# Patient Record
Sex: MALE | Race: WHITE | Employment: PART TIME | URBAN - METROPOLITAN AREA
[De-identification: names, ages, dates, MRNs, and addresses within clinical notes are randomized per-mention and may not be internally consistent; named-entity substitution may affect disease eponyms.]

---

## 2017-01-18 ENCOUNTER — APPOINTMENT (OUTPATIENT)
Dept: INTERNAL MEDICINE CLINIC | Age: 67
End: 2017-01-18

## 2017-01-18 ENCOUNTER — TELEPHONE (OUTPATIENT)
Dept: INTERNAL MEDICINE CLINIC | Age: 67
End: 2017-01-18

## 2017-01-18 DIAGNOSIS — E11.9 TYPE 2 DIABETES MELLITUS WITHOUT COMPLICATION, UNSPECIFIED LONG TERM INSULIN USE STATUS: Primary | ICD-10-CM

## 2017-01-18 DIAGNOSIS — I10 ESSENTIAL HYPERTENSION: ICD-10-CM

## 2017-01-18 DIAGNOSIS — E78.1 HYPERTRIGLYCERIDEMIA: ICD-10-CM

## 2017-01-19 LAB
ALBUMIN SERPL-MCNC: 4.5 G/DL (ref 3.6–4.8)
ALBUMIN/CREAT UR: <2.7 MG/G CREAT (ref 0–30)
ALBUMIN/GLOB SERPL: 2.1 {RATIO} (ref 1.1–2.5)
ALP SERPL-CCNC: 52 IU/L (ref 39–117)
ALT SERPL-CCNC: 30 IU/L (ref 0–44)
AST SERPL-CCNC: 27 IU/L (ref 0–40)
BILIRUB SERPL-MCNC: 0.4 MG/DL (ref 0–1.2)
BUN SERPL-MCNC: 16 MG/DL (ref 8–27)
BUN/CREAT SERPL: 17 (ref 10–22)
CALCIUM SERPL-MCNC: 9.4 MG/DL (ref 8.6–10.2)
CHLORIDE SERPL-SCNC: 103 MMOL/L (ref 96–106)
CHOLEST SERPL-MCNC: 131 MG/DL (ref 100–199)
CO2 SERPL-SCNC: 22 MMOL/L (ref 18–29)
CREAT SERPL-MCNC: 0.94 MG/DL (ref 0.76–1.27)
CREAT UR-MCNC: 113.2 MG/DL
ERYTHROCYTE [DISTWIDTH] IN BLOOD BY AUTOMATED COUNT: 13.6 % (ref 12.3–15.4)
EST. AVERAGE GLUCOSE BLD GHB EST-MCNC: 134 MG/DL
GLOBULIN SER CALC-MCNC: 2.1 G/DL (ref 1.5–4.5)
GLUCOSE SERPL-MCNC: 116 MG/DL (ref 65–99)
HBA1C MFR BLD: 6.3 % (ref 4.8–5.6)
HCT VFR BLD AUTO: 44.4 % (ref 37.5–51)
HDLC SERPL-MCNC: 41 MG/DL
HGB BLD-MCNC: 15.3 G/DL (ref 12.6–17.7)
LDLC SERPL CALC-MCNC: 67 MG/DL (ref 0–99)
MCH RBC QN AUTO: 31.8 PG (ref 26.6–33)
MCHC RBC AUTO-ENTMCNC: 34.5 G/DL (ref 31.5–35.7)
MCV RBC AUTO: 92 FL (ref 79–97)
MICROALBUMIN UR-MCNC: <3 UG/ML
PLATELET # BLD AUTO: 155 X10E3/UL (ref 150–379)
POTASSIUM SERPL-SCNC: 4.5 MMOL/L (ref 3.5–5.2)
PROT SERPL-MCNC: 6.6 G/DL (ref 6–8.5)
RBC # BLD AUTO: 4.81 X10E6/UL (ref 4.14–5.8)
SODIUM SERPL-SCNC: 140 MMOL/L (ref 134–144)
TRIGL SERPL-MCNC: 117 MG/DL (ref 0–149)
VLDLC SERPL CALC-MCNC: 23 MG/DL (ref 5–40)
WBC # BLD AUTO: 5.2 X10E3/UL (ref 3.4–10.8)

## 2017-01-31 ENCOUNTER — OFFICE VISIT (OUTPATIENT)
Dept: INTERNAL MEDICINE CLINIC | Age: 67
End: 2017-01-31

## 2017-01-31 VITALS
BODY MASS INDEX: 26.8 KG/M2 | RESPIRATION RATE: 16 BRPM | TEMPERATURE: 98 F | WEIGHT: 176.8 LBS | HEIGHT: 68 IN | OXYGEN SATURATION: 95 % | HEART RATE: 60 BPM | DIASTOLIC BLOOD PRESSURE: 54 MMHG | SYSTOLIC BLOOD PRESSURE: 108 MMHG

## 2017-01-31 DIAGNOSIS — S43.421S SPRAIN OF RIGHT ROTATOR CUFF CAPSULE, SEQUELA: ICD-10-CM

## 2017-01-31 DIAGNOSIS — N39.0 URINARY TRACT INFECTION WITHOUT HEMATURIA, SITE UNSPECIFIED: ICD-10-CM

## 2017-01-31 DIAGNOSIS — Z12.5 SCREENING FOR PROSTATE CANCER: ICD-10-CM

## 2017-01-31 DIAGNOSIS — R39.15 URGENCY OF URINATION: ICD-10-CM

## 2017-01-31 DIAGNOSIS — Z13.220 SCREENING, LIPID: ICD-10-CM

## 2017-01-31 DIAGNOSIS — Z00.00 ROUTINE GENERAL MEDICAL EXAMINATION AT A HEALTH CARE FACILITY: Primary | ICD-10-CM

## 2017-01-31 LAB
BILIRUB UR QL STRIP: NEGATIVE
GLUCOSE UR-MCNC: NEGATIVE MG/DL
KETONES P FAST UR STRIP-MCNC: NEGATIVE MG/DL
PH UR STRIP: 5 [PH] (ref 4.6–8)
PROT UR QL STRIP: NEGATIVE MG/DL
SP GR UR STRIP: 1.03 (ref 1–1.03)
UA UROBILINOGEN AMB POC: NORMAL (ref 0.2–1)
URINALYSIS CLARITY POC: CLEAR
URINALYSIS COLOR POC: YELLOW
URINE BLOOD POC: NEGATIVE
URINE LEUKOCYTES POC: NORMAL
URINE NITRITES POC: NEGATIVE

## 2017-01-31 RX ORDER — CIPROFLOXACIN 250 MG/1
250 TABLET, FILM COATED ORAL 2 TIMES DAILY
Qty: 10 TAB | Refills: 0 | Status: SHIPPED | OUTPATIENT
Start: 2017-01-31 | End: 2017-02-05

## 2017-01-31 NOTE — PROGRESS NOTES
SUBJECTIVE:   Mr. Amrik Little is a 73 y.o. W male who is here for CPE; also follow up of routine medical issues. 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 Wife \"driven insane by my snoring. \"   Back is much better. As we noted in late 2015: Dr. Miquel He saw him for back pain on 7/21. Gave him a kenalog injection and Rx for prednisone. Neither helped. He then saw Dr. Lobito Posadas, and he recommended PT. \"I've had no relief so far\" after 4 sessions. Memory: Tested by Dr. Brennan Doss in 2014, and no dementia. We noted before: He would like to have his memory checked. He has been a bit forgetful at times--noticing issues with short term recall, losing objects, forgetting the name of a coworker, etc.  His wife is concerned. Back pain is better. It is recalled that he underwent C-spine fusion in 2012, with Dr. Lobito Posadas. Still, he gets tingling and throbbing at times, particularly after long days, or activity. He continues to have R shoulder and arm pain, with tingling in hand, since rotator cuff surgery in 2011. Crepitus neck. He still has tiny bit of numbness in R hand. At this time, he is otherwise doing well and has brought no other complaints to my attention today. For a list of the medical issues addressed today, see the assessment and plan below. PMH: His ophthalmologist is Checo Jones  Past Medical History   Diagnosis Date    Adopted     Arthritis      Went to see Dr. Vinay Giles 2013, and was told he has arthritis in the hands and shoulder.  Back pain     DM (diabetes mellitus) ([facility] [address] 10/18/2012    Hypertension     Hypertriglyceridemia 8/19/2014    Liver disease      hx hepatitis C 9 yrs.  ago- treated    Shingles     Unspecified adverse effect of anesthesia      BLOOD PRESSURE WENT EXTREMILY HIGH DURING ANESTHESIA for lt rotator cuff 2009       PSH:   Past Surgical History Procedure Laterality Date    Hx cholecystectomy      Hx tonsillectomy      Hx orthopaedic  2009     Left Rotator cuff repair    Hx orthopaedic  7/2011     Right Rotator cuff repair    Hx cervical fusion  2012     C3-C5; Dr. Alda Felix Hx polypectomy  2011     Dr. Alfred Poe       All: He is allergic to codeine; metformin; oxycodone; and zostavax [zoster vaccine live (pf)]. MEDS:   Current Outpatient Prescriptions   Medication Sig    oxyCODONE IR (ROXICODONE) 5 mg immediate release tablet Take 1 Tab by mouth every four (4) hours as needed for Pain.  pramoxine-hydrocortisone (PROTOFOAM-HC) 2.5-1 % topical cream Apply  to affected area three (3) times daily.  desonide (TRIDESILON) 0.05 % cream Apply  to affected area two (2) times a day.  fenofibrate nanocrystallized (TRICOR) 145 mg tablet Take 1 Tab by mouth daily.  aspirin 81 mg tablet Take 81 mg by mouth.  PREVIDENT 5000 PLUS 1.1 % crea Use as directed    valsartan (DIOVAN) 160 mg tablet Take 1 Tab by mouth nightly.  predniSONE (DELTASONE) 20 mg tablet 60 mg po daily x 3 days, then 40 mg po daily x 3 days, then 20 mg po daily x 3 days, then stop.  albuterol (PROVENTIL HFA, VENTOLIN HFA, PROAIR HFA) 90 mcg/actuation inhaler Take 2 Puffs by inhalation every six (6) hours as needed for Wheezing.  cyclobenzaprine (FLEXERIL) 10 mg tablet Take 1 Tab by mouth three (3) times daily as needed for Muscle Spasm(s). No current facility-administered medications for this visit. FH: Adopted. SH: He is a . He reports that he quit smoking about 19 years ago. His smoking use included Cigarettes. He has a 30.00 pack-year smoking history. He has never used smokeless tobacco. He reports that he drinks alcohol. ROS: See above; Complete ROS otherwise negative.      OBJECTIVE:   Vitals:   Visit Vitals    /54 (BP 1 Location: Left arm, BP Patient Position: Sitting)    Pulse 60    Temp 98 °F (36.7 °C) (Oral)    Resp 16    Ht 5' 8\" (1.727 m)    Wt 176 lb 12.8 oz (80.2 kg)    SpO2 95%    BMI 26.88 kg/m2      Gen: Pleasant 73 y.o. W male in NAD. HEENT: PERRLA. EOMI. OP moist and pink. Neck: Supple. No LAD. HEART: RRR, No M/G/R.    LUNGS: CTAB No W/R. ABDOMEN: S, NT, ND, BS+. EXTREMITIES: Warm. No C/C/E.  MUSCULOSKELETAL: Normal ROM, muscle strength 5/5 all groups. NEURO: Alert and oriented x 3. Cranial nerves grossly intact. No focal sensory or motor deficits noted. SKIN: Warm. Dry. No rashes or other lesions noted. Lab Results   Component Value Date/Time    Hemoglobin A1c 6.3 01/18/2017 10:18 AM       Lab Results   Component Value Date/Time    Cholesterol, total 131 01/18/2017 10:18 AM    HDL Cholesterol 41 01/18/2017 10:18 AM    LDL, calculated 67 01/18/2017 10:18 AM    VLDL, calculated 23 01/18/2017 10:18 AM    Triglyceride 117 01/18/2017 10:18 AM         ASSESSMENT/ PLAN:      CPE: Normal exam.     1. Diabetes mellitus: Now controlled. Continue metformin bid. 2. Memory disturbance: Stable. No dementia per neuropsych testing. 3. Dyslipidemia: LDL is okay. 4. Spinal stenosis in cervical region: As per Dr. Familia Vazquez. 5. Rotator cuff (capsule) sprain: Stable. Refer to orthopedics. 6. History of Hepatitis C s/p treatment: CMP okay. 7. Low libido: Not discussed today. 8. Snoring: Refer to orthodontist.    I have reviewed the patient's medications and risks/side effects/benefits were discussed. Diagnosis(-es) explained to patient and questions answered. Literature provided where appropriate. Follow-up Disposition:  Return in about 6 months (around 7/31/2017) for HTN.

## 2017-01-31 NOTE — MR AVS SNAPSHOT
Visit Information Date & Time Provider Department Dept. Phone Encounter #  
 1/31/2017 10:00 AM Sergey Pringle, [address] [id number] Follow-up Instructions Return in about 6 months (around 7/31/2017) for HTN. Follow-up and Disposition History Your Appointments 2/14/2017  8:45 AM  
ROUTINE CARE with Sergey Pringle MD  
[facility] [address] [facility]) Appt Note: 6 month f/u  
 [place] [address] [address]  
[address] [facility] [address] [place] [address] Upcoming Health Maintenance Date Due DTaP/Tdap/Td series (1 - Tdap) 12/24/1972 FOOT EXAM Q1 2/18/2016 Pneumococcal 65+ Low/Medium Risk (1 of 2 - PCV13) 12/24/2016 MEDICARE YEARLY EXAM 12/24/2016 COLONOSCOPY 1/1/2017 HEMOGLOBIN A1C Q6M 7/18/2017 EYE EXAM RETINAL OR DILATED Q1 8/16/2017 MICROALBUMIN Q1 1/18/2018 LIPID PANEL Q1 1/18/2018 GLAUCOMA SCREENING Q2Y 8/16/2018 Allergies as of 1/31/2017  Review Complete On: 1/31/2017 By: Sergey Pringle MD  
  
 Severity Noted Reaction Type Reactions Codeine  07/25/2011   Systemic Other (comments) RXTREME STOMACH PAIN Metformin  10/16/2015    Other (comments) Pain Oxycodone  11/06/2012   Intolerance Rash Zostavax [Zoster Vaccine Live (Pf)]  02/18/2015    Rash Current Immunizations  Never Reviewed Name Date Influenza Vaccine 10/1/2016, 11/1/2015, 10/1/2014 Not reviewed this visit You Were Diagnosed With   
  
 Codes Comments Routine general medical examination at a health care facility    -  Primary ICD-10-CM: Z00.00 ICD-9-CM: V70.0 Sprain of right rotator cuff capsule, sequela     ICD-10-CM: L28.044D ICD-9-CM: 905.7 Urgency of urination     ICD-10-CM: R39.15 ICD-9-CM: 752.34 Screening, lipid     ICD-10-CM: M72.977 ICD-9-CM: V77.91 Screening for prostate cancer     ICD-10-CM: Z12.5 ICD-9-CM: V76.44 Urinary tract infection without hematuria, site unspecified     ICD-10-CM: N39.0 ICD-9-CM: 599.0 Vitals BP Pulse Temp Resp Height(growth percentile) Weight(growth percentile) 108/54 (BP 1 Location: Left arm, BP Patient Position: Sitting) 60 98 °F (36.7 °C) (Oral) 16 5' 8\" (1.727 m) 176 lb 12.8 oz (80.2 kg) SpO2 BMI Smoking Status 95% 26.88 kg/m2 Former Smoker Vitals History BMI and BSA Data Body Mass Index Body Surface Area  
 26.88 kg/m 2 1.96 m 2 Preferred Pharmacy Pharmacy Name Phone [address], [facility] [place] [facility] [phone] Your Updated Medication List  
  
   
This list is accurate as of: 1/31/17 11:12 AM.  Always use your most recent med list.  
  
  
  
  
 albuterol 90 mcg/actuation inhaler Commonly known as:  PROVENTIL HFA, VENTOLIN HFA, PROAIR HFA Take 2 Puffs by inhalation every six (6) hours as needed for Wheezing. aspirin 81 mg tablet Take 81 mg by mouth. ciprofloxacin HCl 250 mg tablet Commonly known as:  CIPRO Take 1 Tab by mouth two (2) times a day for 5 days. cyclobenzaprine 10 mg tablet Commonly known as:  FLEXERIL Take 1 Tab by mouth three (3) times daily as needed for Muscle Spasm(s). desonide 0.05 % cream  
Commonly known as:  Henrene Jacob Apply  to affected area two (2) times a day. fenofibrate nanocrystallized 145 mg tablet Commonly known as:  [employer] Take 1 Tab by mouth daily. oxyCODONE IR 5 mg immediate release tablet Commonly known as:  Stana Kimberly Take 1 Tab by mouth every four (4) hours as needed for Pain. pramoxine-hydrocortisone 2.5-1 % topical cream  
Commonly known as:  PROTOFOAM-HC Apply  to affected area three (3) times daily. predniSONE 20 mg tablet Commonly known as:  DELTASONE  
60 mg po daily x 3 days, then 40 mg po daily x 3 days, then 20 mg po daily x 3 days, then stop. PREVIDENT 5000 PLUS 1.1 % Crea Generic drug:  sodium fluoride Use as directed  
  
 valsartan 160 mg tablet Commonly known as:  DIOVAN Take 1 Tab by mouth nightly. Prescriptions Sent to Pharmacy Refills  
 ciprofloxacin HCl (CIPRO) 250 mg tablet 0 Sig: Take 1 Tab by mouth two (2) times a day for 5 days. Class: Normal  
 Pharmacy: [facility] [address] [place] , [address] Ph #: [phone] Route: Oral  
  
We Performed the Following AMB POC URINALYSIS DIP STICK AUTO W/O MICRO [84139 CPT(R)] CULTURE, URINE [id number] CPT(R)]  DIABETES FOOT EXAM [HM7 Custom] REFERRAL TO ORTHOPEDIC SURGERY [REF62 Custom] Comments:  
 Please evaluate patient for R shoulder pain Follow-up Instructions Return in about 6 months (around 7/31/2017) for HTN. To-Do List   
 05/25/2017 Lab:  CBC WITH AUTOMATED DIFF   
  
 05/25/2017 Lab:  HEMOGLOBIN A1C WITH EAG   
  
 05/25/2017 Lab:  LIPID PANEL   
  
 05/25/2017 Lab:  METABOLIC PANEL, COMPREHENSIVE   
  
 05/25/2017 Lab:  PSA DIAGNOSTIC (PROSTATIC SPECIFIC AG) Referral Information Referral ID Referred By Referred To  
  
 [id number] Farida Solis MD   
   [facility] [address] [place] [address] Phone: [phone] Fax: [phone] Visits Status Start Date End Date 1 New Request 1/31/17 1/31/18 If your referral has a status of pending review or denied, additional information will be sent to support the outcome of this decision. Introducing [facility] & HEALTH SERVICES! Hailey Conner introduces [employer] patient portal. Now you can access parts of your medical record, email your doctor's office, and request medication refills online. 1. In your internet browser, go to [email or web address] 2. Click on the First Time User? Click Here link in the Sign In box.  You will see the New Member Sign Up page. 3. Enter your "[employer] Access Code exactly as it appears below. You will not need to use this code after youve completed the sign-up process. If you do not sign up before the expiration date, you must request a new code. · "[employer] Access Code: [email or web address] Expires: 5/1/2017 11:12 AM 
 
4. Enter the last four digits of your Social Security Number (xxxx) and Date of Birth (mm/dd/yyyy) as indicated and click Submit. You will be taken to the next sign-up page. 5. Create a "[employer] ID. This will be your "[employer] login ID and cannot be changed, so think of one that is secure and easy to remember. 6. Create a "[employer] password. You can change your password at any time. 7. Enter your Password Reset Question and Answer. This can be used at a later time if you forget your password. 8. Enter your e-mail address. You will receive e-mail notification when new information is available in [address] 9. Click Sign Up. You can now view and download portions of your medical record. 10. Click the Download Summary menu link to download a portable copy of your medical information. If you have questions, please visit the Frequently Asked Questions section of the "[employer] website. Remember, "[employer] is NOT to be used for urgent needs. For medical emergencies, dial 911. Now available from your iPhone and Android! Please provide this summary of care documentation to your next provider. Your primary care clinician is listed as [place]. If you have any questions after today's visit, please call [phone].

## 2017-01-31 NOTE — PROGRESS NOTES
1. Have you been to the ER, urgent care clinic since your last visit? Hospitalized since your last visit?no    2. Have you seen or consulted any other health care providers outside of the [facility] since your last visit? Include any pap smears or colon screening.  no

## 2017-02-02 LAB — BACTERIA UR CULT: NO GROWTH

## 2017-03-08 ENCOUNTER — TELEPHONE (OUTPATIENT)
Dept: INTERNAL MEDICINE CLINIC | Age: 67
End: 2017-03-08

## 2017-03-08 NOTE — TELEPHONE ENCOUNTER
Pt states that he was referred to Dr. Juancarlos Akhtar for Chronic shoulder pain but their office keeps rescheduling him. Pt states he would like to be referred to someone else. Please call and advice.

## 2017-03-09 NOTE — TELEPHONE ENCOUNTER
Spoke with patient, two identifiers verified. States he has been rescheduled once at Dr. Eden Otero' office, and yesterday he went for a visit and was told \"the doctor was not there\", frustrated. Offered to give numbers to other practices to schedule an evaluation, but patient declines at this time. States he will \"deal with the chronic shoulder pain\" for now. Advised if he changes his mind, to give us a call for this information. Patient verbalizes understanding.

## 2017-04-19 ENCOUNTER — OFFICE VISIT (OUTPATIENT)
Dept: INTERNAL MEDICINE CLINIC | Age: 67
End: 2017-04-19

## 2017-04-19 VITALS
BODY MASS INDEX: 27.19 KG/M2 | HEIGHT: 68 IN | WEIGHT: 179.4 LBS | SYSTOLIC BLOOD PRESSURE: 115 MMHG | OXYGEN SATURATION: 99 % | TEMPERATURE: 97.8 F | DIASTOLIC BLOOD PRESSURE: 52 MMHG | HEART RATE: 57 BPM | RESPIRATION RATE: 16 BRPM

## 2017-04-19 DIAGNOSIS — E78.1 HYPERTRIGLYCERIDEMIA: ICD-10-CM

## 2017-04-19 DIAGNOSIS — M54.50 RIGHT-SIDED LOW BACK PAIN WITHOUT SCIATICA, UNSPECIFIED CHRONICITY: ICD-10-CM

## 2017-04-19 DIAGNOSIS — R32 URINARY INCONTINENCE, UNSPECIFIED TYPE: ICD-10-CM

## 2017-04-19 DIAGNOSIS — E11.9 TYPE 2 DIABETES MELLITUS WITHOUT COMPLICATION, UNSPECIFIED LONG TERM INSULIN USE STATUS: Primary | ICD-10-CM

## 2017-04-19 DIAGNOSIS — S43.421S SPRAIN OF RIGHT ROTATOR CUFF CAPSULE, SEQUELA: ICD-10-CM

## 2017-04-19 DIAGNOSIS — I10 ESSENTIAL HYPERTENSION: ICD-10-CM

## 2017-04-19 LAB
BILIRUB UR QL STRIP: NEGATIVE
GLUCOSE UR-MCNC: NORMAL MG/DL
KETONES P FAST UR STRIP-MCNC: NEGATIVE MG/DL
PH UR STRIP: 6.5 [PH] (ref 4.6–8)
PROT UR QL STRIP: NEGATIVE MG/DL
SP GR UR STRIP: 1.03 (ref 1–1.03)
UA UROBILINOGEN AMB POC: NORMAL (ref 0.2–1)
URINALYSIS CLARITY POC: CLEAR
URINALYSIS COLOR POC: YELLOW
URINE BLOOD POC: NEGATIVE
URINE LEUKOCYTES POC: NEGATIVE
URINE NITRITES POC: NEGATIVE

## 2017-04-19 RX ORDER — CYCLOBENZAPRINE HCL 10 MG
10 TABLET ORAL
Qty: 30 TAB | Refills: 1 | Status: SHIPPED | OUTPATIENT
Start: 2017-04-19 | End: 2017-06-06 | Stop reason: SDUPTHER

## 2017-04-19 RX ORDER — OXYCODONE HYDROCHLORIDE 5 MG/1
5 TABLET ORAL
Qty: 30 TAB | Refills: 0 | Status: SHIPPED | OUTPATIENT
Start: 2017-04-19 | End: 2017-06-21 | Stop reason: SDUPTHER

## 2017-04-19 NOTE — PROGRESS NOTES
1. Have you been to the ER, urgent care clinic since your last visit? Hospitalized since your last visit?no  2. Have you seen or consulted any other health care providers outside of the [address] [place] [address] since your last visit? Include any pap smears or colon screening.  no

## 2017-04-19 NOTE — MR AVS SNAPSHOT
Visit Information Date & Time Provider Department Dept. Phone Encounter #  
 4/19/2017 11:15 AM William Humphrey, [address] [id number] Follow-up Instructions Return in about 4 months (around 8/19/2017) for DM, HTN, etc. Upcoming Health Maintenance Date Due DTaP/Tdap/Td series (1 - Tdap) 12/24/1972 Pneumococcal 65+ Low/Medium Risk (1 of 2 - PCV13) 12/24/2016 MEDICARE YEARLY EXAM 12/24/2016 HEMOGLOBIN A1C Q6M 7/18/2017 EYE EXAM RETINAL OR DILATED Q1 8/16/2017 MICROALBUMIN Q1 1/18/2018 LIPID PANEL Q1 1/18/2018 FOOT EXAM Q1 1/31/2018 GLAUCOMA SCREENING Q2Y 8/16/2018 COLONOSCOPY 3/14/2022 Allergies as of 4/19/2017  Review Complete On: 4/19/2017 By: William Humphrey MD  
  
 Severity Noted Reaction Type Reactions Codeine  07/25/2011   Systemic Other (comments) RXTREME STOMACH PAIN Metformin  10/16/2015    Other (comments) Pain Oxycodone  11/06/2012   Intolerance Rash Zostavax [Zoster Vaccine Live (Pf)]  02/18/2015    Rash Current Immunizations  Never Reviewed Name Date Influenza Vaccine 10/1/2016, 11/1/2015, 10/1/2014 Not reviewed this visit You Were Diagnosed With   
  
 Codes Comments Type 2 diabetes mellitus without complication, unspecified long term insulin use status    -  Primary ICD-10-CM: E11.9 ICD-9-CM: 250.00 Essential hypertension     ICD-10-CM: I10 
ICD-9-CM: 401.9 Hypertriglyceridemia     ICD-10-CM: E78.1 ICD-9-CM: 272.1 Sprain of right rotator cuff capsule, sequela     ICD-10-CM: H57.067D ICD-9-CM: 905.7 Urinary incontinence, unspecified type     ICD-10-CM: R32 
ICD-9-CM: 788.30 Right-sided low back pain without sciatica, unspecified chronicity     ICD-10-CM: M54.5 ICD-9-CM: 724.2 Vitals BP Pulse Temp Resp Height(growth percentile) Weight(growth percentile)  115/52 (BP 1 Location: Left arm, BP Patient Position: Sitting) (!) 57 97.8 °F (36.6 °C) (Oral) 16 5' 8\" (1.727 m) 179 lb 6.4 oz (81.4 kg) SpO2 BMI Smoking Status 99% 27.28 kg/m2 Former Smoker Vitals History BMI and BSA Data Body Mass Index Body Surface Area  
 27.28 kg/m 2 1.98 m 2 Preferred Pharmacy Pharmacy Name Phone [address] [place], [facility] [phone] Your Updated Medication List  
  
   
This list is accurate as of: 4/19/17 12:13 PM.  Always use your most recent med list.  
  
  
  
  
 albuterol 90 mcg/actuation inhaler Commonly known as:  PROVENTIL HFA, VENTOLIN HFA, PROAIR HFA Take 2 Puffs by inhalation every six (6) hours as needed for Wheezing. aspirin 81 mg tablet Take 81 mg by mouth. cyclobenzaprine 10 mg tablet Commonly known as:  FLEXERIL Take 1 Tab by mouth three (3) times daily as needed for Muscle Spasm(s). desonide 0.05 % cream  
Commonly known as:  Nichol Pillar Apply  to affected area two (2) times a day. fenofibrate nanocrystallized 145 mg tablet Commonly known as:  [employer] Take 1 Tab by mouth daily. oxyCODONE IR 5 mg immediate release tablet Commonly known as:  Thressa Jonelle Take 1 Tab by mouth every four (4) hours as needed for Pain. pramoxine-hydrocortisone 2.5-1 % topical cream  
Commonly known as:  PROTOFOAM-HC Apply  to affected area three (3) times daily. predniSONE 20 mg tablet Commonly known as:  DELTASONE  
60 mg po daily x 3 days, then 40 mg po daily x 3 days, then 20 mg po daily x 3 days, then stop. PREVIDENT 5000 PLUS 1.1 % Crea Generic drug:  sodium fluoride Use as directed  
  
 valsartan 160 mg tablet Commonly known as:  DIOVAN Take 1 Tab by mouth nightly. Prescriptions Printed Refills  
 oxyCODONE IR (ROXICODONE) 5 mg immediate release tablet 0 Sig: Take 1 Tab by mouth every four (4) hours as needed for Pain. Class: Print  Route: Oral  
  
 Prescriptions Sent to Pharmacy Refills  
 cyclobenzaprine (FLEXERIL) 10 mg tablet 1 Sig: Take 1 Tab by mouth three (3) times daily as needed for Muscle Spasm(s). Class: Normal  
 Pharmacy: [facility] [address], [address] [place] [address] #: [phone] Route: Oral  
  
We Performed the Following AMB POC URINALYSIS DIP STICK AUTO W/O MICRO [48107 CPT(R)] REFERRAL TO UROLOGY [MAZ006 Custom] Follow-up Instructions Return in about 4 months (around 8/19/2017) for DM, HTN, etc.  
  
  
Referral Information Referral ID Referred By Referred To  
  
 [id number] [facility] [address] [facility] [address] [place], [address] [facility] Visits Status Start Date End Date 1 New Request 4/19/17 4/19/18 If your referral has a status of pending review or denied, additional information will be sent to support the outcome of this decision. Introducing [facility] & HEALTH SERVICES! Sina Martinez introduces [employer] patient portal. Now you can access parts of your medical record, email your doctor's office, and request medication refills online. 1. In your internet browser, go to [email or web address] 2. Click on the First Time User? Click Here link in the Sign In box. You will see the New Member Sign Up page. 3. Enter your [employer] Access Code exactly as it appears below. You will not need to use this code after youve completed the sign-up process. If you do not sign up before the expiration date, you must request a new code. · [employer] Access Code: [email or web address] Expires: 5/1/2017 12:12 PM 
 
4. Enter the last four digits of your Social Security Number (xxxx) and Date of Birth (mm/dd/yyyy) as indicated and click Submit. You will be taken to the next sign-up page. 5. Create a [employer] ID. This will be your [employer] login ID and cannot be changed, so think of one that is secure and easy to remember. 6. Create a [employer] password. You can change your password at any time. 7. Enter your Password Reset Question and Answer. This can be used at a later time if you forget your password. 8. Enter your e-mail address. You will receive e-mail notification when new information is available in [address] 9. Click Sign Up. You can now view and download portions of your medical record. 10. Click the Download Summary menu link to download a portable copy of your medical information. If you have questions, please visit the Frequently Asked Questions section of the [employer] website. Remember, [employer] is NOT to be used for urgent needs. For medical emergencies, dial 911. Now available from your iPhone and Android! Please provide this summary of care documentation to your next provider. Your primary care clinician is listed as [place]. If you have any questions after today's visit, please call [phone].

## 2017-04-19 NOTE — PROGRESS NOTES
SUBJECTIVE:   Mr. Lorena Mesa is a 73 y.o. W male who is here for acute complaint; also follow up of routine medical issues. Chief Complaint   Patient presents with    Bladder Infection     pt concerned of incontence        Comes in for urinary frequency and urgency, which has been noted over the past few weeks. He has had some incontinence as well. Stress: stable. 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 He hasn't yet addressed the snoring. He made appt with orthopedist, and \"they called and canceled. \" He continues to have R shoulder and arm pain, with tingling in hand, since rotator cuff surgery in 2011. Crepitus neck. He still has tiny bit of numbness in R hand. Back is much better. As we noted in late 2015: Dr. Keyshawn Torres saw him for back pain on 7/21. Gave him a kenalog injection and Rx for prednisone. Neither helped. He then saw Dr. Meryle Guiles, and he recommended PT. \"I've had no relief so far\" after 4 sessions. Memory: Tested by Dr. Storm Burt in 2014, and no dementia. We noted before: He would like to have his memory checked. He has been a bit forgetful at times--noticing issues with short term recall, losing objects, forgetting the name of a coworker, etc.  His wife is concerned. Back pain is better. It is recalled that he underwent C-spine fusion in 2012, with Dr. Meryle Guiles. Still, he gets tingling and throbbing at times, particularly after long days, or activity. At this time, he is otherwise doing well and has brought no other complaints to my attention today. For a list of the medical issues addressed today, see the assessment and plan below. PMH: His ophthalmologist is Jazmin Padilla  Past Medical History:   Diagnosis Date    Adopted     Arthritis     Went to see Dr. Sisi Negron 2013, and was told he has arthritis in the hands and shoulder.       Back pain     DM [facility]di[facility] mellitus) ([facility] [address] 10/18/2012    Hypertension     Hypertriglyceridemia 8/19/2014    Liver disease hx hepatitis C 9 yrs. ago- treated    Shingles     Unspecified adverse effect of anesthesia     BLOOD PRESSURE WENT EXTREMILY HIGH DURING ANESTHESIA for lt rotator cuff 2009       PSH:   Past Surgical History:   Procedure Laterality Date    HX CERVICAL FUSION  2012    C3-C5; Dr. Neelima Castrejon HX ORTHOPAEDIC  2009    Left Rotator cuff repair    HX ORTHOPAEDIC  7/2011    Right Rotator cuff repair    HX POLYPECTOMY  2011    Dr. Shan Burroughs: He is allergic to codeine; metformin; oxycodone; and zostavax [zoster vaccine live (pf)]. MEDS:   Current Outpatient Prescriptions   Medication Sig    oxyCODONE IR (ROXICODONE) 5 mg immediate release tablet Take 1 Tab by mouth every four (4) hours as needed for Pain.  pramoxine-hydrocortisone (PROTOFOAM-HC) 2.5-1 % topical cream Apply  to affected area three (3) times daily.  PREVIDENT 5000 PLUS 1.1 % crea Use as directed    desonide (TRIDESILON) 0.05 % cream Apply  to affected area two (2) times a day.  valsartan (DIOVAN) 160 mg tablet Take 1 Tab by mouth nightly.  fenofibrate nanocrystallized (TRICOR) 145 mg tablet Take 1 Tab by mouth daily.  aspirin 81 mg tablet Take 81 mg by mouth.  predniSONE (DELTASONE) 20 mg tablet 60 mg po daily x 3 days, then 40 mg po daily x 3 days, then 20 mg po daily x 3 days, then stop.  albuterol (PROVENTIL HFA, VENTOLIN HFA, PROAIR HFA) 90 mcg/actuation inhaler Take 2 Puffs by inhalation every six (6) hours as needed for Wheezing.  cyclobenzaprine (FLEXERIL) 10 mg tablet Take 1 Tab by mouth three (3) times daily as needed for Muscle Spasm(s). No current facility-administered medications for this visit. FH: Adopted. SH: He is a . He reports that he quit smoking about 19 years ago. His smoking use included Cigarettes. He has a 30.00 pack-year smoking history.  He has never used smokeless tobacco. He reports that he drinks alcohol. ROS: See above; Complete ROS otherwise negative. OBJECTIVE:   Vitals:   Visit Vitals    /52 (BP 1 Location: Left arm, BP Patient Position: Sitting)    Pulse (!) 57    Temp 97.8 °F (36.6 °C) (Oral)    Resp 16    Ht 5' 8\" (1.727 m)    Wt 179 lb 6.4 oz (81.4 kg)    SpO2 99%    BMI 27.28 kg/m2      Gen: Pleasant 73 y.o. W male in NAD. HEENT: PERRLA. EOMI. OP moist and pink. Neck: Supple. No LAD. HEART: RRR, No M/G/R.    LUNGS: CTAB No W/R. ABDOMEN: S, NT, ND, BS+. EXTREMITIES: Warm. No C/C/E.  MUSCULOSKELETAL: Normal ROM, muscle strength 5/5 all groups. NEURO: Alert and oriented x 3. Cranial nerves grossly intact. No focal sensory or motor deficits noted. SKIN: Warm. Dry. No rashes or other lesions noted. Lab Results   Component Value Date/Time    Hemoglobin A1c 6.3 01/18/2017 10:18 AM       Lab Results   Component Value Date/Time    Cholesterol, total 131 01/18/2017 10:18 AM    HDL Cholesterol 41 01/18/2017 10:18 AM    LDL, calculated 67 01/18/2017 10:18 AM    VLDL, calculated 23 01/18/2017 10:18 AM    Triglyceride 117 01/18/2017 10:18 AM       UA today: 100 mg / dl Glucose. ASSESSMENT/ PLAN:      1. Urinary frequency and incontinence: UA as above. Refer to Urology. 2. Diabetes mellitus: Controlled at last check, however, he has glycosuria--labs pending. Continue metformin bid. 3. Memory disturbance: Stable. No dementia per neuropsych testing. 4. Dyslipidemia: LDL is okay. 5. Spinal stenosis in cervical region: As per Dr. Blayne Patel. 6. Rotator cuff (capsule) sprain: Stable. Referred to orthopedics--for now, he wishes to hold off any surgery. 7. History of Hepatitis C s/p treatment: CMP okay. 8. Low libido: Not discussed today. 9. Snoring: Referred prev to orthodontist.    I have reviewed the patient's medications and risks/side effects/benefits were discussed. Diagnosis(-es) explained to patient and questions answered.  Literature provided where appropriate.      Follow-up Disposition:  Return in about 4 months (around 8/19/2017) for DM, HTN, etc.

## 2017-05-26 ENCOUNTER — TELEPHONE (OUTPATIENT)
Dept: INTERNAL MEDICINE CLINIC | Age: 67
End: 2017-05-26

## 2017-05-26 RX ORDER — AMOXICILLIN AND CLAVULANATE POTASSIUM 875; 125 MG/1; MG/1
1 TABLET, FILM COATED ORAL EVERY 12 HOURS
Qty: 10 TAB | Refills: 0 | Status: SHIPPED | OUTPATIENT
Start: 2017-05-26 | End: 2017-05-31

## 2017-05-26 NOTE — TELEPHONE ENCOUNTER
Pt states for a week he has had a lot of green mucus and when he coughs he does bring this up.   Pt is requesting a medication to be sent to [facility] on file

## 2017-06-05 ENCOUNTER — TELEPHONE (OUTPATIENT)
Dept: INTERNAL MEDICINE CLINIC | Age: 67
End: 2017-06-05

## 2017-06-05 DIAGNOSIS — M54.89 OTHER BACK PAIN, UNSPECIFIED CHRONICITY: Primary | ICD-10-CM

## 2017-06-05 DIAGNOSIS — M54.50 RIGHT-SIDED LOW BACK PAIN WITHOUT SCIATICA, UNSPECIFIED CHRONICITY: ICD-10-CM

## 2017-06-05 NOTE — TELEPHONE ENCOUNTER
Pt states he needs to see a PT for his back and needs a referral.  Pt states he would like to have this done where he did previously, but couldn't remember who it was with in our building.      Please call pt

## 2017-06-06 RX ORDER — CYCLOBENZAPRINE HCL 10 MG
10 TABLET ORAL
Qty: 30 TAB | Refills: 1 | Status: SHIPPED | OUTPATIENT
Start: 2017-06-06 | End: 2019-06-06

## 2017-06-06 NOTE — TELEPHONE ENCOUNTER
Patient made aware script will be mailed. Patient will schedule the appointment. Identified patient 2 identifiers verified.

## 2017-06-06 NOTE — TELEPHONE ENCOUNTER
Pended PT referral. Patient also requesting an order for a muscle relaxer that was prescribed last office visit 4/19/17.

## 2017-06-06 NOTE — TELEPHONE ENCOUNTER
Patient requests a call back with status of request for Physical Therapy & call from yesterday. Patient also states he would like to discuss possible getting a muscle relaxer called in as he did not use prescription last time Dr. Cheli Marx called in this type of medication. Please call to discuss.  Thank you

## 2017-06-16 RX ORDER — FENOFIBRATE 145 MG/1
145 TABLET, COATED ORAL DAILY
Qty: 90 TAB | Refills: 3 | Status: SHIPPED | OUTPATIENT
Start: 2017-06-16 | End: 2018-07-31 | Stop reason: SDUPTHER

## 2017-06-16 RX ORDER — VALSARTAN 160 MG/1
160 TABLET ORAL
Qty: 90 TAB | Refills: 3 | Status: SHIPPED | OUTPATIENT
Start: 2017-06-16 | End: 2018-04-25 | Stop reason: SDUPTHER

## 2017-06-21 RX ORDER — OXYCODONE HYDROCHLORIDE 5 MG/1
5 TABLET ORAL
Qty: 30 TAB | Refills: 0 | Status: SHIPPED | OUTPATIENT
Start: 2017-06-21 | End: 2017-08-16 | Stop reason: SDUPTHER

## 2017-08-16 ENCOUNTER — OFFICE VISIT (OUTPATIENT)
Dept: INTERNAL MEDICINE CLINIC | Age: 67
End: 2017-08-16

## 2017-08-16 VITALS
RESPIRATION RATE: 18 BRPM | BODY MASS INDEX: 26.4 KG/M2 | SYSTOLIC BLOOD PRESSURE: 109 MMHG | HEIGHT: 68 IN | WEIGHT: 174.2 LBS | DIASTOLIC BLOOD PRESSURE: 58 MMHG | TEMPERATURE: 98.7 F | HEART RATE: 62 BPM | OXYGEN SATURATION: 95 %

## 2017-08-16 DIAGNOSIS — I10 ESSENTIAL HYPERTENSION: ICD-10-CM

## 2017-08-16 DIAGNOSIS — E78.1 HYPERTRIGLYCERIDEMIA: ICD-10-CM

## 2017-08-16 DIAGNOSIS — E11.9 TYPE 2 DIABETES MELLITUS WITHOUT COMPLICATION, UNSPECIFIED LONG TERM INSULIN USE STATUS: Primary | ICD-10-CM

## 2017-08-16 RX ORDER — OXYCODONE HYDROCHLORIDE 5 MG/1
5 TABLET ORAL
Qty: 30 TAB | Refills: 0 | Status: SHIPPED | OUTPATIENT
Start: 2017-08-16 | End: 2017-12-07 | Stop reason: SDUPTHER

## 2017-08-16 RX ORDER — SOLIFENACIN SUCCINATE 5 MG/1
5 TABLET, FILM COATED ORAL DAILY
COMMUNITY
End: 2019-06-06

## 2017-08-16 NOTE — PROGRESS NOTES
SUBJECTIVE:   Mr. Caron Ball is a 73 y.o. W male who is here for acute complaint; also follow up of routine medical issues. Chief Complaint   Patient presents with    Annual Wellness Visit     pt due for medicare wellness exam    Diabetes     pt here today for routine f.u    Back Pain     pt c.o pain by L shoulder blade; pt concerned of pulled muscle    Foot Pain     pt c.o pain in R foot; pt has a bulge on side of big toe     Arm Pain     pt states that pain started in R wrist; pain radiates into R forearm/elbow       Pain in lateral L forearm comes and goes. Started in wrist.    Urinary frequency and urgency + incontinence -- Has seen urologist, and he is on vesicare. \"That has helped a lot. \"  Taking half a tablet due to side effects (blurry vision). Getting therapy. Pain in back had flared up. Not currently seeing orthopedist.  Chronic back pain:  It is recalled that he underwent C-spine fusion in 2012, with Dr. Camden Hardin. Still, he gets tingling and throbbing at times, particularly after long days, or activity. Stress: \"The last 6 weeks horrible. 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 The R shoulder and arm pain, with tingling in hand, since rotator cuff surgery in 2011--doing much better--\"not hurting at all. \"  Crepitus neck. He still has tiny bit of numbness in R hand. Memory: Tested by Dr. Fawad Tan in 2014, and no dementia. We noted before: He would like to have his memory checked. He has been a bit forgetful at times--noticing issues with short term recall, losing objects, forgetting the name of a coworker, etc.  His wife is concerned. At this time, he is otherwise doing well and has brought no other complaints to my attention today. For a list of the medical issues addressed today, see the assessment and plan below.     PMH: His ophthalmologist is Barak Gonzalez  Past Medical History:   Diagnosis Date    Adopted     Arthritis     Reza Mcmahon to see Dr. Harmeet Adams 2013, and was told he has arthritis in the hands and shoulder.  Back pain     DM (diabetes mellitus) ([facility] [address] 10/18/2012    Hypertension     Hypertriglyceridemia 8/19/2014    Liver disease     hx hepatitis C 9 yrs. ago- treated    Shingles     Unspecified adverse effect of anesthesia     BLOOD PRESSURE WENT EXTREMILY HIGH DURING ANESTHESIA for lt rotator cuff 2009       PSH:   Past Surgical History:   Procedure Laterality Date    HX CERVICAL FUSION  2012    C3-C5; Dr. Tamia Guerrero HX ORTHOPAEDIC  2009    Left Rotator cuff repair    HX ORTHOPAEDIC  7/2011    Right Rotator cuff repair    HX POLYPECTOMY  2011    Dr. Michelle Prajapati: He is allergic to codeine; metformin; oxycodone; and zostavax [zoster vaccine live (pf)]. MEDS:   Current Outpatient Prescriptions   Medication Sig    solifenacin (VESICARE) 5 mg tablet Take 5 mg by mouth daily.  oxyCODONE IR (ROXICODONE) 5 mg immediate release tablet Take 1 Tab by mouth every four (4) hours as needed for Pain.  valsartan (DIOVAN) 160 mg tablet Take 1 Tab by mouth nightly.  fenofibrate nanocrystallized (TRICOR) 145 mg tablet Take 1 Tab by mouth daily.  cyclobenzaprine (FLEXERIL) 10 mg tablet Take 1 Tab by mouth three (3) times daily as needed for Muscle Spasm(s).  pramoxine-hydrocortisone (PROTOFOAM-HC) 2.5-1 % topical cream Apply  to affected area three (3) times daily.  PREVIDENT 5000 PLUS 1.1 % crea Use as directed    desonide (TRIDESILON) 0.05 % cream Apply  to affected area two (2) times a day.  aspirin 81 mg tablet Take 81 mg by mouth.  predniSONE (DELTASONE) 20 mg tablet 60 mg po daily x 3 days, then 40 mg po daily x 3 days, then 20 mg po daily x 3 days, then stop.  albuterol (PROVENTIL HFA, VENTOLIN HFA, PROAIR HFA) 90 mcg/actuation inhaler Take 2 Puffs by inhalation every six (6) hours as needed for Wheezing.      No current facility-administered medications for this visit. FH: Adopted. SH: He is a . He reports that he quit smoking about 20 years ago. His smoking use included Cigarettes. He has a 30.00 pack-year smoking history. He has never used smokeless tobacco. He reports that he drinks alcohol. ROS: See above; Complete ROS otherwise negative. OBJECTIVE:   Vitals:   Visit Vitals    /58 (BP 1 Location: Left arm, BP Patient Position: Sitting)    Pulse 62    Temp 98.7 °F (37.1 °C) (Oral)    Resp 18    Ht 5' 8\" (1.727 m)    Wt 174 lb 3.2 oz (79 kg)    SpO2 95%    BMI 26.49 kg/m2      Gen: Pleasant 73 y.o. W male in NAD. HEENT: PERRLA. EOMI. OP moist and pink. Neck: Supple. No LAD. HEART: RRR, No M/G/R.    LUNGS: CTAB No W/R. ABDOMEN: S, NT, ND, BS+. EXTREMITIES: Warm. No C/C/E.  MUSCULOSKELETAL: Normal ROM, muscle strength 5/5 all groups. NEURO: Alert and oriented x 3. Cranial nerves grossly intact. No focal sensory or motor deficits noted. SKIN: Warm. Dry. No rashes or other lesions noted. Lab Results   Component Value Date/Time    Hemoglobin A1c 6.3 01/18/2017 10:18 AM       Lab Results   Component Value Date/Time    Cholesterol, total 131 01/18/2017 10:18 AM    HDL Cholesterol 41 01/18/2017 10:18 AM    LDL, calculated 67 01/18/2017 10:18 AM    VLDL, calculated 23 01/18/2017 10:18 AM    Triglyceride 117 01/18/2017 10:18 AM       ASSESSMENT/ PLAN:      1. Diabetes mellitus: Controlled at last check, however, he has glycosuria--labs pending. Continue metformin bid. 2. Memory disturbance: Stable. No dementia per neuropsych testing. 3. Dyslipidemia: LDL is okay. 4. Rotator cuff (capsule) sprain: Stable. Referred to orthopedics--for now, he wishes to hold off any surgery. 5. History of Hepatitis C s/p treatment: CMP okay. 6. Low libido: Not discussed today. 7. Snoring: Referred prev to orthodontist.  8. Urinary frequency and incontinence: Now on vesicare.  Seeing urologist.     I have reviewed the patient's medications and risks/side effects/benefits were discussed. Diagnosis(-es) explained to patient and questions answered. Literature provided where appropriate. Follow-up Disposition:  Return in about 4 months (around 12/16/2017) for DM, HTN.

## 2017-08-16 NOTE — PROGRESS NOTES
1. Have you been to the ER, urgent care clinic since your last visit? Hospitalized since your last visit?no    2. Have you seen or consulted any other health care providers outside of the [address] [place] [address] since your last visit? Include any pap smears or colon screening.  no

## 2017-08-22 ENCOUNTER — LAB ONLY (OUTPATIENT)
Dept: INTERNAL MEDICINE CLINIC | Age: 67
End: 2017-08-22

## 2017-08-22 DIAGNOSIS — Z13.220 SCREENING, LIPID: ICD-10-CM

## 2017-08-22 DIAGNOSIS — Z12.5 SCREENING FOR PROSTATE CANCER: ICD-10-CM

## 2017-08-22 DIAGNOSIS — Z00.00 ROUTINE GENERAL MEDICAL EXAMINATION AT A HEALTH CARE FACILITY: ICD-10-CM

## 2017-08-23 LAB
ALBUMIN SERPL-MCNC: 4.7 G/DL (ref 3.6–4.8)
ALBUMIN/GLOB SERPL: 2 {RATIO} (ref 1.2–2.2)
ALP SERPL-CCNC: 52 IU/L (ref 39–117)
ALT SERPL-CCNC: 26 IU/L (ref 0–44)
AST SERPL-CCNC: 27 IU/L (ref 0–40)
BASOPHILS # BLD AUTO: 0 X10E3/UL (ref 0–0.2)
BASOPHILS NFR BLD AUTO: 1 %
BILIRUB SERPL-MCNC: 0.4 MG/DL (ref 0–1.2)
BUN SERPL-MCNC: 18 MG/DL (ref 8–27)
BUN/CREAT SERPL: 19 (ref 10–24)
CALCIUM SERPL-MCNC: 9.1 MG/DL (ref 8.6–10.2)
CHLORIDE SERPL-SCNC: 103 MMOL/L (ref 96–106)
CHOLEST SERPL-MCNC: 143 MG/DL (ref 100–199)
CO2 SERPL-SCNC: 20 MMOL/L (ref 18–29)
CREAT SERPL-MCNC: 0.95 MG/DL (ref 0.76–1.27)
EOSINOPHIL # BLD AUTO: 0.2 X10E3/UL (ref 0–0.4)
EOSINOPHIL NFR BLD AUTO: 3 %
ERYTHROCYTE [DISTWIDTH] IN BLOOD BY AUTOMATED COUNT: 14 % (ref 12.3–15.4)
EST. AVERAGE GLUCOSE BLD GHB EST-MCNC: 126 MG/DL
GLOBULIN SER CALC-MCNC: 2.4 G/DL (ref 1.5–4.5)
GLUCOSE SERPL-MCNC: 121 MG/DL (ref 65–99)
HBA1C MFR BLD: 6 % (ref 4.8–5.6)
HCT VFR BLD AUTO: 46.2 % (ref 37.5–51)
HDLC SERPL-MCNC: 44 MG/DL
HGB BLD-MCNC: 15.6 G/DL (ref 12.6–17.7)
IMM GRANULOCYTES # BLD: 0 X10E3/UL (ref 0–0.1)
IMM GRANULOCYTES NFR BLD: 0 %
LDLC SERPL CALC-MCNC: 61 MG/DL (ref 0–99)
LYMPHOCYTES # BLD AUTO: 2.2 X10E3/UL (ref 0.7–3.1)
LYMPHOCYTES NFR BLD AUTO: 34 %
MCH RBC QN AUTO: 32 PG (ref 26.6–33)
MCHC RBC AUTO-ENTMCNC: 33.8 G/DL (ref 31.5–35.7)
MCV RBC AUTO: 95 FL (ref 79–97)
MONOCYTES # BLD AUTO: 0.5 X10E3/UL (ref 0.1–0.9)
MONOCYTES NFR BLD AUTO: 9 %
NEUTROPHILS # BLD AUTO: 3.4 X10E3/UL (ref 1.4–7)
NEUTROPHILS NFR BLD AUTO: 53 %
PLATELET # BLD AUTO: 144 X10E3/UL (ref 150–379)
POTASSIUM SERPL-SCNC: 4.5 MMOL/L (ref 3.5–5.2)
PROT SERPL-MCNC: 7.1 G/DL (ref 6–8.5)
PSA SERPL-MCNC: 1.5 NG/ML (ref 0–4)
RBC # BLD AUTO: 4.87 X10E6/UL (ref 4.14–5.8)
SODIUM SERPL-SCNC: 142 MMOL/L (ref 134–144)
TRIGL SERPL-MCNC: 188 MG/DL (ref 0–149)
VLDLC SERPL CALC-MCNC: 38 MG/DL (ref 5–40)
WBC # BLD AUTO: 6.3 X10E3/UL (ref 3.4–10.8)

## 2017-09-06 ENCOUNTER — TELEPHONE (OUTPATIENT)
Dept: INTERNAL MEDICINE CLINIC | Age: 67
End: 2017-09-06

## 2017-09-12 ENCOUNTER — TELEPHONE (OUTPATIENT)
Dept: INTERNAL MEDICINE CLINIC | Age: 67
End: 2017-09-12

## 2017-09-12 NOTE — TELEPHONE ENCOUNTER
----- Message from Hector De Jesus sent at 9/11/2017  4:40 PM EDT -----  Regarding: /Telephone  Pt is returning ricardo's call. Best contact number is [phone].       Copied/pasted from [email or web address]

## 2017-09-14 NOTE — TELEPHONE ENCOUNTER
Patient called and left a voicemail about his referral to Physical Therapy. Patient states that he went to [facility] and has completed PT. Notes received from [facility].

## 2017-10-03 ENCOUNTER — TELEPHONE (OUTPATIENT)
Dept: INTERNAL MEDICINE CLINIC | Age: 67
End: 2017-10-03

## 2017-10-03 RX ORDER — AMOXICILLIN AND CLAVULANATE POTASSIUM 875; 125 MG/1; MG/1
1 TABLET, FILM COATED ORAL EVERY 12 HOURS
Qty: 10 TAB | Refills: 0 | Status: SHIPPED | OUTPATIENT
Start: 2017-10-03 | End: 2017-10-08

## 2017-10-03 NOTE — TELEPHONE ENCOUNTER
Call to pt, ID verified x2. Pt c/o congestion x2 weeks. Dark yellow drainage. Non-productive cough. Tried an expectorant x2 days with no relief. Pt denies chest tightness/pain, SOB. Sent to provider for review but informed pt he may need to go to Urgent care for further evaluation as he cannot come into office due to scheduling conflict.

## 2017-10-03 NOTE — TELEPHONE ENCOUNTER
Pt is requesting an Rx for coughing and congestion called to [facility]. Pts number is [phone] or home [phone].       Message received & copied from [facility]

## 2017-11-12 ENCOUNTER — APPOINTMENT (OUTPATIENT)
Dept: GENERAL RADIOLOGY | Age: 67
End: 2017-11-12
Attending: FAMILY MEDICINE

## 2017-11-12 ENCOUNTER — HOSPITAL ENCOUNTER (EMERGENCY)
Age: 67
Discharge: HOME | End: 2017-11-12
Attending: FAMILY MEDICINE

## 2017-11-12 VITALS
BODY MASS INDEX: 26.07 KG/M2 | SYSTOLIC BLOOD PRESSURE: 129 MMHG | HEIGHT: 69 IN | RESPIRATION RATE: 18 BRPM | DIASTOLIC BLOOD PRESSURE: 93 MMHG | HEART RATE: 72 BPM | WEIGHT: 176 LBS | OXYGEN SATURATION: 98 % | TEMPERATURE: 98.4 F

## 2017-11-12 DIAGNOSIS — J20.9 ACUTE BRONCHITIS, UNSPECIFIED ORGANISM: Primary | ICD-10-CM

## 2017-11-12 RX ORDER — AZITHROMYCIN 250 MG/1
TABLET, FILM COATED ORAL
Qty: 6 TAB | Refills: 0 | Status: SHIPPED | OUTPATIENT
Start: 2017-11-12 | End: 2017-11-24

## 2017-11-12 RX ORDER — BENZONATATE 200 MG/1
200 CAPSULE ORAL
Qty: 30 CAP | Refills: 0 | Status: SHIPPED | OUTPATIENT
Start: 2017-11-12 | End: 2017-11-24

## 2017-11-12 NOTE — DISCHARGE INSTRUCTIONS
Bronchitis: Care Instructions  Your Care Instructions    Bronchitis is inflammation of the bronchial tubes, which carry air to the lungs. The tubes swell and produce mucus, or phlegm. The mucus and inflamed bronchial tubes make you cough. You may have trouble breathing. Most cases of bronchitis are caused by viruses like those that cause colds. Antibiotics usually do not help and they may be harmful. Bronchitis usually develops rapidly and lasts about 2 to 3 weeks in otherwise healthy people. Follow-up care is a key part of your treatment and safety. Be sure to make and go to all appointments, and call your doctor if you are having problems. It's also a good idea to know your test results and keep a list of the medicines you take. How can you care for yourself at home? · Take all medicines exactly as prescribed. Call your doctor if you think you are having a problem with your medicine. · Get some extra rest.  · Take an over-the-counter pain medicine, such as acetaminophen (Tylenol), ibuprofen (Advil, Motrin), or naproxen (Aleve) to reduce fever and relieve body aches. Read and follow all instructions on the label. · Do not take two or more pain medicines at the same time unless the doctor told you to. Many pain medicines have acetaminophen, which is Tylenol. Too much acetaminophen (Tylenol) can be harmful. · Take an over-the-counter cough medicine that contains dextromethorphan to help quiet a dry, hacking cough so that you can sleep. Avoid cough medicines that have more than one active ingredient. Read and follow all instructions on the label. · Breathe moist air from a humidifier, hot shower, or sink filled with hot water. The heat and moisture will thin mucus so you can cough it out. · Do not smoke. Smoking can make bronchitis worse. If you need help quitting, talk to your doctor about stop-smoking programs and medicines. These can increase your chances of quitting for good.   When should you call for help? Call 911 anytime you think you may need emergency care. For example, call if:  ? · You have severe trouble breathing. ?Call your doctor now or seek immediate medical care if:  ? · You have new or worse trouble breathing. ? · You cough up dark brown or bloody mucus (sputum). ? · You have a new or higher fever. ? · You have a new rash. ? Watch closely for changes in your health, and be sure to contact your doctor if:  ? · You cough more deeply or more often, especially if you notice more mucus or a change in the color of your mucus. ? · You are not getting better as expected. Where can you learn more? Go to http://elke-edouard.info/. Enter H333 in the search box to learn more about \"Bronchitis: Care Instructions. \"  Current as of: May 12, 2017  Content Version: 11.4  © 5457-8462 [employer]. Care instructions adapted under license by [employer] (which disclaims liability or warranty for this information). If you have questions about a medical condition or this instruction, always ask your healthcare professional. [address] any warranty or liability for your use of this information.

## 2017-11-12 NOTE — UC PROVIDER NOTE
Patient is a 73 y.o. male presenting with cough. The history is provided by the patient. Cough   This is a new problem. Episode onset: 1 month ago; worsening in the past 2-3 days. The problem occurs every few minutes. The problem has been gradually worsening. The cough is productive of sputum. There has been no fever. Associated symptoms include rhinorrhea. Pertinent negatives include no chest pain, no chills, no sweats, no ear congestion, no ear pain, no headaches, no sore throat, no myalgias, no shortness of breath, no wheezing, no nausea and no vomiting. He has tried decongestants for the symptoms. The treatment provided no relief. His past medical history does not include asthma. Past Medical History:   Diagnosis Date    Adopted     Arthritis     Went to see Dr. Chinedu Mazariegos 2013, and was told he has arthritis in the hands and shoulder.  Back pain     [facility] 10/18/2012    Hypertension     Hypertriglyceridemia 8/19/2014    Liver disease     hx hepatitis C 9 yrs. ago- treated    Shingles     Unspecified adverse effect of anesthesia     BLOOD PRESSURE WENT EXTREMILY HIGH DURING ANESTHESIA for lt rotator cuff 2009        Past Surgical History:   Procedure Laterality Date    HX CERVICAL FUSION  2012    C3-C5; Dr. Jones Guardian HX ORTHOPAEDIC  2009    Left Rotator cuff repair    HX ORTHOPAEDIC  7/2011    Right Rotator cuff repair    HX POLYPECTOMY  2011    Dr. Manolo Dockery           History reviewed. No pertinent family history. Social History     Social History    Marital status:      Spouse name: N/A    Number of children: N/A    Years of education: N/A     Occupational History    Not on file.      Social History Main Topics    Smoking status: Former Smoker     Packs/day: 1.00     Years: 30.00     Types: Cigarettes     Quit date: 7/25/1997    Smokeless tobacco: Never Used    Alcohol use 0.0 oz/week     0 Standard drinks or equivalent per week      Comment: occasional    Drug use: Not on file    Sexual activity: Not on file     Other Topics Concern    Not on file     Social History Narrative                ALLERGIES: Codeine; Metformin; Oxycodone; and Zostavax [zoster vaccine live (pf)]    Review of Systems   Constitutional: Negative for chills and fever. HENT: Positive for congestion and rhinorrhea. Negative for ear pain and sore throat. Respiratory: Positive for cough. Negative for shortness of breath and wheezing. Cardiovascular: Negative for chest pain and palpitations. Gastrointestinal: Negative for nausea and vomiting. Musculoskeletal: Negative for myalgias. Skin: Negative for rash. Neurological: Negative for headaches. Hematological: Negative for adenopathy. Vitals:    11/12/17 1510   BP: (!) 129/93   Pulse: 72   Resp: 18   Temp: 98.4 °F (36.9 °C)   SpO2: 98%   Weight: 79.8 kg (176 lb)   Height: 5' 9\" (1.753 m)       Physical Exam   Constitutional: He appears well-developed and well-nourished. No distress. HENT:   Right Ear: Tympanic membrane, external ear and ear canal normal.   Left Ear: Tympanic membrane, external ear and ear canal normal.   Nose: Rhinorrhea present. Right sinus exhibits no maxillary sinus tenderness and no frontal sinus tenderness. Left sinus exhibits no maxillary sinus tenderness and no frontal sinus tenderness. Mouth/Throat: Oropharynx is clear and moist and mucous membranes are normal. No oropharyngeal exudate, posterior oropharyngeal edema, posterior oropharyngeal erythema or tonsillar abscesses. Cardiovascular: Normal rate, regular rhythm and normal heart sounds. Pulmonary/Chest: Effort normal and breath sounds normal. No respiratory distress. He has no wheezes. He has no rales. Lymphadenopathy:     He has no cervical adenopathy. Neurological: He is alert. Skin: He is not diaphoretic. Psychiatric: He has a normal mood and affect.  His behavior is normal. Judgment and thought content normal.   Nursing note and vitals reviewed. MDM     Differential Diagnosis; Clinical Impression; Plan:     CLINICAL IMPRESSION:  Acute bronchitis, unspecified organism  (primary encounter diagnosis)    Plan:  1. Zpak  2. Tessalon prn   3. PCP if no improvement  Amount and/or Complexity of Data Reviewed:   Tests in the radiology section of CPT®:  Ordered and reviewed  Risk of Significant Complications, Morbidity, and/or Mortality:   Presenting problems: Moderate  Diagnostic procedures: Moderate  Management options:   Moderate  Progress:   Patient progress:  Stable      Procedures

## 2017-11-24 ENCOUNTER — HOSPITAL ENCOUNTER (EMERGENCY)
Age: 67
Discharge: HOME | End: 2017-11-24
Attending: FAMILY MEDICINE

## 2017-11-24 VITALS
WEIGHT: 175.2 LBS | HEART RATE: 65 BPM | OXYGEN SATURATION: 98 % | RESPIRATION RATE: 22 BRPM | BODY MASS INDEX: 25.95 KG/M2 | TEMPERATURE: 97.7 F | HEIGHT: 69 IN | SYSTOLIC BLOOD PRESSURE: 146 MMHG | DIASTOLIC BLOOD PRESSURE: 62 MMHG

## 2017-11-24 DIAGNOSIS — R05.2 SUBACUTE COUGH: Primary | ICD-10-CM

## 2017-11-24 NOTE — UC PROVIDER NOTE
HPI Comments:     Here for continued cough; total duration approx 6 weeks. It is dry to productive. Present throughout the day. Some greenish mucus. Recently had a negative chest x ray at hist last office visit here approx 1 week ago and recently been on 2 rounds of antibiotics. Has not had improvement since that time. No fever, chills, SOB, no pleuritic chest pain. Some occasional wheezing. No alleviating medications. No night sweats. Patient is a 73 y.o. male presenting with cough. Cough          Past Medical History:   Diagnosis Date    Adopted     Arthritis     Went to see Dr. Quirino Worthington 2013, and was told he has arthritis in the hands and shoulder.  Back pain     DM (diabetes mellitus) ([facility] [place] [facility] 10/18/2012    Hypertension     Hypertriglyceridemia 8/19/2014    Liver disease     hx hepatitis C 9 yrs. ago- treated    Shingles     Unspecified adverse effect of anesthesia     BLOOD PRESSURE WENT EXTREMILY HIGH DURING ANESTHESIA for lt rotator cuff 2009        Past Surgical History:   Procedure Laterality Date    HX CERVICAL FUSION  2012    C3-C5; Dr. Betsy Cruz HX ORTHOPAEDIC  2009    Left Rotator cuff repair    HX ORTHOPAEDIC  7/2011    Right Rotator cuff repair    HX POLYPECTOMY  2011    Dr. Bradshaw Rom           History reviewed. No pertinent family history. Social History     Social History    Marital status:      Spouse name: N/A    Number of children: N/A    Years of education: N/A     Occupational History    Not on file.      Social History Main Topics    Smoking status: Former Smoker     Packs/day: 1.00     Years: 30.00     Types: Cigarettes     Quit date: 7/25/1997    Smokeless tobacco: Never Used    Alcohol use 0.0 oz/week     0 Standard drinks or equivalent per week      Comment: occasional    Drug use: Not on file    Sexual activity: Not on file     Other Topics Concern    Not on file     Social History Narrative ALLERGIES: Codeine; Metformin; Oxycodone; and Zostavax [zoster vaccine live (pf)]    Review of Systems   Respiratory: Positive for cough. Vitals:    11/24/17 0928   BP: 146/62   Pulse: 65   Resp: 22   Temp: 97.7 °F (36.5 °C)   SpO2: 98%   Weight: 79.5 kg (175 lb 3.2 oz)   Height: 5' 9\" (1.753 m)       Physical Exam   Constitutional: He is oriented to person, place, and time. No distress. Does not appear ill, well hydrated   HENT:   Right Ear: External ear normal.   Left Ear: External ear normal.   Nose: Nose normal.   Mouth/Throat: Oropharynx is clear and moist.   Mild decrease in nasal patency bilaterally. Eyes: EOM are normal. Pupils are equal, round, and reactive to light. No scleral icterus. Neck: Neck supple. Cardiovascular: Regular rhythm and normal heart sounds. Exam reveals no gallop and no friction rub. No murmur heard. Pulmonary/Chest: Effort normal. No respiratory distress. He has no wheezes. He has no rales. Lymphadenopathy:     He has no cervical adenopathy. Neurological: He is alert and oriented to person, place, and time. No cranial nerve deficit. Skin: Skin is warm and dry. No rash noted. He is not diaphoretic. No erythema. No pallor. Psychiatric: He has a normal mood and affect. His behavior is normal. Judgment and thought content normal.   Nursing note and vitals reviewed. MDM     Differential Diagnosis; Clinical Impression; Plan:       CLINICAL IMPRESSION:  (R05) Subacute cough  (primary encounter diagnosis)    Orders Placed This Encounter      ipratropium-albuterol (COMBIVENT RESPIMAT)  mcg/actuation inhaler      Plan:  1. Need PCP re-eval within 7 days; consider work up for chronic cough. No indication to re-x ray. No fever or chills. VS stable. DDX: GERD, upper airway cough syndrome. 2.  See above orders. 3. Immediate follow up for new or worsening.     Risk of Significant Complications, Morbidity, and/or Mortality:   Presenting problems: Moderate  Diagnostic procedures: Moderate  Management options:   Moderate  Progress:   Patient progress:  Stable      Procedures

## 2017-11-24 NOTE — DISCHARGE INSTRUCTIONS
Chronic Cough: Care Instructions  Your Care Instructions    A cough is your body's response to something that bothers your throat or airways. Many things can cause a cough. You might cough because of a cold or the flu, bronchitis, or asthma. Smoking, postnasal drip, allergies, and stomach acid that backs up into your throat also can cause a cough. A cough can be short-term (acute) or long-term (chronic). A chronic cough lasts more than 3 weeks. A chronic cough is often caused by a long-term problem, such as asthma. Another cause might be a medicine, such as an ACE inhibitor. A cough is a symptom, not a disease. To treat a chronic cough, you may need to treat the problem that causes it. You can take a few steps at home to cough less and feel better. Some people cough or clear their throat out of habit for no clear reason. Follow-up care is a key part of your treatment and safety. Be sure to make and go to all appointments, and call your doctor if you are having problems. It's also a good idea to know your test results and keep a list of the medicines you take. How can you care for yourself at home? · Drink plenty of water and other fluids. This may help soothe a dry or sore throat. Honey or lemon juice in hot water or tea may ease a dry cough. · Prop up your head on pillows to help you breathe and ease a cough. · Do not smoke or allow others to smoke around you. Smoke can make a cough worse. If you need help quitting, talk to your doctor about stop-smoking programs and medicines. These can increase your chances of quitting for good. · Avoid exposure to smoke, dust, or other pollutants, or wear a face mask. Check with your doctor or pharmacist to find out which type of face mask will give you the most benefit. · Take cough medicine as directed by your doctor. · Try cough drops to soothe a dry or sore throat. Cough drops don't stop a cough.  Medicine-flavored cough drops are no better than candy-flavored drops or hard candy. Throat clearing  When you have a chronic cough or a disease that may cause this type of cough, you may often feel like you want to clear your throat. This helps bring up mucus. But throat clearing does not always have a cause. Throat clearing can become a habit. The more you do it, the more you feel like you need to do it. But frequent throat clearing can be hard on your vocal cords. It's like slamming them together. To help lessen throat clearing, you can try:  · Taking small sips of water. · Not clearing your throat when you feel you need to. · Swallowing hard when you want to clear your throat. You may want to ask your doctor if a medicine that thins mucus would help. When should you call for help? Call 911 anytime you think you may need emergency care. For example, call if:  ? · You have severe trouble breathing. ?Call your doctor now or seek immediate medical care if:  ? · You cough up blood. ? · You have new or worse trouble breathing. ? · You have a new or higher fever. ? Watch closely for changes in your health, and be sure to contact your doctor if:  ? · You cough more deeply or more often, especially if you notice more mucus or a change in the color of your mucus. ? · You do not get better as expected. Where can you learn more? Go to http://elke-edouard.info/. Enter D357 in the search box to learn more about \"Chronic Cough: Care Instructions. \"  Current as of: May 12, 2017  Content Version: 11.4  © 5207-6382 [employer]. Care instructions adapted under license by [employer] (which disclaims liability or warranty for this information). If you have questions about a medical condition or this instruction, always ask your healthcare professional. [address] any warranty or liability for your use of this information.

## 2017-12-07 ENCOUNTER — OFFICE VISIT (OUTPATIENT)
Dept: INTERNAL MEDICINE CLINIC | Age: 67
End: 2017-12-07

## 2017-12-07 VITALS
DIASTOLIC BLOOD PRESSURE: 60 MMHG | WEIGHT: 176.8 LBS | RESPIRATION RATE: 16 BRPM | BODY MASS INDEX: 26.19 KG/M2 | HEART RATE: 99 BPM | OXYGEN SATURATION: 99 % | HEIGHT: 69 IN | SYSTOLIC BLOOD PRESSURE: 109 MMHG | TEMPERATURE: 97.7 F

## 2017-12-07 DIAGNOSIS — R30.0 DYSURIA: ICD-10-CM

## 2017-12-07 DIAGNOSIS — R06.89 DYSPNEA AND RESPIRATORY ABNORMALITY: Primary | ICD-10-CM

## 2017-12-07 DIAGNOSIS — J40 BRONCHITIS: ICD-10-CM

## 2017-12-07 DIAGNOSIS — R06.2 WHEEZING: ICD-10-CM

## 2017-12-07 DIAGNOSIS — R06.00 DYSPNEA, UNSPECIFIED TYPE: Primary | ICD-10-CM

## 2017-12-07 DIAGNOSIS — R06.00 DYSPNEA AND RESPIRATORY ABNORMALITY: Primary | ICD-10-CM

## 2017-12-07 LAB
BILIRUB UR QL STRIP: NEGATIVE
GLUCOSE UR-MCNC: NEGATIVE MG/DL
KETONES P FAST UR STRIP-MCNC: NEGATIVE MG/DL
PH UR STRIP: 5.5 [PH] (ref 4.6–8)
PROT UR QL STRIP: NEGATIVE
SP GR UR STRIP: 1.02 (ref 1–1.03)
UA UROBILINOGEN AMB POC: NORMAL (ref 0.2–1)
URINALYSIS CLARITY POC: CLEAR
URINALYSIS COLOR POC: YELLOW
URINE BLOOD POC: NEGATIVE
URINE LEUKOCYTES POC: NEGATIVE
URINE NITRITES POC: NEGATIVE

## 2017-12-07 RX ORDER — PREDNISONE 10 MG/1
TABLET ORAL
Qty: 21 TAB | Refills: 0 | Status: SHIPPED | OUTPATIENT
Start: 2017-12-07 | End: 2019-06-06

## 2017-12-07 RX ORDER — LEVOFLOXACIN 750 MG/1
750 TABLET ORAL DAILY
Qty: 7 TAB | Refills: 0 | Status: SHIPPED | OUTPATIENT
Start: 2017-12-07 | End: 2017-12-14

## 2017-12-07 RX ORDER — OXYCODONE HYDROCHLORIDE 5 MG/1
5 TABLET ORAL
Qty: 30 TAB | Refills: 0 | Status: SHIPPED | OUTPATIENT
Start: 2017-12-07 | End: 2018-04-25 | Stop reason: SDUPTHER

## 2017-12-07 NOTE — PROGRESS NOTES
1. Have you been to the ER, urgent care clinic since your last visit? Hospitalized since your last visit? [facility]   2. Have you seen or consulted any other health care providers outside of the [facility] since your last visit? Include any pap smears or colon screening.  no

## 2017-12-07 NOTE — PROGRESS NOTES
SUBJECTIVE  Mr. Morgan Osler presents today acutely for     Chief Complaint   Patient presents with    URI     pt here today c/o coughing up mucous, hacking cough, hoarse, and sob; pt was seen at [facility] 3 diffrent times        \"I've had this for a few months. I've been to urgent care a couple of times and have been through two rounds of antibiotics. When I went back the third time they gave me an inhaler to use. It's just not going away. It feels like my breathing is just a little more labored. \"  Sputum is green and thick+sticky. Also having some burning and urgency and frequency. From his last  visit 11/24:   Here for continued cough; total duration approx 6 weeks. It is dry to productive. Present throughout the day. Some greenish mucus. Recently had a negative chest x ray at hist last office visit here approx 1 week ago and recently been on 2 rounds of antibiotics. Has not had improvement since that time. No fever, chills, SOB, no pleuritic chest pain. Some occasional wheezing. No alleviating medications. No night sweats.     Patient is a 73 y.o. male presenting with cough. Current Outpatient Prescriptions on File Prior to Visit   Medication Sig Dispense Refill    ipratropium-albuterol (COMBIVENT RESPIMAT)  mcg/actuation inhaler Take 1 Puff by inhalation every six (6) hours as needed for Wheezing. 1 Inhaler 0    solifenacin (VESICARE) 5 mg tablet Take 5 mg by mouth daily.  valsartan (DIOVAN) 160 mg tablet Take 1 Tab by mouth nightly. 90 Tab 3    fenofibrate nanocrystallized (TRICOR) 145 mg tablet Take 1 Tab by mouth daily. 90 Tab 3    cyclobenzaprine (FLEXERIL) 10 mg tablet Take 1 Tab by mouth three (3) times daily as needed for Muscle Spasm(s). 30 Tab 1    pramoxine-hydrocortisone (PROTOFOAM-HC) 2.5-1 % topical cream Apply  to affected area three (3) times daily.  10 g 1    PREVIDENT 5000 PLUS 1.1 % crea Use as directed 1 Tube 11    desonide (TRIDESILON) 0.05 % cream Apply  to affected area two (2) times a day. 15 g 1    aspirin 81 mg tablet Take 81 mg by mouth.  oxyCODONE IR (ROXICODONE) 5 mg immediate release tablet Take 1 Tab by mouth every four (4) hours as needed for Pain. 30 Tab 0     No current facility-administered medications on file prior to visit. OBJECTIVE  Visit Vitals    /60 (BP 1 Location: Left arm, BP Patient Position: Sitting)    Pulse 99    Temp 97.7 °F (36.5 °C) (Oral)    Resp 16    Ht 5' 9\" (1.753 m)    Wt 176 lb 12.8 oz (80.2 kg)    SpO2 99%    BMI 26.11 kg/m2     Gen: Pleasant 73 y.o.  male in NAD.   HEENT: PERRLA. EOMI. OP moist and pink.  Neck: Supple.  No LAD.  HEART: RRR, No M/G/R.   LUNGS: CTAB No W/R.   ABDOMEN: S, NT, ND, BS+.   EXTREMITIES: Warm. No C/C/E. MUSCULOSKELETAL: Normal ROM, muscle strength 5/5 all groups. NEURO: Alert and oriented x 3.  Cranial nerves grossly intact.  No focal sensory or motor deficits noted. SKIN: Warm. Dry. No rashes or other lesions noted. ASSESSMENT / PLAN    1. Dyspnea, unspecified type / Wheezing +Ongoing acute bronchitis. -     PULMONARY FUNCTION TEST; Future  -     predniSONE (STERAPRED DS) 10 mg dose pack; See administration instruction per 10mg dose pack  - LEvaquin    2. Dysuria: Normal UA. Defer to Dr. Cl Colindres as desired. -     AMB POC URINALYSIS DIP STICK AUTO W/O MICRO    I have reviewed with the patient details of the assessment and plan and all questions were answered. Relevant patient education was performed. Follow-up Disposition:  Return if symptoms worsen or fail to improve.

## 2017-12-07 NOTE — MR AVS SNAPSHOT
Visit Information Date & Time Provider Department Dept. Phone Encounter #  
 12/7/2017 10:45 AM Shwetha Chacko, [address] [id number] Your Appointments 12/19/2017  9:30 AM  
ROUTINE CARE with Shwetha Chacko MD  
[facility]) Appt Note: 4 mon f/u  
 [facility] [phone]  
[address] [place] [address] [address] [place] [address] Upcoming Health Maintenance Date Due DTaP/Tdap/Td series (1 - Tdap) 12/24/1972 Pneumococcal 65+ Low/Medium Risk (1 of 2 - PCV13) 12/24/2016 MEDICARE YEARLY EXAM 12/24/2016 Influenza Age 5 to Adult 8/1/2017 EYE EXAM RETINAL OR DILATED Q1 8/16/2017 MICROALBUMIN Q1 1/18/2018 FOOT EXAM Q1 1/31/2018 HEMOGLOBIN A1C Q6M 2/22/2018 GLAUCOMA SCREENING Q2Y 8/16/2018 LIPID PANEL Q1 8/22/2018 COLONOSCOPY 3/14/2022 Allergies as of 12/7/2017  Review Complete On: 12/7/2017 By: Louise Monsalve Severity Noted Reaction Type Reactions Codeine  07/25/2011   Systemic Other (comments) RXTREME STOMACH PAIN Metformin  10/16/2015    Other (comments) Pain Oxycodone  11/06/2012   Intolerance Rash Zostavax [Zoster Vaccine Live (Pf)]  02/18/2015    Rash Current Immunizations  Never Reviewed Name Date Influenza Vaccine 10/1/2016, 11/1/2015, 10/1/2014 Not reviewed this visit You Were Diagnosed With   
  
 Codes Comments Dyspnea, unspecified type    -  Primary ICD-10-CM: R06.00 
ICD-9-CM: 786.09 Dysuria     ICD-10-CM: R30.0 ICD-9-CM: 255. 1 Wheezing     ICD-10-CM: R06.2 ICD-9-CM: 786.07 Vitals BP Pulse Temp Resp Height(growth percentile) Weight(growth percentile) 109/60 (BP 1 Location: Left arm, BP Patient Position: Sitting) 99 97.7 °F (36.5 °C) (Oral) 16 5' 9\" (1.753 m) 176 lb 12.8 oz (80.2 kg) SpO2 BMI Smoking Status 99% 26.11 kg/m2 Former Smoker Vitals History BMI and BSA Data Body Mass Index Body Surface Area  
 26.11 kg/m 2 1.98 m 2 Preferred Pharmacy Pharmacy Name Phone [facility], [address] AT [address] [place] [address] Drive [phone] Your Updated Medication List  
  
   
This list is accurate as of: 12/7/17 11:15 AM.  Always use your most recent med list.  
  
  
  
  
 aspirin 81 mg tablet Take 81 mg by mouth. cyclobenzaprine 10 mg tablet Commonly known as:  FLEXERIL Take 1 Tab by mouth three (3) times daily as needed for Muscle Spasm(s). desonide 0.05 % cream  
Commonly known as:  Leobardo Kobus Apply  to affected area two (2) times a day. fenofibrate nanocrystallized 145 mg tablet Commonly known as:  [employer] Take 1 Tab by mouth daily. ipratropium-albuterol  mcg/actuation inhaler Commonly known as:  Sreedhar Sers Take 1 Puff by inhalation every six (6) hours as needed for Wheezing. oxyCODONE IR 5 mg immediate release tablet Commonly known as:  [place] Take 1 Tab by mouth every four (4) hours as needed for Pain. pramoxine-hydrocortisone 2.5-1 % topical cream  
Commonly known as:  PROTOFOAM-HC Apply  to affected area three (3) times daily. PREVIDENT 5000 PLUS 1.1 % Crea Generic drug:  fluoride (sodium) Use as directed  
  
 valsartan 160 mg tablet Commonly known as:  DIOVAN Take 1 Tab by mouth nightly. VESIcare 5 mg tablet Generic drug:  solifenacin Take 5 mg by mouth daily. We Performed the Following AMB POC URINALYSIS DIP STICK AUTO W/O MICRO [50630 CPT(R)] Introducing [facility]! Romayne Duster introduces [employer] patient portal. Now you can access parts of your medical record, email your doctor's office, and request medication refills online. 1. In your internet browser, go to [email or web address]. [employer] 2. Click on the First Time User? Click Here link in the Sign In box. You will see the New Member Sign Up page. 3. Enter your [employer] Access Code exactly as it appears below. You will not need to use this code after youve completed the sign-up process. If you do not sign up before the expiration date, you must request a new code. · [employer] Access Code: [email or web address] Expires: 2/10/2018  2:55 PM 
 
4. Enter the last four digits of your Social Security Number (xxxx) and Date of Birth (mm/dd/yyyy) as indicated and click Submit. You will be taken to the next sign-up page. 5. Create a [employer] ID. This will be your [employer] login ID and cannot be changed, so think of one that is secure and easy to remember. 6. Create a [employer] password. You can change your password at any time. 7. Enter your Password Reset Question and Answer. This can be used at a later time if you forget your password. 8. Enter your e-mail address. You will receive e-mail notification when new information is available in [address] 9. Click Sign Up. You can now view and download portions of your medical record. 10. Click the Download Summary menu link to download a portable copy of your medical information. If you have questions, please visit the Frequently Asked Questions section of the [employer] website. Remember, [employer] is NOT to be used for urgent needs. For medical emergencies, dial 911. Now available from your iPhone and Android! Please provide this summary of care documentation to your next provider. Your primary care clinician is listed as [place]. If you have any questions after today's visit, please call [phone].

## 2017-12-13 ENCOUNTER — TELEPHONE (OUTPATIENT)
Dept: INTERNAL MEDICINE CLINIC | Age: 67
End: 2017-12-13

## 2017-12-13 NOTE — TELEPHONE ENCOUNTER
----- Message from Brennan Patton MD sent at 12/12/2017  5:30 PM EST -----  Please call the patient and let the patient know that his test result(s) is/are normal.  Thanks. Edilia Gifford.

## 2017-12-15 ENCOUNTER — HOSPITAL ENCOUNTER (OUTPATIENT)
Dept: PULMONOLOGY | Age: 67
Discharge: HOME | End: 2017-12-15
Attending: INTERNAL MEDICINE
Payer: COMMERCIAL

## 2017-12-15 DIAGNOSIS — R06.2 WHEEZING: ICD-10-CM

## 2017-12-15 PROCEDURE — 94729 DIFFUSING CAPACITY: CPT

## 2017-12-15 PROCEDURE — 94060 EVALUATION OF WHEEZING: CPT

## 2017-12-15 PROCEDURE — 94726 PLETHYSMOGRAPHY LUNG VOLUMES: CPT

## 2017-12-18 NOTE — PROCEDURES
[facility]  PULMONARY FUNCTION    Woody Reach  MR#: [de-identified]  : 1950  ACCOUNT #: [de-identified]   DATE OF SERVICE: 12/15/2017    REASON FOR TEST: Chronic cough. Spirometry and lung volumes were performed and they reveal:  1. Mild airflow obstruction. 2.  No restrictive lung disease. 3.  Air trapping is present. 4.  No improvement on FEV1 after bronchodilators. 5.  DLCO is within normal limits. 6.  Flow volume loop is consistent with airflow obstruction.       MD Boom Muller / Erlin Flowers  D: 12/15/2017 15:04     T: 2017 11:22  JOB #: [id number]  CC: _____ [place] Flax

## 2017-12-20 ENCOUNTER — TELEPHONE (OUTPATIENT)
Dept: INTERNAL MEDICINE CLINIC | Age: 67
End: 2017-12-20

## 2017-12-20 NOTE — TELEPHONE ENCOUNTER
----- Message from Kade Navarro MD sent at 12/18/2017  4:55 PM EST -----  Mild airflow obstruction noted. Continue inhaler.   BJF

## 2017-12-21 ENCOUNTER — TELEPHONE (OUTPATIENT)
Dept: INTERNAL MEDICINE CLINIC | Age: 67
End: 2017-12-21

## 2017-12-21 RX ORDER — AMOXICILLIN 500 MG/1
500 CAPSULE ORAL 3 TIMES DAILY
Qty: 15 CAP | Refills: 0 | Status: SHIPPED | OUTPATIENT
Start: 2017-12-21 | End: 2017-12-26

## 2017-12-21 NOTE — TELEPHONE ENCOUNTER
Pt is returning a call from \"Trista\". Best contact number [phone]. .       Message received & copied from [facility]

## 2017-12-21 NOTE — TELEPHONE ENCOUNTER
----- Message from Krishna Ryan sent at 12/20/2017  3:14 PM EST -----  Regarding: /Telephone  Pt has abnormal lab results that was preformed on 12/07/17 he has not received a call from the doctor regarding the issue as well as not receiving any form or medication regarding the issue.    Pt best contact [phone]        Message copied/pasted from [facility]

## 2017-12-21 NOTE — TELEPHONE ENCOUNTER
Identified patient 2 identifiers verified. Patient still coughing up green secretions, no temp and wants to know if it is something else he can do besides the inhaler.

## 2018-04-25 DIAGNOSIS — M48.02 SPINAL STENOSIS IN CERVICAL REGION: Primary | ICD-10-CM

## 2018-04-25 RX ORDER — OXYCODONE HYDROCHLORIDE 5 MG/1
5 TABLET ORAL
Qty: 30 TAB | Refills: 0 | Status: SHIPPED | OUTPATIENT
Start: 2018-04-25 | End: 2018-08-06 | Stop reason: SDUPTHER

## 2018-04-25 RX ORDER — VALSARTAN 160 MG/1
160 TABLET ORAL
Qty: 90 TAB | Refills: 3 | Status: SHIPPED | OUTPATIENT
Start: 2018-04-25 | End: 2018-07-31 | Stop reason: SDUPTHER

## 2018-04-26 ENCOUNTER — LAB ONLY (OUTPATIENT)
Dept: INTERNAL MEDICINE CLINIC | Age: 68
End: 2018-04-26

## 2018-04-26 DIAGNOSIS — M48.02 SPINAL STENOSIS IN CERVICAL REGION: ICD-10-CM

## 2018-04-26 DIAGNOSIS — E78.1 HYPERTRIGLYCERIDEMIA: ICD-10-CM

## 2018-04-26 DIAGNOSIS — I10 ESSENTIAL HYPERTENSION: ICD-10-CM

## 2018-04-26 DIAGNOSIS — F11.90 OPIATE USE: ICD-10-CM

## 2018-04-26 DIAGNOSIS — E11.9 TYPE 2 DIABETES MELLITUS WITHOUT COMPLICATION, UNSPECIFIED WHETHER LONG TERM INSULIN USE (HCC): Primary | ICD-10-CM

## 2018-04-27 LAB
ALBUMIN SERPL-MCNC: 4.7 G/DL (ref 3.6–4.8)
ALBUMIN/GLOB SERPL: 1.7 {RATIO} (ref 1.2–2.2)
ALP SERPL-CCNC: 45 IU/L (ref 39–117)
ALT SERPL-CCNC: 32 IU/L (ref 0–44)
AST SERPL-CCNC: 28 IU/L (ref 0–40)
BASOPHILS # BLD AUTO: 0.1 X10E3/UL (ref 0–0.2)
BASOPHILS NFR BLD AUTO: 1 %
BILIRUB SERPL-MCNC: 0.4 MG/DL (ref 0–1.2)
BUN SERPL-MCNC: 17 MG/DL (ref 8–27)
BUN/CREAT SERPL: 17 (ref 10–24)
CALCIUM SERPL-MCNC: 9.2 MG/DL (ref 8.6–10.2)
CHLORIDE SERPL-SCNC: 102 MMOL/L (ref 96–106)
CHOLEST SERPL-MCNC: 148 MG/DL (ref 100–199)
CO2 SERPL-SCNC: 24 MMOL/L (ref 18–29)
CREAT SERPL-MCNC: 1.02 MG/DL (ref 0.76–1.27)
EOSINOPHIL # BLD AUTO: 0.2 X10E3/UL (ref 0–0.4)
EOSINOPHIL NFR BLD AUTO: 4 %
ERYTHROCYTE [DISTWIDTH] IN BLOOD BY AUTOMATED COUNT: 13.8 % (ref 12.3–15.4)
EST. AVERAGE GLUCOSE BLD GHB EST-MCNC: 131 MG/DL
GFR SERPLBLD CREATININE-BSD FMLA CKD-EPI: 76 ML/MIN/1.73
GFR SERPLBLD CREATININE-BSD FMLA CKD-EPI: 88 ML/MIN/1.73
GLOBULIN SER CALC-MCNC: 2.7 G/DL (ref 1.5–4.5)
GLUCOSE SERPL-MCNC: 120 MG/DL (ref 65–99)
HBA1C MFR BLD: 6.2 % (ref 4.8–5.6)
HCT VFR BLD AUTO: 46.4 % (ref 37.5–51)
HDLC SERPL-MCNC: 43 MG/DL
HGB BLD-MCNC: 15.7 G/DL (ref 13–17.7)
IMM GRANULOCYTES # BLD: 0 X10E3/UL (ref 0–0.1)
IMM GRANULOCYTES NFR BLD: 0 %
LDLC SERPL CALC-MCNC: 71 MG/DL (ref 0–99)
LYMPHOCYTES # BLD AUTO: 1.9 X10E3/UL (ref 0.7–3.1)
LYMPHOCYTES NFR BLD AUTO: 32 %
MCH RBC QN AUTO: 31.8 PG (ref 26.6–33)
MCHC RBC AUTO-ENTMCNC: 33.8 G/DL (ref 31.5–35.7)
MCV RBC AUTO: 94 FL (ref 79–97)
MONOCYTES # BLD AUTO: 0.5 X10E3/UL (ref 0.1–0.9)
MONOCYTES NFR BLD AUTO: 8 %
NEUTROPHILS # BLD AUTO: 3.2 X10E3/UL (ref 1.4–7)
NEUTROPHILS NFR BLD AUTO: 55 %
PLATELET # BLD AUTO: 156 X10E3/UL (ref 150–379)
POTASSIUM SERPL-SCNC: 4.3 MMOL/L (ref 3.5–5.2)
PROT SERPL-MCNC: 7.4 G/DL (ref 6–8.5)
RBC # BLD AUTO: 4.93 X10E6/UL (ref 4.14–5.8)
SODIUM SERPL-SCNC: 141 MMOL/L (ref 134–144)
TRIGL SERPL-MCNC: 170 MG/DL (ref 0–149)
VLDLC SERPL CALC-MCNC: 34 MG/DL (ref 5–40)
WBC # BLD AUTO: 5.8 X10E3/UL (ref 3.4–10.8)

## 2018-05-14 ENCOUNTER — OFFICE VISIT (OUTPATIENT)
Dept: INTERNAL MEDICINE CLINIC | Age: 68
End: 2018-05-14

## 2018-05-14 VITALS
SYSTOLIC BLOOD PRESSURE: 105 MMHG | TEMPERATURE: 98.5 F | OXYGEN SATURATION: 95 % | HEART RATE: 64 BPM | DIASTOLIC BLOOD PRESSURE: 63 MMHG | RESPIRATION RATE: 16 BRPM | WEIGHT: 178 LBS | HEIGHT: 69 IN | BODY MASS INDEX: 26.36 KG/M2

## 2018-05-14 DIAGNOSIS — E78.1 HYPERTRIGLYCERIDEMIA: ICD-10-CM

## 2018-05-14 DIAGNOSIS — E11.9 TYPE 2 DIABETES MELLITUS WITHOUT COMPLICATION, UNSPECIFIED WHETHER LONG TERM INSULIN USE (HCC): Primary | ICD-10-CM

## 2018-05-14 DIAGNOSIS — I10 ESSENTIAL HYPERTENSION: ICD-10-CM

## 2018-05-14 NOTE — PROGRESS NOTES
1. Have you been to the ER, urgent care clinic since your last visit? Hospitalized since your last visit?no    2. Have you seen or consulted any other health care providers outside of the [address] [place] [address] since your last visit? Include any pap smears or colon screening.  no

## 2018-05-14 NOTE — MR AVS SNAPSHOT
[address] [place] [address] 
[phone] Patient: Leonora Khan MRN:  [id number] Visit Information Date & Time Provider Department Dept. Phone Encounter #  
 5/14/2018  9:45 AM Gabi Boyce, [address] [place] [address],[address] [phone] [id number] Follow-up Instructions Return in about 4 months (around 9/14/2018) for DM, etc. Upcoming Health Maintenance Date Due DTaP/Tdap/Td series (1 - Tdap) 12/24/1972 Pneumococcal 65+ Low/Medium Risk (1 of 2 - PCV13) 12/24/2016 EYE EXAM RETINAL OR DILATED Q1 8/16/2017 MICROALBUMIN Q1 1/18/2018 FOOT EXAM Q1 1/31/2018 Influenza Age 5 to Adult 8/1/2018 GLAUCOMA SCREENING Q2Y 8/16/2018 HEMOGLOBIN A1C Q6M 10/26/2018 LIPID PANEL Q1 4/26/2019 COLONOSCOPY 3/14/2022 Allergies as of 5/14/2018  Review Complete On: 5/14/2018 By: Gabi Boyce MD  
  
 Severity Noted Reaction Type Reactions Codeine  07/25/2011   Systemic Other (comments) RXTREME STOMACH PAIN Metformin  10/16/2015    Other (comments) Pain Oxycodone  11/06/2012   Intolerance Rash Zostavax [Zoster Vaccine Live (Pf)]  02/18/2015    Rash Current Immunizations  Never Reviewed Name Date Influenza Vaccine 10/1/2016, 11/1/2015, 10/1/2014 Not reviewed this visit You Were Diagnosed With   
  
 Codes Comments Type 2 diabetes mellitus without complication, unspecified whether long term insulin use (HCC)    -  Primary ICD-10-CM: E11.9 ICD-9-CM: 250.00 Essential hypertension     ICD-10-CM: I10 
ICD-9-CM: 401.9 Hypertriglyceridemia     ICD-10-CM: E78.1 ICD-9-CM: 272.1 Vitals BP Pulse Temp Resp Height(growth percentile) Weight(growth percentile) 105/63 (BP 1 Location: Left arm, BP Patient Position: Sitting) 64 98.5 °F (36.9 °C) (Oral) 16 5' 9\" (1.753 m) 178 lb (80.7 kg) SpO2 BMI Smoking Status 95% 26.29 kg/m2 Former Smoker Vitals History BMI and BSA Data Body Mass Index Body Surface Area  
 26.29 kg/m 2 1.98 m 2 Preferred Pharmacy Pharmacy Name Phone [facility] DRUG STORE [facility], [address] AT [address] [place] [address] Drive [phone] Your Updated Medication List  
  
   
This list is accurate as of 5/14/18 10:28 AM.  Always use your most recent med list.  
  
  
  
  
 aspirin 81 mg tablet Take 81 mg by mouth. cyclobenzaprine 10 mg tablet Commonly known as:  FLEXERIL Take 1 Tab by mouth three (3) times daily as needed for Muscle Spasm(s). desonide 0.05 % cream  
Commonly known as:  Sallyanne Ryan Apply  to affected area two (2) times a day. fenofibrate nanocrystallized 145 mg tablet Commonly known as:  [employer] Take 1 Tab by mouth daily. ipratropium-albuterol  mcg/actuation inhaler Commonly known as:  Wilson Ezekiel Take 1 Puff by inhalation every six (6) hours as needed for Wheezing. oxyCODONE IR 5 mg immediate release tablet Commonly known as:  Celestino Bills Take 1 Tab by mouth every four (4) hours as needed for Pain. pramoxine-hydrocortisone 2.5-1 % topical cream  
Commonly known as:  PROTOFOAM-HC Apply  to affected area three (3) times daily. predniSONE 10 mg dose pack Commonly known as:  STERAPRED DS See administration instruction per 10mg dose pack PREVIDENT 5000 PLUS 1.1 % Crea Generic drug:  fluoride (sodium) Use as directed  
  
 valsartan 160 mg tablet Commonly known as:  DIOVAN Take 1 Tab by mouth nightly. VESIcare 5 mg tablet Generic drug:  solifenacin Take 5 mg by mouth daily. Follow-up Instructions Return in about 4 months (around 9/14/2018) for DM, etc.  
  
To-Do List   
 09/01/2018 Lab:  CBC WITH AUTOMATED DIFF   
  
 09/01/2018 Lab:  HEMOGLOBIN A1C WITH EAG   
  
 09/01/2018   Lab:  LIPID PANEL   
  
 09/01/2018 Lab:  METABOLIC PANEL, COMPREHENSIVE   
  
 09/01/2018 Lab:  MICROALBUMIN, UR, RAND Introducing [facility] & HEALTH SERVICES! Fernando Pelaez introduces [employer] patient portal. Now you can access parts of your medical record, email your doctor's office, and request medication refills online. 1. In your internet browser, go to [email or web address] [email or web address] 2. Click on the First Time User? Click Here link in the Sign In box. You will see the New Member Sign Up page. 3. Enter your [employer] Access Code exactly as it appears below. You will not need to use this code after youve completed the sign-up process. If you do not sign up before the expiration date, you must request a new code. · [employer] Access Code: [email or web address] Expires: 7/25/2018  9:03 AM 
 
4. Enter the last four digits of your Social Security Number (xxxx) and Date of Birth (mm/dd/yyyy) as indicated and click Submit. You will be taken to the next sign-up page. 5. Create a [employer] ID. This will be your [employer] login ID and cannot be changed, so think of one that is secure and easy to remember. 6. Create a [employer] password. You can change your password at any time. 7. Enter your Password Reset Question and Answer. This can be used at a later time if you forget your password. 8. Enter your e-mail address. You will receive e-mail notification when new information is available in [address] 9. Click Sign Up. You can now view and download portions of your medical record. 10. Click the Download Summary menu link to download a portable copy of your medical information. If you have questions, please visit the Frequently Asked Questions section of the [employer] website. Remember, [employer] is NOT to be used for urgent needs. For medical emergencies, dial 911. Now available from your iPhone and Android! Please provide this summary of care documentation to your next provider. Your primary care clinician is listed as [place]. If you have any questions after today's visit, please call [phone].

## 2018-05-14 NOTE — PROGRESS NOTES
SUBJECTIVE:   Mr. Deb Espinoza is a 78 y.o. W male who is here for acute complaint; also follow up of routine medical issues. Chief Complaint   Patient presents with    Results     pt here today for f.u on lab results    Referral Follow Up     pt wants to discuss visit with Dr Thais Alonso and upcoming biospy on June 5    Hearing Problem     pt concerned that he is loosing hearing in R ear       He has seen Dr. Thais Alonso, for urinary retention, and put on med. He had cystoscopy showing a \"red patch. \"  Biopsy planned. Lost hearing for a few hours in one ear. Chest congestion from his winter respiratory illness has \"never gone away. \"  Awakens with \"mucus in my throat that takes a while to clear out. \"   Chronic back pain:  It is recalled that he underwent C-spine fusion in 2012, with Dr. Ernie Bowie. Still, he gets tingling and throbbing at times, particularly after long days, or activity. Stress: Ongoing. 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 He hasn't yet addressed the snoring. The R shoulder and arm pain, with tingling in hand, since rotator cuff surgery in 2011--doing much better--\"not hurting at all. \"  Crepitus neck. He still has tiny bit of numbness in R hand. Memory: Tested by Dr. Jojo Ewing in 2014, and no dementia. We noted before: He would like to have his memory checked. He has been a bit forgetful at times--noticing issues with short term recall, losing objects, forgetting the name of a coworker, etc.  His wife is concerned. At this time, he is otherwise doing well and has brought no other complaints to my attention today. For a list of the medical issues addressed today, see the assessment and plan below. PMH: His ophthalmologist is Carl Cox  Past Medical History:   Diagnosis Date    Adopted     Arthritis     Went to see Dr. Dalton Urias 2013, and was told he has arthritis in the hands and shoulder.       Back pain     DM (diabetes mellitus) ([facility] 10/18/2012    Hypertension     Hypertriglyceridemia 8/19/2014    Liver disease     hx hepatitis C 9 yrs. ago- treated    Shingles     Unspecified adverse effect of anesthesia     BLOOD PRESSURE WENT EXTREMILY HIGH DURING ANESTHESIA for lt rotator cuff 2009       PSH:   Past Surgical History:   Procedure Laterality Date    HX CERVICAL FUSION  2012    C3-C5; Dr. Chante Mittal HX ORTHOPAEDIC  2009    Left Rotator cuff repair    HX ORTHOPAEDIC  7/2011    Right Rotator cuff repair    HX POLYPECTOMY  2011    Dr. Tushar Manning: He is allergic to codeine; metformin; oxycodone; and zostavax [zoster vaccine live (pf)]. MEDS:   Current Outpatient Prescriptions   Medication Sig    valsartan (DIOVAN) 160 mg tablet Take 1 Tab by mouth nightly.  oxyCODONE IR (ROXICODONE) 5 mg immediate release tablet Take 1 Tab by mouth every four (4) hours as needed for Pain.  predniSONE (STERAPRED DS) 10 mg dose pack See administration instruction per 10mg dose pack    ipratropium-albuterol (COMBIVENT RESPIMAT)  mcg/actuation inhaler Take 1 Puff by inhalation every six (6) hours as needed for Wheezing.  solifenacin (VESICARE) 5 mg tablet Take 5 mg by mouth daily.  fenofibrate nanocrystallized (TRICOR) 145 mg tablet Take 1 Tab by mouth daily.  cyclobenzaprine (FLEXERIL) 10 mg tablet Take 1 Tab by mouth three (3) times daily as needed for Muscle Spasm(s).  pramoxine-hydrocortisone (PROTOFOAM-HC) 2.5-1 % topical cream Apply  to affected area three (3) times daily.  PREVIDENT 5000 PLUS 1.1 % crea Use as directed    desonide (TRIDESILON) 0.05 % cream Apply  to affected area two (2) times a day.  aspirin 81 mg tablet Take 81 mg by mouth. No current facility-administered medications for this visit. FH: Adopted. SH: He is a . He reports that he quit smoking about 20 years ago. His smoking use included Cigarettes.  He has a 30.00 pack-year smoking history. He has never used smokeless tobacco. He reports that he drinks alcohol. ROS: See above; Complete ROS otherwise negative. OBJECTIVE:   Vitals:   Visit Vitals    /63 (BP 1 Location: Left arm, BP Patient Position: Sitting)    Pulse 64    Temp 98.5 °F (36.9 °C) (Oral)    Resp 16    Ht 5' 9\" (1.753 m)    Wt 178 lb (80.7 kg)    SpO2 95%    BMI 26.29 kg/m2      Gen: Pleasant 78 y.o. W male in NAD. HEENT: PERRLA. EOMI. OP moist and pink. Neck: Supple. No LAD. HEART: RRR, No M/G/R.    LUNGS: CTAB No W/R. ABDOMEN: S, NT, ND, BS+. EXTREMITIES: Warm. No C/C/E.  MUSCULOSKELETAL: Normal ROM, muscle strength 5/5 all groups. NEURO: Alert and oriented x 3. Cranial nerves grossly intact. No focal sensory or motor deficits noted. SKIN: Warm. Dry. No rashes or other lesions noted. Lab Results   Component Value Date/Time    Hemoglobin A1c 6.2 (H) 04/26/2018 09:08 AM       Lab Results   Component Value Date/Time    Cholesterol, total 148 04/26/2018 09:08 AM    HDL Cholesterol 43 04/26/2018 09:08 AM    LDL, calculated 71 04/26/2018 09:08 AM    VLDL, calculated 34 04/26/2018 09:08 AM    Triglyceride 170 (H) 04/26/2018 09:08 AM       ASSESSMENT/ PLAN:      1. Diabetes mellitus: Controlled at last check. Continue metformin bid. 2. Memory disturbance: Stable. No dementia per neuropsych testing. 3. Dyslipidemia: LDL is okay. 4. Rotator cuff (capsule) sprain: Stable. Referred to orthopedics--for now, he wishes to hold off any surgery. 5. History of Hepatitis C s/p treatment: CMP okay. 6. Low libido: Not discussed today. 7. Snoring: Referred prev to orthodontist.  8. Urinary frequency and incontinence: Now on vesicare. Seeing urologist.   9. Biopsy bladder per Dr. Pastor Flaherty. 10. R ear cerumen impaction: Self care discussed. I have reviewed the patient's medications and risks/side effects/benefits were discussed. Diagnosis(-es) explained to patient and questions answered. Literature provided where appropriate.      Follow-up Disposition:  Return in about 4 months (around 9/14/2018) for DM, etc.

## 2018-08-01 RX ORDER — VALSARTAN 160 MG/1
160 TABLET ORAL
Qty: 90 TAB | Refills: 3 | Status: SHIPPED | OUTPATIENT
Start: 2018-08-01 | End: 2019-09-09 | Stop reason: SDUPTHER

## 2018-08-01 RX ORDER — FENOFIBRATE 145 MG/1
145 TABLET, COATED ORAL DAILY
Qty: 90 TAB | Refills: 3 | Status: SHIPPED | OUTPATIENT
Start: 2018-08-01 | End: 2019-06-06

## 2018-08-06 DIAGNOSIS — M48.02 SPINAL STENOSIS IN CERVICAL REGION: ICD-10-CM

## 2018-08-06 RX ORDER — OXYCODONE HYDROCHLORIDE 5 MG/1
5 TABLET ORAL
Qty: 30 TAB | Refills: 0 | Status: SHIPPED | OUTPATIENT
Start: 2018-08-06 | End: 2018-11-27 | Stop reason: SDUPTHER

## 2018-09-13 ENCOUNTER — OFFICE VISIT (OUTPATIENT)
Dept: INTERNAL MEDICINE CLINIC | Age: 68
End: 2018-09-13

## 2018-09-13 VITALS
RESPIRATION RATE: 20 BRPM | TEMPERATURE: 97.9 F | HEART RATE: 59 BPM | SYSTOLIC BLOOD PRESSURE: 116 MMHG | WEIGHT: 178.3 LBS | BODY MASS INDEX: 26.41 KG/M2 | HEIGHT: 69 IN | OXYGEN SATURATION: 94 % | DIASTOLIC BLOOD PRESSURE: 61 MMHG

## 2018-09-13 DIAGNOSIS — E78.1 HYPERTRIGLYCERIDEMIA: ICD-10-CM

## 2018-09-13 DIAGNOSIS — E11.9 TYPE 2 DIABETES MELLITUS WITHOUT COMPLICATION, UNSPECIFIED WHETHER LONG TERM INSULIN USE (HCC): ICD-10-CM

## 2018-09-13 DIAGNOSIS — I10 ESSENTIAL HYPERTENSION: Primary | ICD-10-CM

## 2018-09-13 DIAGNOSIS — S43.421S SPRAIN OF RIGHT ROTATOR CUFF CAPSULE, SEQUELA: ICD-10-CM

## 2018-09-13 DIAGNOSIS — C67.9 MALIGNANT NEOPLASM OF URINARY BLADDER, UNSPECIFIED SITE (HCC): ICD-10-CM

## 2018-09-13 RX ORDER — MIRABEGRON 50 MG/1
TABLET, FILM COATED, EXTENDED RELEASE ORAL
Refills: 0 | COMMUNITY
Start: 2018-06-07 | End: 2019-06-06

## 2018-09-13 NOTE — PROGRESS NOTES
1. Have you been to the ER, urgent care clinic since your last visit? Hospitalized since your last visit?no    2. Have you seen or consulted any other health care providers outside of the [address] [place] [address] since your last visit? Include any pap smears or colon screening.   Bladder cancer procedure

## 2018-09-13 NOTE — MR AVS SNAPSHOT
[address] [facility] 
[phone] Patient: Derek Thacker MRN:  [id number] Visit Information Date & Time Provider Department Dept. Phone Encounter #  
 9/13/2018  9:15 AM Allison Rodriguez, [address] [id number] Follow-up Instructions Return in about 4 months (around 1/13/2019) for HTN. Upcoming Health Maintenance Date Due DTaP/Tdap/Td series (1 - Tdap) 12/24/1972 Pneumococcal 65+ Low/Medium Risk (1 of 2 - PCV13) 12/24/2016 EYE EXAM RETINAL OR DILATED Q1 8/16/2017 MICROALBUMIN Q1 1/18/2018 FOOT EXAM Q1 1/31/2018 Influenza Age 5 to Adult 8/1/2018 GLAUCOMA SCREENING Q2Y 8/16/2018 HEMOGLOBIN A1C Q6M 10/26/2018 LIPID PANEL Q1 4/26/2019 COLONOSCOPY 3/14/2022 Allergies as of 9/13/2018  Review Complete On: 9/13/2018 By: Allison Rodriguez MD  
  
 Severity Noted Reaction Type Reactions Codeine  07/25/2011   Systemic Other (comments) RXTREME STOMACH PAIN Metformin  10/16/2015    Other (comments) Pain Oxycodone  11/06/2012   Intolerance Rash Zostavax [Zoster Vaccine Live (Pf)]  02/18/2015    Rash Current Immunizations  Never Reviewed Name Date Influenza Vaccine 10/1/2016, 11/1/2015, 10/1/2014 Not reviewed this visit You Were Diagnosed With   
  
 Codes Comments Essential hypertension    -  Primary ICD-10-CM: I10 
ICD-9-CM: 401.9 Type 2 diabetes mellitus without complication, unspecified whether long term insulin use (HCC)     ICD-10-CM: E11.9 ICD-9-CM: 250.00 Hypertriglyceridemia     ICD-10-CM: E78.1 ICD-9-CM: 272.1 Sprain of right rotator cuff capsule, sequela     ICD-10-CM: Z54.400I ICD-9-CM: 905.7 Malignant neoplasm of urinary bladder, unspecified site [facility])     ICD-10-CM: C67.9 ICD-9-CM: 188. 9 Vitals BP Pulse Temp Resp Height(growth percentile) Weight(growth percentile) 116/61 (BP 1 Location: Left arm, BP Patient Position: Sitting) (!) 59 97.9 °F (36.6 °C) (Oral) 20 5' 9\" (1.753 m) 178 lb 4.8 oz (80.9 kg) SpO2 BMI Smoking Status 94% 26.33 kg/m2 Former Smoker Vitals History BMI and BSA Data Body Mass Index Body Surface Area  
 26.33 kg/m 2 1.98 m 2 Preferred Pharmacy Pharmacy Name Phone [facility] DRUG STORE [facility], [address] [phone] Your Updated Medication List  
  
   
This list is accurate as of 9/13/18  9:38 AM.  Always use your most recent med list.  
  
  
  
  
 aspirin 81 mg tablet Take 81 mg by mouth. cyclobenzaprine 10 mg tablet Commonly known as:  FLEXERIL Take 1 Tab by mouth three (3) times daily as needed for Muscle Spasm(s). desonide 0.05 % cream  
Commonly known as:  Renetta Carte Apply  to affected area two (2) times a day. fenofibrate nanocrystallized 145 mg tablet Commonly known as:  [employer] Take 1 Tab by mouth daily. ipratropium-albuterol  mcg/actuation inhaler Commonly known as:  Darby Mattock Take 1 Puff by inhalation every six (6) hours as needed for Wheezing. MYRBETRIQ 50 mg ER tablet Generic drug:  mirabegron ER  
  
 oxyCODONE IR 5 mg immediate release tablet Commonly known as:  Dondra Rafa Take 1 Tab by mouth every four (4) hours as needed for Pain. pramoxine-hydrocortisone 2.5-1 % topical cream  
Commonly known as:  PROTOFOAM-HC Apply  to affected area three (3) times daily. predniSONE 10 mg dose pack Commonly known as:  STERAPRED DS See administration instruction per 10mg dose pack PREVIDENT 5000 PLUS 1.1 % Crea Generic drug:  fluoride (sodium) Use as directed  
  
 valsartan 160 mg tablet Commonly known as:  DIOVAN Take 1 Tab by mouth nightly. VESIcare 5 mg tablet Generic drug:  solifenacin Take 5 mg by mouth daily. Follow-up Instructions Return in about 4 months (around 1/13/2019) for HTN. Introducing [facility] & HEALTH SERVICES! [employer] introduces [employer] patient portal. Now you can access parts of your medical record, email your doctor's office, and request medication refills online. 1. In your internet browser, go to [email or web address] 2. Click on the First Time User? Click Here link in the Sign In box. You will see the New Member Sign Up page. 3. Enter your [employer] Access Code exactly as it appears below. You will not need to use this code after youve completed the sign-up process. If you do not sign up before the expiration date, you must request a new code. · [employer] Access Code: [email or web address] Expires: 12/12/2018  9:38 AM 
 
4. Enter the last four digits of your Social Security Number (xxxx) and Date of Birth (mm/dd/yyyy) as indicated and click Submit. You will be taken to the next sign-up page. 5. Create a [employer] ID. This will be your [employer] login ID and cannot be changed, so think of one that is secure and easy to remember. 6. Create a [employer] password. You can change your password at any time. 7. Enter your Password Reset Question and Answer. This can be used at a later time if you forget your password. 8. Enter your e-mail address. You will receive e-mail notification when new information is available in [address] 9. Click Sign Up. You can now view and download portions of your medical record. 10. Click the Download Summary menu link to download a portable copy of your medical information. If you have questions, please visit the Frequently Asked Questions section of the [employer] website. Remember, [employer] is NOT to be used for urgent needs. For medical emergencies, dial 911. Now available from your iPhone and Android! Please provide this summary of care documentation to your next provider. Your primary care clinician is listed as [place]. If you have any questions after today's visit, please call [phone].

## 2018-09-13 NOTE — PROGRESS NOTES
SUBJECTIVE:   Mr. Fidencio Wheeler is a 78 y.o. W male who is here for acute complaint; also follow up of routine medical issues. Chief Complaint   Patient presents with    Diabetes     pt here today for 4 month f.u       Dr. Lilian Marcelo has diagnosed bladder cancer, and administered BCG. Chronic back pain:  It is recalled that he underwent C-spine fusion in 2012, with Dr. Wai Taylor. Still, he gets tingling and throbbing at times, particularly after long days, or activity. 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 He hasn't yet addressed the snoring. The R shoulder and arm pain, with tingling in hand, since rotator cuff surgery in 2011--doing much better--\"not hurting at all. \"  Crepitus neck. He still has tiny bit of numbness in R hand. Memory: Tested by Dr. Hugo Stone in 2014, and no dementia. We noted before: He would like to have his memory checked. He has been a bit forgetful at times--noticing issues with short term recall, losing objects, forgetting the name of a coworker, etc.  His wife is concerned. At this time, he is otherwise doing well and has brought no other complaints to my attention today. For a list of the medical issues addressed today, see the assessment and plan below. PMH: His ophthalmologist is Rico Crabtree  Past Medical History:   Diagnosis Date    Adopted     Arthritis     Went to see Dr. Tristen Morataya 2013, and was told he has arthritis in the hands and shoulder.  Back pain     Bladder cancer ([address]     procedure6/2018    DM (diabetes mellitus) ([address] 10/18/2012    Hypertension     Hypertriglyceridemia 8/19/2014    Liver disease     hx hepatitis C 9 yrs.  ago- treated    Malignant neoplasm of urinary bladder ([address] 9/13/2018    Shingles     Unspecified adverse effect of anesthesia     BLOOD PRESSURE WENT EXTREMILY HIGH DURING ANESTHESIA for lt rotator cuff 2009       PSH:   Past Surgical History:   Procedure Laterality Date    HX CERVICAL FUSION 2012    C3-C5; Dr. Delmar Crooks HX ORTHOPAEDIC  2009    Left Rotator cuff repair    HX ORTHOPAEDIC  7/2011    Right Rotator cuff repair    HX POLYPECTOMY  2011    Dr. Jaden Maguire: He is allergic to codeine; metformin; oxycodone; and zostavax [zoster vaccine live (pf)]. MEDS:   Current Outpatient Prescriptions   Medication Sig    MYRBETRIQ 50 mg ER tablet     oxyCODONE IR (ROXICODONE) 5 mg immediate release tablet Take 1 Tab by mouth every four (4) hours as needed for Pain.  fenofibrate nanocrystallized (TRICOR) 145 mg tablet Take 1 Tab by mouth daily.  valsartan (DIOVAN) 160 mg tablet Take 1 Tab by mouth nightly.  predniSONE (STERAPRED DS) 10 mg dose pack See administration instruction per 10mg dose pack    ipratropium-albuterol (COMBIVENT RESPIMAT)  mcg/actuation inhaler Take 1 Puff by inhalation every six (6) hours as needed for Wheezing.  solifenacin (VESICARE) 5 mg tablet Take 5 mg by mouth daily.  cyclobenzaprine (FLEXERIL) 10 mg tablet Take 1 Tab by mouth three (3) times daily as needed for Muscle Spasm(s).  pramoxine-hydrocortisone (PROTOFOAM-HC) 2.5-1 % topical cream Apply  to affected area three (3) times daily.  PREVIDENT 5000 PLUS 1.1 % crea Use as directed    desonide (TRIDESILON) 0.05 % cream Apply  to affected area two (2) times a day.  aspirin 81 mg tablet Take 81 mg by mouth. No current facility-administered medications for this visit. FH: Adopted. SH: He is a . He reports that he quit smoking about 21 years ago. His smoking use included Cigarettes. He has a 30.00 pack-year smoking history. He has never used smokeless tobacco. He reports that he drinks alcohol. ROS: See above; Complete ROS otherwise negative.      OBJECTIVE:   Vitals:   Visit Vitals    /61 (BP 1 Location: Left arm, BP Patient Position: Sitting)    Pulse (!) 59    Temp 97.9 °F (36.6 °C) (Oral)    Resp 20    Ht 5' 9\" (1.753 m)    Wt 178 lb 4.8 oz (80.9 kg)    SpO2 94%    BMI 26.33 kg/m2      Gen: Pleasant 78 y.o. W male in NAD. HEENT: PERRLA. EOMI. OP moist and pink. Neck: Supple. No LAD. HEART: RRR, No M/G/R.    LUNGS: CTAB No W/R. ABDOMEN: S, NT, ND, BS+. EXTREMITIES: Warm. No C/C/E.  MUSCULOSKELETAL: Normal ROM, muscle strength 5/5 all groups. NEURO: Alert and oriented x 3. Cranial nerves grossly intact. No focal sensory or motor deficits noted. SKIN: Warm. Dry. No rashes or other lesions noted. Lab Results   Component Value Date/Time    Hemoglobin A1c 6.2 (H) 04/26/2018 09:08 AM       Lab Results   Component Value Date/Time    Cholesterol, total 148 04/26/2018 09:08 AM    HDL Cholesterol 43 04/26/2018 09:08 AM    LDL, calculated 71 04/26/2018 09:08 AM    VLDL, calculated 34 04/26/2018 09:08 AM    Triglyceride 170 (H) 04/26/2018 09:08 AM       ASSESSMENT/ PLAN:      1. Diabetes mellitus: Controlled at last check. Continue metformin bid. 2. Memory disturbance: Stable. No dementia per neuropsych testing. 3. Dyslipidemia: LDL is okay. 4. Rotator cuff (capsule) sprain: Stable. Referred to orthopedics--for now, he wishes to hold off any surgery. 5. History of Hepatitis C s/p treatment: CMP okay. 6. Low libido: Not discussed today. 7. Snoring: Referred prev to orthodontist.  8. Urinary frequency and incontinence: Now on vesicare. Seeing urologist.   9. Bladder cancer, s/p BCG per Dr. Nelly Zuñiga. I have reviewed the patient's medications and risks/side effects/benefits were discussed. Diagnosis(-es) explained to patient and questions answered. Literature provided where appropriate. Follow-up Disposition:  Return in about 4 months (around 1/13/2019) for HTN.

## 2018-09-14 ENCOUNTER — TELEPHONE (OUTPATIENT)
Dept: INTERNAL MEDICINE CLINIC | Age: 68
End: 2018-09-14

## 2018-09-14 LAB
ALBUMIN SERPL-MCNC: 4.7 G/DL (ref 3.6–4.8)
ALBUMIN/GLOB SERPL: 1.7 {RATIO} (ref 1.2–2.2)
ALP SERPL-CCNC: 55 IU/L (ref 39–117)
ALT SERPL-CCNC: 31 IU/L (ref 0–44)
AST SERPL-CCNC: 27 IU/L (ref 0–40)
BASOPHILS # BLD AUTO: 0.1 X10E3/UL (ref 0–0.2)
BASOPHILS NFR BLD AUTO: 1 %
BILIRUB SERPL-MCNC: 0.4 MG/DL (ref 0–1.2)
BUN SERPL-MCNC: 16 MG/DL (ref 8–27)
BUN/CREAT SERPL: 17 (ref 10–24)
CALCIUM SERPL-MCNC: 9.3 MG/DL (ref 8.6–10.2)
CHLORIDE SERPL-SCNC: 105 MMOL/L (ref 96–106)
CHOLEST SERPL-MCNC: 136 MG/DL (ref 100–199)
CO2 SERPL-SCNC: 21 MMOL/L (ref 20–29)
CREAT SERPL-MCNC: 0.96 MG/DL (ref 0.76–1.27)
EOSINOPHIL # BLD AUTO: 0.2 X10E3/UL (ref 0–0.4)
EOSINOPHIL NFR BLD AUTO: 3 %
ERYTHROCYTE [DISTWIDTH] IN BLOOD BY AUTOMATED COUNT: 14 % (ref 12.3–15.4)
EST. AVERAGE GLUCOSE BLD GHB EST-MCNC: 143 MG/DL
GLOBULIN SER CALC-MCNC: 2.7 G/DL (ref 1.5–4.5)
GLUCOSE SERPL-MCNC: 118 MG/DL (ref 65–99)
HBA1C MFR BLD: 6.6 % (ref 4.8–5.6)
HCT VFR BLD AUTO: 44 % (ref 37.5–51)
HDLC SERPL-MCNC: 43 MG/DL
HGB BLD-MCNC: 15.3 G/DL (ref 13–17.7)
IMM GRANULOCYTES # BLD: 0 X10E3/UL (ref 0–0.1)
IMM GRANULOCYTES NFR BLD: 0 %
LDLC SERPL CALC-MCNC: 62 MG/DL (ref 0–99)
LYMPHOCYTES # BLD AUTO: 1.8 X10E3/UL (ref 0.7–3.1)
LYMPHOCYTES NFR BLD AUTO: 27 %
MCH RBC QN AUTO: 31.5 PG (ref 26.6–33)
MCHC RBC AUTO-ENTMCNC: 34.8 G/DL (ref 31.5–35.7)
MCV RBC AUTO: 91 FL (ref 79–97)
MICROALBUMIN UR-MCNC: 37.3 UG/ML
MONOCYTES # BLD AUTO: 0.6 X10E3/UL (ref 0.1–0.9)
MONOCYTES NFR BLD AUTO: 8 %
NEUTROPHILS # BLD AUTO: 4.1 X10E3/UL (ref 1.4–7)
NEUTROPHILS NFR BLD AUTO: 61 %
PLATELET # BLD AUTO: 168 X10E3/UL (ref 150–379)
POTASSIUM SERPL-SCNC: 4.4 MMOL/L (ref 3.5–5.2)
PROT SERPL-MCNC: 7.4 G/DL (ref 6–8.5)
RBC # BLD AUTO: 4.85 X10E6/UL (ref 4.14–5.8)
SODIUM SERPL-SCNC: 140 MMOL/L (ref 134–144)
TRIGL SERPL-MCNC: 155 MG/DL (ref 0–149)
VLDLC SERPL CALC-MCNC: 31 MG/DL (ref 5–40)
WBC # BLD AUTO: 6.7 X10E3/UL (ref 3.4–10.8)

## 2018-11-27 DIAGNOSIS — M48.02 SPINAL STENOSIS IN CERVICAL REGION: ICD-10-CM

## 2018-11-27 RX ORDER — OXYCODONE HYDROCHLORIDE 5 MG/1
5 TABLET ORAL
Qty: 30 TAB | Refills: 0 | Status: SHIPPED | OUTPATIENT
Start: 2018-11-27 | End: 2018-12-03 | Stop reason: SDUPTHER

## 2018-12-03 DIAGNOSIS — M48.02 SPINAL STENOSIS IN CERVICAL REGION: ICD-10-CM

## 2018-12-03 RX ORDER — OXYCODONE HYDROCHLORIDE 5 MG/1
5 TABLET ORAL
Qty: 30 TAB | Refills: 0 | Status: SHIPPED | OUTPATIENT
Start: 2018-12-03 | End: 2019-03-15 | Stop reason: SDUPTHER

## 2018-12-03 NOTE — TELEPHONE ENCOUNTER
2 pt identifiers verified- pt informed that script for oxycodone will be ready for  on 12/4/18 at 8am.

## 2018-12-03 NOTE — TELEPHONE ENCOUNTER
----- Message from Kaela Joya sent at 12/3/2018 10:45 AM EST -----  Regarding: Dr. Chung Meals Morning, The Pt is requesting a refill on Rx of  \"OxyContin 5 mg\". Contact pt when Rx is ready for . Pt's callback: [phone]      Message copied/pasted from [facility]

## 2018-12-14 ENCOUNTER — OFFICE VISIT (OUTPATIENT)
Dept: INTERNAL MEDICINE CLINIC | Age: 68
End: 2018-12-14

## 2018-12-14 VITALS
BODY MASS INDEX: 26.66 KG/M2 | SYSTOLIC BLOOD PRESSURE: 97 MMHG | TEMPERATURE: 97.8 F | HEART RATE: 64 BPM | OXYGEN SATURATION: 97 % | RESPIRATION RATE: 16 BRPM | WEIGHT: 180 LBS | DIASTOLIC BLOOD PRESSURE: 59 MMHG | HEIGHT: 69 IN

## 2018-12-14 DIAGNOSIS — I10 ESSENTIAL HYPERTENSION: Primary | ICD-10-CM

## 2018-12-14 DIAGNOSIS — J06.9 URI, ACUTE: ICD-10-CM

## 2018-12-14 DIAGNOSIS — R19.7 DIARRHEA OF PRESUMED INFECTIOUS ORIGIN: ICD-10-CM

## 2018-12-14 RX ORDER — BENZONATATE 200 MG/1
200 CAPSULE ORAL
Qty: 30 CAP | Refills: 0 | Status: SHIPPED | OUTPATIENT
Start: 2018-12-14 | End: 2018-12-21

## 2018-12-14 NOTE — PROGRESS NOTES
HISTORY OF PRESENT ILLNESS  Francisca Liu is a 78 y.o. male. HPI  Pt normally follows with Dr. Marce Louis (PCP). Pt is here for acute care. Pt c/o feeling ill sx for 1 week  Pt had vomiting, chills, and diarrhea at the beginning of this week  The chills and vomiting stopped but diarrhea continued through the week  Yesterday he developed a scratchy throat, runny nose, and HA  Pt has been having about 3 episodes of diarrhea per day - none yet today  He thinks the diarrhea may have improved somewhat  Pt has been drinking Pedialyte  He took imodium yesterday, which he thinks may have helped  Pt has been eating white rice and unsweetened apple sauce  Checked flu test - negative  Pt's wife is now sick with diarrhea as well  Discussed that this should be viral and self-limiting  Ordered stool studies to complete if sx not improving  Discussed that he can continue imodium OTC for diarrhea  Will give tessalon perles for cough  Will give hurricane spray for sore throat  Advised using flonase for PND    BP is 97/59  It has been low at last several dr visits  He states that he sometimes gets a bit dizzy  Discussed decreasing BP meds until diarrhea resolved d/t dehydration    Patient Active Problem List    Diagnosis Date Noted    Malignant neoplasm of urinary bladder ([facility] 09/13/2018    Dizziness 05/12/2015    Hypertriglyceridemia 08/19/2014    Hypertension 11/01/2013    Spinal stenosis in cervical region 10/18/2012    DM (diabetes mellitus) ([facility] 10/18/2012    Rotator cuff (capsule) sprain 07/27/2011     Current Outpatient Medications   Medication Sig Dispense Refill    MYRBETRIQ 50 mg ER tablet   0    fenofibrate nanocrystallized (TRICOR) 145 mg tablet Take 1 Tab by mouth daily. 90 Tab 3    valsartan (DIOVAN) 160 mg tablet Take 1 Tab by mouth nightly. 90 Tab 3    cyclobenzaprine (FLEXERIL) 10 mg tablet Take 1 Tab by mouth three (3) times daily as needed for Muscle Spasm(s).  30 Tab 1    aspirin 81 mg tablet Take 81 mg by mouth.  oxyCODONE IR (ROXICODONE) 5 mg immediate release tablet Take 1 Tab by mouth every four (4) hours as needed for Pain. 30 Tab 0    predniSONE (STERAPRED DS) 10 mg dose pack See administration instruction per 10mg dose pack 21 Tab 0    ipratropium-albuterol (COMBIVENT RESPIMAT)  mcg/actuation inhaler Take 1 Puff by inhalation every six (6) hours as needed for Wheezing. 1 Inhaler 0    solifenacin (VESICARE) 5 mg tablet Take 5 mg by mouth daily.  pramoxine-hydrocortisone (PROTOFOAM-HC) 2.5-1 % topical cream Apply  to affected area three (3) times daily. 10 g 1    PREVIDENT 5000 PLUS 1.1 % crea Use as directed 1 Tube 11    desonide (TRIDESILON) 0.05 % cream Apply  to affected area two (2) times a day. 15 g 1     Past Surgical History:   Procedure Laterality Date    HX CERVICAL FUSION  2012    C3-C5; Dr. Myrna Pagan HX ORTHOPAEDIC  2009    Left Rotator cuff repair    HX ORTHOPAEDIC  7/2011    Right Rotator cuff repair    HX POLYPECTOMY  2011    Dr. Jackie Mayorga Results   Component Value Date/Time    WBC 6.7 09/13/2018 09:48 AM    HGB 15.3 09/13/2018 09:48 AM    HCT 44.0 09/13/2018 09:48 AM    PLATELET 694 01/50/7207 09:48 AM    MCV 91 09/13/2018 09:48 AM     Lab Results   Component Value Date/Time    Cholesterol, total 136 09/13/2018 09:48 AM    HDL Cholesterol 43 09/13/2018 09:48 AM    LDL, calculated 62 09/13/2018 09:48 AM    Triglyceride 155 (H) 09/13/2018 09:48 AM     Lab Results   Component Value Date/Time    GFR est non-AA 82 09/13/2018 09:48 AM    GFR est AA 95 09/13/2018 09:48 AM    Creatinine 0.96 09/13/2018 09:48 AM    BUN 16 09/13/2018 09:48 AM    Sodium 140 09/13/2018 09:48 AM    Potassium 4.4 09/13/2018 09:48 AM    Chloride 105 09/13/2018 09:48 AM    CO2 21 09/13/2018 09:48 AM        Review of Systems   HENT: Positive for congestion and sore throat. Respiratory: Negative for shortness of breath. Cardiovascular: Negative for chest pain. Gastrointestinal: Positive for diarrhea and vomiting. Neurological: Positive for headaches. Physical Exam   Constitutional: He is oriented to person, place, and time. He appears well-developed and well-nourished. No distress. HENT:   Head: Normocephalic and atraumatic. Right Ear: External ear normal.   Left Ear: External ear normal.   Mouth/Throat: Oropharynx is clear and moist. No oropharyngeal exudate. Mild erythema to BL ears   Eyes: Conjunctivae and EOM are normal. Right eye exhibits no discharge. Left eye exhibits no discharge. Neck: Normal range of motion. Neck supple. Cardiovascular: Normal rate, regular rhythm and normal heart sounds. Exam reveals no gallop and no friction rub. No murmur heard. Pulmonary/Chest: Effort normal and breath sounds normal. No respiratory distress. He has no wheezes. He has no rales. He exhibits no tenderness. Musculoskeletal: Normal range of motion. He exhibits no edema, tenderness or deformity. Lymphadenopathy:     He has no cervical adenopathy. Neurological: He is alert and oriented to person, place, and time. He has normal reflexes. Coordination normal.   Skin: Skin is warm and dry. No rash noted. He is not diaphoretic. No erythema. No pallor. Psychiatric: He has a normal mood and affect. His behavior is normal.       ASSESSMENT and PLAN    ICD-10-CM ICD-9-CM    1. Essential hypertension    BP running on low side, pt has been dehydrated with his diarrhea for last week, will decrease his diovan in half until GI illness is resolved   I10 401.9    2.  Diarrhea of presumed infectious origin    Appears to be viral gastroenteritis, sx improved today, discussed BRAT diet, can use imodium OTC prn, ordered stool studies to complete only if sx not improved next week   R19.7 009.3 C DIFFICILE TOXIN A & B BY EIA      CULTURE, STOOL      OVA+PARASITE + GIARDIA   3. URI, acute    Viral in nature, sx have been going on for a day or so, provided hurricane spray for sore throat and tessalon for cough   J06.9 465.9         Scribed by Hussein Rivers of [facility], as dictated by Dr. Rachele Ricks. Current diagnosis and concerns discussed with pt at length. Pt understands risks and benefits or current treatment plan and medications, and accepts the treatment and medication with any possible risks. Pt asks appropriate questions, which were answered. Pt was instructed to call with any concerns or problems. I have reviewed the note documented by the scribe. The services provided are my own.   The documentation is accurate

## 2019-01-23 ENCOUNTER — OFFICE VISIT (OUTPATIENT)
Dept: INTERNAL MEDICINE CLINIC | Age: 69
End: 2019-01-23

## 2019-01-23 VITALS
WEIGHT: 183.2 LBS | RESPIRATION RATE: 20 BRPM | HEART RATE: 56 BPM | BODY MASS INDEX: 27.13 KG/M2 | DIASTOLIC BLOOD PRESSURE: 56 MMHG | SYSTOLIC BLOOD PRESSURE: 102 MMHG | TEMPERATURE: 98.7 F | HEIGHT: 69 IN | OXYGEN SATURATION: 97 %

## 2019-01-23 DIAGNOSIS — Z23 ENCOUNTER FOR IMMUNIZATION: ICD-10-CM

## 2019-01-23 DIAGNOSIS — M54.32 BILATERAL SCIATICA: ICD-10-CM

## 2019-01-23 DIAGNOSIS — Z00.00 ROUTINE GENERAL MEDICAL EXAMINATION AT A HEALTH CARE FACILITY: Primary | ICD-10-CM

## 2019-01-23 DIAGNOSIS — M54.31 BILATERAL SCIATICA: ICD-10-CM

## 2019-01-23 NOTE — PROGRESS NOTES
SUBJECTIVE:  
Mr. Leonora Khan is a 80 y.o. W male who is here for CPE, also an acute complaint; also follow up of routine medical issues. Chief Complaint Patient presents with  Diabetes  
  pt here today for routine f.u   
 
Dr. Jonathan Hollis has diagnosed bladder cancer, and administered BCG. Chronic back pain: \"Getting worse\", radiating especially down R leg. At night, \"pulsing pain in both legs. \" It is recalled that he underwent C-spine fusion in 2012, with Dr. Darby Lange. Still, he gets tingling and throbbing at times, particularly after long days, or activity. 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 He hasn't yet addressed the snoring. The R shoulder and arm pain, with tingling in hand, since rotator cuff surgery in 2011--doing much better--\"not hurting at all. \"  Crepitus neck. He still has tiny bit of numbness in R hand. Memory: Tested by Dr. Ayesha Montes in 2014, and no dementia. We noted before: He would like to have his memory checked. He has been a bit forgetful at times--noticing issues with short term recall, losing objects, forgetting the name of a coworker, etc.  His wife is concerned. At this time, he is otherwise doing well and has brought no other complaints to my attention today. For a list of the medical issues addressed today, see the assessment and plan below. PMH: His ophthalmologist is Toño Durand Past Medical History:  
Diagnosis Date  Adopted  Arthritis Went to see Dr. Anjana Umanzor 2013, and was told he has arthritis in the hands and shoulder.  Back pain  Bladder cancer ([address]   
 procedure6/2018  DM (diabetes mellitus) ([address] 10/18/2012  Hypertension  Hypertriglyceridemia 8/19/2014  Liver disease   
 hx hepatitis C 9 yrs. ago- treated  Malignant neoplasm of urinary bladder ([address] 9/13/2018  Shingles  Unspecified adverse effect of anesthesia BLOOD PRESSURE WENT EXTREMILY HIGH DURING ANESTHESIA for lt rotator cuff 2009 PSH:  
Past Surgical History:  
Procedure Laterality Date  HX CERVICAL FUSION  2012 C3-C5; Dr. Rowan Caceres  HX CHOLECYSTECTOMY  HX ORTHOPAEDIC  2009 Left Rotator cuff repair  HX ORTHOPAEDIC  7/2011 Right Rotator cuff repair  HX POLYPECTOMY  2011 Dr. Jamir Sage  HX TONSILLECTOMY All: He is allergic to codeine; metformin; oxycodone; and zostavax [zoster vaccine live (pf)]. MEDS:  
Current Outpatient Medications Medication Sig  
 oxyCODONE IR (ROXICODONE) 5 mg immediate release tablet Take 1 Tab by mouth every four (4) hours as needed for Pain.  MYRBETRIQ 50 mg ER tablet  fenofibrate nanocrystallized (TRICOR) 145 mg tablet Take 1 Tab by mouth daily.  valsartan (DIOVAN) 160 mg tablet Take 1 Tab by mouth nightly.  ipratropium-albuterol (COMBIVENT RESPIMAT)  mcg/actuation inhaler Take 1 Puff by inhalation every six (6) hours as needed for Wheezing.  solifenacin (VESICARE) 5 mg tablet Take 5 mg by mouth daily.  cyclobenzaprine (FLEXERIL) 10 mg tablet Take 1 Tab by mouth three (3) times daily as needed for Muscle Spasm(s).  pramoxine-hydrocortisone (PROTOFOAM-HC) 2.5-1 % topical cream Apply  to affected area three (3) times daily.  PREVIDENT 5000 PLUS 1.1 % crea Use as directed  desonide (TRIDESILON) 0.05 % cream Apply  to affected area two (2) times a day.  aspirin 81 mg tablet Take 81 mg by mouth.  benzocaine (HURRICAINE) 20 % spra 2 Sprays by Mucous Membrane route as needed.  predniSONE (STERAPRED DS) 10 mg dose pack See administration instruction per 10mg dose pack No current facility-administered medications for this visit. FH: Adopted. SH: He is a . He reports that he quit smoking about 21 years ago. His smoking use included cigarettes.  He has a 30.00 pack-year smoking history. he has never used smokeless tobacco. He reports that he drinks alcohol. ROS: See above; Complete ROS otherwise negative. OBJECTIVE:  
Vitals:  
Visit Vitals /56 (BP 1 Location: Left arm, BP Patient Position: Sitting) Pulse (!) 56 Temp 98.7 °F (37.1 °C) (Oral) Resp 20 Ht 5' 9\" (1.753 m) Wt 183 lb 3.2 oz (83.1 kg) SpO2 97% BMI 27.05 kg/m² Gen: Pleasant 80 y.o. W male in NAD. HEENT: PERRLA. EOMI. OP moist and pink. Neck: Supple. No LAD. HEART: RRR, No M/G/R.    LUNGS: CTAB No W/R. ABDOMEN: S, NT, ND, BS+. EXTREMITIES: Warm. No C/C/E.  MUSCULOSKELETAL: Normal ROM, muscle strength 5/5 all groups. NEURO: Alert and oriented x 3. Cranial nerves grossly intact. No focal sensory or motor deficits noted. SKIN: Warm. Dry. Cluster of erythematous macule + 1 small blister. Lab Results Component Value Date/Time Hemoglobin A1c 6.6 (H) 09/13/2018 09:48 AM  
 
 
Lab Results Component Value Date/Time Cholesterol, total 136 09/13/2018 09:48 AM  
 HDL Cholesterol 43 09/13/2018 09:48 AM  
 LDL, calculated 62 09/13/2018 09:48 AM  
 VLDL, calculated 31 09/13/2018 09:48 AM  
 Triglyceride 155 (H) 09/13/2018 09:48 AM  
 
 
ASSESSMENT/ PLAN:   
 
CPE: Exam normal except as noted. 1. Sciatica: Worsening. Refer back to his spine doctor, Dr. Rowan Caceres 2. Painful rash R thigh: Has a cluster of erythematous macule + 1 small blister => Likely herpetic. 3. Diabetes mellitus: Controlled at last check. Continue metformin bid. 4. Memory disturbance: Stable. No dementia per neuropsych testing. 5. Dyslipidemia: LDL is okay. 6. Rotator cuff (capsule) sprain: Stable. Referred to orthopedics--for now, he wishes to hold off any surgery. 7. History of Hepatitis C s/p treatment: CMP okay. 8. Low libido: Not discussed today. 9. Snoring: Referred prev to orthodontist. 
10. Urinary frequency and incontinence: Now on vesicare.  Seeing urologist.  
 11. Bladder cancer, s/p BCG per Dr. Cody Soria. I have reviewed the patient's medications and risks/side effects/benefits were discussed. Diagnosis(-es) explained to patient and questions answered. Literature provided where appropriate. Follow-up Disposition: 
Return in about 4 months (around 5/23/2019) for DM.

## 2019-01-23 NOTE — PROGRESS NOTES
1. Have you been to the ER, urgent care clinic since your last visit? Hospitalized since your last visit?no 2. Have you seen or consulted any other health care providers outside of the [address] [place] [address] since your last visit? Include any pap smears or colon screening.  no

## 2019-01-23 NOTE — PATIENT INSTRUCTIONS

## 2019-01-24 LAB
ALBUMIN SERPL-MCNC: 4.9 G/DL (ref 3.6–4.8)
ALBUMIN/GLOB SERPL: 2 {RATIO} (ref 1.2–2.2)
ALP SERPL-CCNC: 54 IU/L (ref 39–117)
ALT SERPL-CCNC: 38 IU/L (ref 0–44)
AST SERPL-CCNC: 29 IU/L (ref 0–40)
BASOPHILS # BLD AUTO: 0.1 X10E3/UL (ref 0–0.2)
BASOPHILS NFR BLD AUTO: 1 %
BILIRUB SERPL-MCNC: 0.4 MG/DL (ref 0–1.2)
BUN SERPL-MCNC: 14 MG/DL (ref 8–27)
BUN/CREAT SERPL: 15 (ref 10–24)
CALCIUM SERPL-MCNC: 9.6 MG/DL (ref 8.6–10.2)
CHLORIDE SERPL-SCNC: 105 MMOL/L (ref 96–106)
CHOLEST SERPL-MCNC: 136 MG/DL (ref 100–199)
CO2 SERPL-SCNC: 25 MMOL/L (ref 20–29)
CREAT SERPL-MCNC: 0.93 MG/DL (ref 0.76–1.27)
EOSINOPHIL # BLD AUTO: 0.2 X10E3/UL (ref 0–0.4)
EOSINOPHIL NFR BLD AUTO: 3 %
ERYTHROCYTE [DISTWIDTH] IN BLOOD BY AUTOMATED COUNT: 14.2 % (ref 12.3–15.4)
EST. AVERAGE GLUCOSE BLD GHB EST-MCNC: 143 MG/DL
GLOBULIN SER CALC-MCNC: 2.5 G/DL (ref 1.5–4.5)
GLUCOSE SERPL-MCNC: 131 MG/DL (ref 65–99)
HBA1C MFR BLD: 6.6 % (ref 4.8–5.6)
HCT VFR BLD AUTO: 45.7 % (ref 37.5–51)
HDLC SERPL-MCNC: 42 MG/DL
HGB BLD-MCNC: 14.9 G/DL (ref 13–17.7)
IMM GRANULOCYTES # BLD AUTO: 0 X10E3/UL (ref 0–0.1)
IMM GRANULOCYTES NFR BLD AUTO: 0 %
LDLC SERPL CALC-MCNC: 64 MG/DL (ref 0–99)
LYMPHOCYTES # BLD AUTO: 1.9 X10E3/UL (ref 0.7–3.1)
LYMPHOCYTES NFR BLD AUTO: 32 %
MCH RBC QN AUTO: 30.8 PG (ref 26.6–33)
MCHC RBC AUTO-ENTMCNC: 32.6 G/DL (ref 31.5–35.7)
MCV RBC AUTO: 95 FL (ref 79–97)
MONOCYTES # BLD AUTO: 0.6 X10E3/UL (ref 0.1–0.9)
MONOCYTES NFR BLD AUTO: 10 %
NEUTROPHILS # BLD AUTO: 3.2 X10E3/UL (ref 1.4–7)
NEUTROPHILS NFR BLD AUTO: 54 %
PLATELET # BLD AUTO: 175 X10E3/UL (ref 150–379)
POTASSIUM SERPL-SCNC: 5.1 MMOL/L (ref 3.5–5.2)
PROT SERPL-MCNC: 7.4 G/DL (ref 6–8.5)
PSA SERPL-MCNC: 1.7 NG/ML (ref 0–4)
RBC # BLD AUTO: 4.83 X10E6/UL (ref 4.14–5.8)
SODIUM SERPL-SCNC: 143 MMOL/L (ref 134–144)
TRIGL SERPL-MCNC: 148 MG/DL (ref 0–149)
VLDLC SERPL CALC-MCNC: 30 MG/DL (ref 5–40)
WBC # BLD AUTO: 5.9 X10E3/UL (ref 3.4–10.8)

## 2019-01-29 ENCOUNTER — TELEPHONE (OUTPATIENT)
Dept: INTERNAL MEDICINE CLINIC | Age: 69
End: 2019-01-29

## 2019-01-29 NOTE — TELEPHONE ENCOUNTER
Pt was seen here in office on 1/23/19, and brought in a physician result form to be completed by Dr Maribel Garcia. Form has been completed and faxed to [phone], approved fax confirmation received. A copy has also been mailed out to pt.

## 2019-03-06 NOTE — MR AVS SNAPSHOT
Visit Information Date & Time Provider Department Dept. Phone Encounter #  
 8/16/2017 10:30 AM Savana Daniel, [address] [place] [address],[address] [phone] [id number] Follow-up Instructions Return in about 4 months (around 12/16/2017) for DM, HTN. Upcoming Health Maintenance Date Due DTaP/Tdap/Td series (1 - Tdap) 12/24/1972 Pneumococcal 65+ Low/Medium Risk (1 of 2 - PCV13) 12/24/2016 MEDICARE YEARLY EXAM 12/24/2016 HEMOGLOBIN A1C Q6M 7/18/2017 INFLUENZA AGE 9 TO ADULT 8/1/2017 EYE EXAM RETINAL OR DILATED Q1 8/16/2017 MICROALBUMIN Q1 1/18/2018 LIPID PANEL Q1 1/18/2018 FOOT EXAM Q1 1/31/2018 GLAUCOMA SCREENING Q2Y 8/16/2018 COLONOSCOPY 3/14/2022 Allergies as of 8/16/2017  Review Complete On: 8/16/2017 By: Savana Daniel MD  
  
 Severity Noted Reaction Type Reactions Codeine  07/25/2011   Systemic Other (comments) RXTREME STOMACH PAIN Metformin  10/16/2015    Other (comments) Pain Oxycodone  11/06/2012   Intolerance Rash Zostavax [Zoster Vaccine Live (Pf)]  02/18/2015    Rash Current Immunizations  Never Reviewed Name Date Influenza Vaccine 10/1/2016, 11/1/2015, 10/1/2014 Not reviewed this visit You Were Diagnosed With   
  
 Codes Comments Type 2 diabetes mellitus without complication, unspecified long term insulin use status (HCC)    -  Primary ICD-10-CM: E11.9 ICD-9-CM: 250.00 Essential hypertension     ICD-10-CM: I10 
ICD-9-CM: 401.9 Hypertriglyceridemia     ICD-10-CM: E78.1 ICD-9-CM: 272.1 Vitals BP Pulse Temp Resp Height(growth percentile) Weight(growth percentile) 109/58 (BP 1 Location: Left arm, BP Patient Position: Sitting) 62 98.7 °F (37.1 °C) (Oral) 18 5' 8\" (1.727 m) 174 lb 3.2 oz (79 kg) SpO2 BMI Smoking Status 95% 26.49 kg/m2 Former Smoker Vitals History BMI and BSA Data  Body Mass Index Body Surface Area  
 26.49 kg/m 2 1.95 m 2  
  
  
 Preferred Pharmacy Pharmacy Name Phone [address] [place] [address], [address] [place] [address] [phone] Your Updated Medication List  
  
   
This list is accurate as of: 8/16/17 10:42 AM.  Always use your most recent med list.  
  
  
  
  
 albuterol 90 mcg/actuation inhaler Commonly known as:  PROVENTIL HFA, VENTOLIN HFA, PROAIR HFA Take 2 Puffs by inhalation every six (6) hours as needed for Wheezing. aspirin 81 mg tablet Take 81 mg by mouth. cyclobenzaprine 10 mg tablet Commonly known as:  FLEXERIL Take 1 Tab by mouth three (3) times daily as needed for Muscle Spasm(s). desonide 0.05 % cream  
Commonly known as:  Reyes Timo Apply  to affected area two (2) times a day. fenofibrate nanocrystallized 145 mg tablet Commonly known as:  [employer] Take 1 Tab by mouth daily. oxyCODONE IR 5 mg immediate release tablet Commonly known as:  Valene Ramirez Take 1 Tab by mouth every four (4) hours as needed for Pain. pramoxine-hydrocortisone 2.5-1 % topical cream  
Commonly known as:  PROTOFOAM-HC Apply  to affected area three (3) times daily. predniSONE 20 mg tablet Commonly known as:  DELTASONE  
60 mg po daily x 3 days, then 40 mg po daily x 3 days, then 20 mg po daily x 3 days, then stop. PREVIDENT 5000 PLUS 1.1 % Crea Generic drug:  fluoride (sodium) Use as directed  
  
 valsartan 160 mg tablet Commonly known as:  DIOVAN Take 1 Tab by mouth nightly. VESIcare 5 mg tablet Generic drug:  solifenacin Take 5 mg by mouth daily. Prescriptions Printed Refills  
 oxyCODONE IR (ROXICODONE) 5 mg immediate release tablet 0 Sig: Take 1 Tab by mouth every four (4) hours as needed for Pain. Class: Print Route: Oral  
  
Follow-up Instructions Return in about 4 months (around 12/16/2017) for DM, HTN. Introducing [facility]! Pinky Reyes introduces [employer] patient portal. Now you can access parts of your medical record, email your doctor's office, and request medication refills online. 1. In your internet browser, go to [email or web address] 2. Click on the First Time User? Click Here link in the Sign In box. You will see the New Member Sign Up page. 3. Enter your [employer] Access Code exactly as it appears below. You will not need to use this code after youve completed the sign-up process. If you do not sign up before the expiration date, you must request a new code. · [employer] Access Code: [email or web address] Expires: 9/4/2017  4:03 PM 
 
4. Enter the last four digits of your Social Security Number (xxxx) and Date of Birth (mm/dd/yyyy) as indicated and click Submit. You will be taken to the next sign-up page. 5. Create a [employer] ID. This will be your [employer] login ID and cannot be changed, so think of one that is secure and easy to remember. 6. Create a [employer] password. You can change your password at any time. 7. Enter your Password Reset Question and Answer. This can be used at a later time if you forget your password. 8. Enter your e-mail address. You will receive e-mail notification when new information is available in [address] 9. Click Sign Up. You can now view and download portions of your medical record. 10. Click the Download Summary menu link to download a portable copy of your medical information. If you have questions, please visit the Frequently Asked Questions section of the [employer] website. Remember, [employer] is NOT to be used for urgent needs. For medical emergencies, dial 911. Now available from your iPhone and Android! Please provide this summary of care documentation to your next provider. Your primary care clinician is listed as [place]. If you have any questions after today's visit, please call [phone]. negative...

## 2019-03-15 DIAGNOSIS — M48.02 SPINAL STENOSIS IN CERVICAL REGION: ICD-10-CM

## 2019-03-15 NOTE — TELEPHONE ENCOUNTER
Pt is needing a refill on OxyContin. Pt would like a call back to discuss Fenofibrate.  Phone: [phone]       Message received & copied from [facility]

## 2019-03-18 RX ORDER — OXYCODONE HYDROCHLORIDE 5 MG/1
5 TABLET ORAL
Qty: 30 TAB | Refills: 0 | Status: SHIPPED | OUTPATIENT
Start: 2019-03-18 | End: 2019-05-31 | Stop reason: SDUPTHER

## 2019-03-19 ENCOUNTER — TELEPHONE (OUTPATIENT)
Dept: INTERNAL MEDICINE CLINIC | Age: 69
End: 2019-03-19

## 2019-03-19 NOTE — TELEPHONE ENCOUNTER
Pt called stating that he has concerns about taking fenofibrate- due to the side effects and long term usage that he read about. Pt wants to know what your thoughts are on this and should he be taking another medication.

## 2019-03-21 NOTE — TELEPHONE ENCOUNTER
Fenofibrate is reasonable as a long term medicine. However, he could stop it and try OTC fish oil ii caps bid for a while.    BJF

## 2019-03-21 NOTE — TELEPHONE ENCOUNTER
Identified patient 2 identifiers verified. Patient aware of Dr. Tiago Caal response. No further question or concerns.

## 2019-04-24 ENCOUNTER — OFFICE VISIT (OUTPATIENT)
Dept: INTERNAL MEDICINE CLINIC | Age: 69
End: 2019-04-24

## 2019-04-24 VITALS
WEIGHT: 183.4 LBS | OXYGEN SATURATION: 93 % | HEIGHT: 69 IN | TEMPERATURE: 98.8 F | RESPIRATION RATE: 18 BRPM | SYSTOLIC BLOOD PRESSURE: 102 MMHG | HEART RATE: 62 BPM | DIASTOLIC BLOOD PRESSURE: 57 MMHG | BODY MASS INDEX: 27.16 KG/M2

## 2019-04-24 DIAGNOSIS — M54.5 LOW BACK PAIN, UNSPECIFIED BACK PAIN LATERALITY, UNSPECIFIED CHRONICITY, WITH SCIATICA PRESENCE UNSPECIFIED: Primary | ICD-10-CM

## 2019-04-24 DIAGNOSIS — R19.7 DIARRHEA, UNSPECIFIED TYPE: ICD-10-CM
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
## 2019-04-24 NOTE — PROGRESS NOTES
1. Have you been to the ER, urgent care clinic since your last visit? Hospitalized since your last visit?no 2. Have you seen or consulted any other health care providers outside of the [address] [place] [address] since your last visit? Include any pap smears or colon screening.  no

## 2019-05-08 ENCOUNTER — TELEPHONE (OUTPATIENT)
Dept: INTERNAL MEDICINE CLINIC | Age: 69
End: 2019-05-08

## 2019-05-08 NOTE — TELEPHONE ENCOUNTER
#[phone] [facility] PT needs an order for pt. Back pain. Please fax over right away as pt is there waiting now she states.   Fax #[phone]

## 2019-05-31 ENCOUNTER — TELEPHONE (OUTPATIENT)
Dept: INTERNAL MEDICINE CLINIC | Age: 69
End: 2019-05-31

## 2019-05-31 DIAGNOSIS — M48.02 SPINAL STENOSIS IN CERVICAL REGION: ICD-10-CM

## 2019-05-31 DIAGNOSIS — E11.9 TYPE 2 DIABETES MELLITUS WITHOUT COMPLICATION, UNSPECIFIED WHETHER LONG TERM INSULIN USE (HCC): Primary | ICD-10-CM

## 2019-05-31 RX ORDER — OXYCODONE HYDROCHLORIDE 5 MG/1
5 TABLET ORAL
Qty: 30 TAB | Refills: 0 | Status: SHIPPED | OUTPATIENT
Start: 2019-05-31 | End: 2019-08-22 | Stop reason: SDUPTHER

## 2019-06-01 LAB
ALBUMIN SERPL-MCNC: 4.9 G/DL (ref 3.6–4.8)
ALBUMIN/CREAT UR: 7.7 MG/G CREAT (ref 0–30)
ALBUMIN/GLOB SERPL: 2.1 {RATIO} (ref 1.2–2.2)
ALP SERPL-CCNC: 60 IU/L (ref 39–117)
ALT SERPL-CCNC: 64 IU/L (ref 0–44)
AST SERPL-CCNC: 41 IU/L (ref 0–40)
BASOPHILS # BLD AUTO: 0 X10E3/UL (ref 0–0.2)
BASOPHILS NFR BLD AUTO: 1 %
BILIRUB SERPL-MCNC: 0.5 MG/DL (ref 0–1.2)
BUN SERPL-MCNC: 16 MG/DL (ref 8–27)
BUN/CREAT SERPL: 19 (ref 10–24)
CALCIUM SERPL-MCNC: 9.3 MG/DL (ref 8.6–10.2)
CHLORIDE SERPL-SCNC: 104 MMOL/L (ref 96–106)
CHOLEST SERPL-MCNC: 150 MG/DL (ref 100–199)
CO2 SERPL-SCNC: 20 MMOL/L (ref 20–29)
CREAT SERPL-MCNC: 0.85 MG/DL (ref 0.76–1.27)
CREAT UR-MCNC: 196.5 MG/DL
EOSINOPHIL # BLD AUTO: 0.2 X10E3/UL (ref 0–0.4)
EOSINOPHIL NFR BLD AUTO: 3 %
ERYTHROCYTE [DISTWIDTH] IN BLOOD BY AUTOMATED COUNT: 14.3 % (ref 12.3–15.4)
EST. AVERAGE GLUCOSE BLD GHB EST-MCNC: 148 MG/DL
GLOBULIN SER CALC-MCNC: 2.3 G/DL (ref 1.5–4.5)
GLUCOSE SERPL-MCNC: 129 MG/DL (ref 65–99)
HBA1C MFR BLD: 6.8 % (ref 4.8–5.6)
HCT VFR BLD AUTO: 45.4 % (ref 37.5–51)
HDLC SERPL-MCNC: 39 MG/DL
HGB BLD-MCNC: 15.6 G/DL (ref 13–17.7)
IMM GRANULOCYTES # BLD AUTO: 0 X10E3/UL (ref 0–0.1)
IMM GRANULOCYTES NFR BLD AUTO: 0 %
LDLC SERPL CALC-MCNC: 79 MG/DL (ref 0–99)
LYMPHOCYTES # BLD AUTO: 2 X10E3/UL (ref 0.7–3.1)
LYMPHOCYTES NFR BLD AUTO: 36 %
MCH RBC QN AUTO: 31.5 PG (ref 26.6–33)
MCHC RBC AUTO-ENTMCNC: 34.4 G/DL (ref 31.5–35.7)
MCV RBC AUTO: 92 FL (ref 79–97)
MICROALBUMIN UR-MCNC: 15.2 UG/ML
MONOCYTES # BLD AUTO: 0.4 X10E3/UL (ref 0.1–0.9)
MONOCYTES NFR BLD AUTO: 8 %
NEUTROPHILS # BLD AUTO: 2.9 X10E3/UL (ref 1.4–7)
NEUTROPHILS NFR BLD AUTO: 52 %
PLATELET # BLD AUTO: 145 X10E3/UL (ref 150–450)
POTASSIUM SERPL-SCNC: 4.3 MMOL/L (ref 3.5–5.2)
PROT SERPL-MCNC: 7.2 G/DL (ref 6–8.5)
RBC # BLD AUTO: 4.96 X10E6/UL (ref 4.14–5.8)
SODIUM SERPL-SCNC: 141 MMOL/L (ref 134–144)
TRIGL SERPL-MCNC: 160 MG/DL (ref 0–149)
VLDLC SERPL CALC-MCNC: 32 MG/DL (ref 5–40)
WBC # BLD AUTO: 5.5 X10E3/UL (ref 3.4–10.8)

## 2019-06-06 ENCOUNTER — OFFICE VISIT (OUTPATIENT)
Dept: INTERNAL MEDICINE CLINIC | Age: 69
End: 2019-06-06

## 2019-06-06 VITALS
RESPIRATION RATE: 16 BRPM | SYSTOLIC BLOOD PRESSURE: 93 MMHG | HEIGHT: 69 IN | TEMPERATURE: 97.6 F | HEART RATE: 59 BPM | OXYGEN SATURATION: 96 % | BODY MASS INDEX: 27.43 KG/M2 | WEIGHT: 185.2 LBS | DIASTOLIC BLOOD PRESSURE: 54 MMHG

## 2019-06-06 DIAGNOSIS — E78.1 HYPERTRIGLYCERIDEMIA: ICD-10-CM

## 2019-06-06 DIAGNOSIS — I10 ESSENTIAL HYPERTENSION: ICD-10-CM

## 2019-06-06 DIAGNOSIS — E11.9 TYPE 2 DIABETES MELLITUS WITHOUT COMPLICATION, UNSPECIFIED WHETHER LONG TERM INSULIN USE (HCC): Primary | ICD-10-CM

## 2019-06-06 DIAGNOSIS — Z86.19 HISTORY OF HEPATITIS C: ICD-10-CM

## 2019-06-06 NOTE — PATIENT INSTRUCTIONS
Office Policies    Phone calls/patient messages:            Please allow up to 24 hours for someone in the office to contact you about your call or message. Be mindful your provider may be out of the office or your message may require further review. We encourage you to use [employer] for your messages as this is a faster, more efficient way to communicate with our office                         Medication Refills:            Prescription medications require 48-72 business hours to process. We encourage you to use [employer] for your refills. For controlled medications: Please allow 72 business hours to process. Certain medications may require you to  a written prescription at our office. NO narcotic/controlled medications will be prescribed after 4pm Monday through Friday or on weekends              Form/Paperwork Completion:            Please note a $25 fee may incur for all paperwork for completed by our providers. We ask that you allow 7-10 business days. Pre-payment is due prior to picking up/faxing the completed form. You may also download your forms to [employer] to have your doctor print off.

## 2019-06-06 NOTE — PROGRESS NOTES
SUBJECTIVE:   Mr. Ovidio Martinez is a 80 y.o. W male who is here for CPE, also an acute complaint; also follow up of routine medical issues. Chief Complaint   Patient presents with    Diabetes     pt here today for 4 month f.u     Chronic back pain: \"Getting worse\", radiating especially down R leg. At night, \"pulsing pain in both legs. \"   It is recalled that he underwent C-spine fusion in 2012, with Dr. Chas Leija. Still, he gets tingling and throbbing at times, particularly after long days, or activity. 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 He hasn't yet addressed the snoring. Dr. Holley has treated him for bladder cancer, and administered BCG. Memory: Tested by Dr. Prabhakar Llanos in 2014, and no dementia. We noted before: He would like to have his memory checked. He has been a bit forgetful at times--noticing issues with short term recall, losing objects, forgetting the name of a coworker, etc.  His wife is concerned. At this time, he is otherwise doing well and has brought no other complaints to my attention today. For a list of the medical issues addressed today, see the assessment and plan below. PMH: His ophthalmologist is Sumi Dumont  Past Medical History:   Diagnosis Date    Adopted     Arthritis     Went to see Dr. Alex Mclain 2013, and was told he has arthritis in the hands and shoulder.  Back pain     Bladder cancer ([address]     procedure6/2018    DM (diabetes mellitus) ([address] 10/18/2012    Hypertension     Hypertriglyceridemia 8/19/2014    Liver disease     hx hepatitis C 9 yrs.  ago- treated    Malignant neoplasm of urinary bladder ([address] 9/13/2018    Shingles     Unspecified adverse effect of anesthesia     BLOOD PRESSURE WENT EXTREMILY HIGH DURING ANESTHESIA for lt rotator cuff 2009       PSH:   Past Surgical History:   Procedure Laterality Date    HX CERVICAL FUSION  2012    C3-C5; Dr. Nisa Britton HX ORTHOPAEDIC  2009    Left Rotator cuff repair    HX ORTHOPAEDIC  7/2011    Right Rotator cuff repair    HX POLYPECTOMY  2011    Dr. Alejandro Epps: He is allergic to codeine; metformin; oxycodone; and zostavax [zoster vaccine live (pf)]. MEDS:   Current Outpatient Medications   Medication Sig    oxyCODONE IR (ROXICODONE) 5 mg immediate release tablet Take 1 Tab by mouth every four (4) hours as needed for Pain for up to 7 days.  valsartan (DIOVAN) 160 mg tablet Take 1 Tab by mouth nightly.  ipratropium-albuterol (COMBIVENT RESPIMAT)  mcg/actuation inhaler Take 1 Puff by inhalation every six (6) hours as needed for Wheezing.  pramoxine-hydrocortisone (PROTOFOAM-HC) 2.5-1 % topical cream Apply  to affected area three (3) times daily.  PREVIDENT 5000 PLUS 1.1 % crea Use as directed    desonide (TRIDESILON) 0.05 % cream Apply  to affected area two (2) times a day.  aspirin 81 mg tablet Take 81 mg by mouth.  OTHER Fish oil bid    benzocaine (HURRICAINE) 20 % spra 2 Sprays by Mucous Membrane route as needed.  MYRBETRIQ 50 mg ER tablet     fenofibrate nanocrystallized (TRICOR) 145 mg tablet Take 1 Tab by mouth daily.  predniSONE (STERAPRED DS) 10 mg dose pack See administration instruction per 10mg dose pack    solifenacin (VESICARE) 5 mg tablet Take 5 mg by mouth daily.  cyclobenzaprine (FLEXERIL) 10 mg tablet Take 1 Tab by mouth three (3) times daily as needed for Muscle Spasm(s). No current facility-administered medications for this visit. FH: Adopted. SH: He is a . He reports that he quit smoking about 21 years ago. His smoking use included cigarettes. He has a 30.00 pack-year smoking history. He has never used smokeless tobacco. He reports that he drinks alcohol. ROS: See above; Complete ROS otherwise negative.      OBJECTIVE:   Vitals:   Visit Vitals  BP (!) 81/48 (BP 1 Location: Left arm, BP Patient Position: Sitting)   Pulse (!) 55   Temp 97.6 °F (36.4 °C) (Oral)   Resp 16   Ht 5' 9\" (1.753 m)   Wt 185 lb 3.2 oz (84 kg)   SpO2 96%   BMI 27.35 kg/m²      Gen: Pleasant 80 y.o. W male in NAD. HEENT: PERRLA. EOMI. OP moist and pink. Neck: Supple. No LAD. HEART: RRR, No M/G/R.    LUNGS: CTAB No W/R. ABDOMEN: S, NT, ND, BS+. EXTREMITIES: Warm. No C/C/E.  MUSCULOSKELETAL: Normal ROM, muscle strength 5/5 all groups. NEURO: Alert and oriented x 3. Cranial nerves grossly intact. No focal sensory or motor deficits noted. SKIN: Warm. Dry. Lab Results   Component Value Date/Time    Hemoglobin A1c 6.8 (H) 05/31/2019 09:14 AM       Lab Results   Component Value Date/Time    Cholesterol, total 150 05/31/2019 09:14 AM    HDL Cholesterol 39 (L) 05/31/2019 09:14 AM    LDL, calculated 79 05/31/2019 09:14 AM    VLDL, calculated 32 05/31/2019 09:14 AM    Triglyceride 160 (H) 05/31/2019 09:14 AM       ASSESSMENT/ PLAN:      1. Diabetes mellitus: Controlled at last check. Continue metformin bid. 2. Sciatica: Ongoing. Following with spine doctor, Dr. Shawn Segovia  3. Dyslipidemia: LDL is okay. 4. Rotator cuff (capsule) sprain: Stable. Referred to orthopedics--for now, he wishes to hold off any surgery. 5. History of Hepatitis C s/p treatment: CMP shows mild elevation of transaminases; recheck. 6. Low libido: Not discussed today. 7. Memory disturbance: Stable. No dementia per neuropsych testing. 8. Snoring: Referred prev to orthodontist.  9. Urinary frequency and incontinence: Now on vesicare. Seeing urologist.   10. Bladder cancer, s/p BCG per Dr. Karie Corea. I have reviewed the patient's medications and risks/side effects/benefits were discussed. Diagnosis(-es) explained to patient and questions answered. Literature provided where appropriate. Follow-up and Dispositions    · Return in about 4 months (around 10/6/2019) for DM.

## 2019-06-06 NOTE — PROGRESS NOTES
1. Have you been to the ER, urgent care clinic since your last visit? Hospitalized since your last visit?no  2. Have you seen or consulted any other health care providers outside of the [address] [place] [address] since your last visit? Include any pap smears or colon screening.  no

## 2019-07-09 ENCOUNTER — TELEPHONE (OUTPATIENT)
Dept: INTERNAL MEDICINE CLINIC | Age: 69
End: 2019-07-09

## 2019-07-09 NOTE — TELEPHONE ENCOUNTER
Patient states he needs a call back in reference to hand pain/knot behind ring finger in the palm of his hand. Patient states he would like to be advised who Dr. Nesha Ventura would refer him to for this particular condition. Please call to discuss & advise.  Thank you

## 2019-07-10 NOTE — TELEPHONE ENCOUNTER
Identified patient 2 identifiers verified.   Patient was given the contact number to [facility]

## 2019-08-21 ENCOUNTER — TELEPHONE (OUTPATIENT)
Dept: INTERNAL MEDICINE CLINIC | Age: 69
End: 2019-08-21

## 2019-08-21 DIAGNOSIS — M48.02 SPINAL STENOSIS IN CERVICAL REGION: ICD-10-CM

## 2019-08-21 NOTE — TELEPHONE ENCOUNTER
----- Message from Althea Merino sent at 8/20/2019  4:54 PM EDT -----  Regarding: Be Stovall MD/Refill   Pt is requesting a refill on \"Oxycodone\" 5mg. Pt's pharmacy is on file  ([facility] on [address]).  Pt's Best Contact Number: [phone]         Message copied/pasted from [employer]

## 2019-08-22 RX ORDER — OXYCODONE HYDROCHLORIDE 5 MG/1
5 TABLET ORAL
Qty: 30 TAB | Refills: 0 | Status: SHIPPED | OUTPATIENT
Start: 2019-08-22 | End: 2019-10-23 | Stop reason: SDUPTHER

## 2019-09-09 RX ORDER — VALSARTAN 160 MG/1
160 TABLET ORAL
Qty: 90 TAB | Refills: 3 | Status: SHIPPED | OUTPATIENT
Start: 2019-09-09 | End: 2020-06-15 | Stop reason: SDUPTHER

## 2019-10-23 ENCOUNTER — OFFICE VISIT (OUTPATIENT)
Dept: INTERNAL MEDICINE CLINIC | Age: 69
End: 2019-10-23

## 2019-10-23 VITALS
BODY MASS INDEX: 27.85 KG/M2 | WEIGHT: 188 LBS | HEIGHT: 69 IN | HEART RATE: 57 BPM | DIASTOLIC BLOOD PRESSURE: 65 MMHG | OXYGEN SATURATION: 95 % | TEMPERATURE: 99.3 F | SYSTOLIC BLOOD PRESSURE: 114 MMHG | RESPIRATION RATE: 16 BRPM

## 2019-10-23 DIAGNOSIS — E78.1 HYPERTRIGLYCERIDEMIA: ICD-10-CM

## 2019-10-23 DIAGNOSIS — M48.02 SPINAL STENOSIS IN CERVICAL REGION: ICD-10-CM

## 2019-10-23 DIAGNOSIS — I10 ESSENTIAL HYPERTENSION: ICD-10-CM

## 2019-10-23 DIAGNOSIS — E11.9 TYPE 2 DIABETES MELLITUS WITHOUT COMPLICATION, UNSPECIFIED WHETHER LONG TERM INSULIN USE (HCC): ICD-10-CM

## 2019-10-23 DIAGNOSIS — Z13.6 SCREENING FOR AAA (ABDOMINAL AORTIC ANEURYSM): Primary | ICD-10-CM

## 2019-10-23 DIAGNOSIS — Z86.19 HISTORY OF HEPATITIS C: ICD-10-CM

## 2019-10-23 RX ORDER — ACYCLOVIR 50 MG/G
CREAM TOPICAL
Qty: 5 G | Refills: 11 | Status: SHIPPED | OUTPATIENT
Start: 2019-10-23 | End: 2022-04-15

## 2019-10-23 RX ORDER — OXYCODONE HYDROCHLORIDE 5 MG/1
5 TABLET ORAL
Qty: 30 TAB | Refills: 0 | Status: SHIPPED | OUTPATIENT
Start: 2019-10-23 | End: 2019-12-27 | Stop reason: SDUPTHER

## 2019-10-23 RX ORDER — OXYCODONE HYDROCHLORIDE 5 MG/1
5 CAPSULE ORAL
COMMUNITY
End: 2019-10-23 | Stop reason: SDUPTHER

## 2019-10-23 RX ORDER — ATORVASTATIN CALCIUM 10 MG/1
10 TABLET, FILM COATED ORAL DAILY
Qty: 90 TAB | Refills: 3 | Status: SHIPPED | OUTPATIENT
Start: 2019-10-23 | End: 2019-12-20 | Stop reason: ALTCHOICE

## 2019-10-23 NOTE — PATIENT INSTRUCTIONS
Office Policies Phone calls/patient messages: Please allow up to 24 hours for someone in the office to contact you about your call or message. Be mindful your provider may be out of the office or your message may require further review. We encourage you to use [employer] for your messages as this is a faster, more efficient way to communicate with our office Medication Refills: 
         
Prescription medications require 48-72 business hours to process. We encourage you to use [employer] for your refills. For controlled medications: Please allow 72 business hours to process. Certain medications may require you to  a written prescription at our office. NO narcotic/controlled medications will be prescribed after 4pm Monday through Friday or on weekends Form/Paperwork Completion: 
         
Please note a $25 fee may incur for all paperwork for completed by our providers. We ask that you allow 7-10 business days. Pre-payment is due prior to picking up/faxing the completed form. You may also download your forms to [employer] to have your doctor print off.

## 2019-10-23 NOTE — PROGRESS NOTES
SUBJECTIVE:   Mr. Liane Iverson is a 80 y.o. W male who is here for CPE; also follow up of routine medical issues. Chief Complaint   Patient presents with    Hypertension     pt here today for a routine f/u & to get [facility]osit[facility] physician reults form completed    Medication Evaluation     pt wants to discuss oxycodone    Dizziness     pt states that at times he get dizzy       Chronic back pain: He had a period when it was quite severe. This has eased off. Pain generally is radiating especially down R leg. It is recalled that he underwent C-spine fusion in 2012, with Dr. Spring Pan. Still, he gets tingling and throbbing at times, particularly after long days, or activity. 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 He hasn't yet addressed the snoring. Dr. Alma Ryan has treated him for bladder cancer, and administered BCG. Memory: Tested by Dr. Familia Dickson in 2014, and no dementia. We noted before: He would like to have his memory checked. He has been a bit forgetful at times--noticing issues with short term recall, losing objects, forgetting the name of a coworker, etc.  His wife is concerned. At this time, he is otherwise doing well and has brought no other complaints to my attention today. For a list of the medical issues addressed today, see the assessment and plan below. PMH: His ophthalmologist is Izabela Jacob  Past Medical History:   Diagnosis Date    Adopted     Arthritis     Went to see Dr. Miguel Angel Shell 2013, and was told he has arthritis in the hands and shoulder.  Back pain     Bladder cancer ([address]     procedure6/2018    DM (diabetes mellitus) ([address] 10/18/2012    Hypertension     Hypertriglyceridemia 8/19/2014    Liver disease     hx hepatitis C 9 yrs.  ago- treated    Malignant neoplasm of urinary bladder ([address] 9/13/2018    Shingles     Unspecified adverse effect of anesthesia     BLOOD PRESSURE WENT EXTREMILY HIGH DURING ANESTHESIA for lt rotator cuff 2009       PSH:   Past Surgical History:   Procedure Laterality Date    HX CERVICAL FUSION  2012    C3-C5; Dr. Love Marques HX ORTHOPAEDIC  2009    Left Rotator cuff repair    HX ORTHOPAEDIC  7/2011    Right Rotator cuff repair    HX POLYPECTOMY  2011    Dr. Sandee Schwarz: He is allergic to codeine; metformin; and zostavax [zoster vaccine live (pf)]. MEDS:   Current Outpatient Medications   Medication Sig    oxyCODONE (OXYIR) 5 mg capsule Take 5 mg by mouth every four (4) hours as needed.  valsartan (DIOVAN) 160 mg tablet Take 1 Tab by mouth nightly.  ipratropium-albuterol (COMBIVENT RESPIMAT)  mcg/actuation inhaler Take 1 Puff by inhalation every six (6) hours as needed for Wheezing.  pramoxine-hydrocortisone (PROTOFOAM-HC) 2.5-1 % topical cream Apply  to affected area three (3) times daily.  PREVIDENT 5000 PLUS 1.1 % crea Use as directed    desonide (TRIDESILON) 0.05 % cream Apply  to affected area two (2) times a day.  aspirin 81 mg tablet Take 81 mg by mouth. No current facility-administered medications for this visit. FH: Adopted. SH: He is a . He reports that he quit smoking about 22 years ago. His smoking use included cigarettes. He has a 30.00 pack-year smoking history. He has never used smokeless tobacco. He reports that he drinks alcohol. ROS: See above; Complete ROS otherwise negative. OBJECTIVE:   Vitals:   Visit Vitals  /65 (BP 1 Location: Left arm, BP Patient Position: Sitting)   Pulse (!) 57   Temp 99.3 °F (37.4 °C) (Oral)   Resp 16   Ht 5' 9\" (1.753 m)   Wt 188 lb (85.3 kg)   SpO2 95%   BMI 27.76 kg/m²      Gen: Pleasant 80 y.o. W male in NAD. HEENT: PERRLA. EOMI. OP moist and pink. Neck: Supple. No LAD. HEART: RRR, No M/G/R.    LUNGS: CTAB No W/R. ABDOMEN: S, NT, ND, BS+. EXTREMITIES: Warm.  No C/C/E.  MUSCULOSKELETAL: Normal ROM, muscle strength 5/5 all groups. NEURO: Alert and oriented x 3. Cranial nerves grossly intact. No focal sensory or motor deficits noted. SKIN: Warm. Dry. Lab Results   Component Value Date/Time    Hemoglobin A1c 6.8 (H) 05/31/2019 09:14 AM       Lab Results   Component Value Date/Time    Cholesterol, total 150 05/31/2019 09:14 AM    HDL Cholesterol 39 (L) 05/31/2019 09:14 AM    LDL, calculated 79 05/31/2019 09:14 AM    VLDL, calculated 32 05/31/2019 09:14 AM    Triglyceride 160 (H) 05/31/2019 09:14 AM       ASSESSMENT/ PLAN:    CPE: Normal exam.     1. Diabetes mellitus: Controlled at last check. Continue metformin bid. 2. Sciatica: Ongoing. Following with spine doctor, Dr. Hanh Mckeon  3. Dyslipidemia: Labs pending. Discussed statin and low dose aspirin for cardiac protection. DM confers high CV risk. 4. History of Hepatitis C s/p treatment: CMP shows mild elevation of transaminases; recheck. 5. Low libido: Not discussed today. 6. Memory disturbance: Stable. No dementia per neuropsych testing. 7. Snoring: Referred prev to orthodontist.  8. Urinary frequency and incontinence: Now on vesicare. Seeing urologist.   9. Bladder cancer, s/p BCG per Dr. Da Thakkar. I have reviewed the patient's medications and risks/side effects/benefits were discussed. Diagnosis(-es) explained to patient and questions answered. Literature provided where appropriate. Follow-up and Dispositions    · Return in about 4 months (around 2/23/2020) for DM.

## 2019-10-23 NOTE — PROGRESS NOTES
1. Have you been to the ER, urgent care clinic since your last visit? Hospitalized since your last visit?no    2. Have you seen or consulted any other health care providers outside of the [address] [place] [address] since your last visit? Include any pap smears or colon screening.  no

## 2019-10-24 LAB
ALBUMIN/CREAT UR: 5.9 MG/G CREAT (ref 0–30)
CREAT UR-MCNC: 207.2 MG/DL
MICROALBUMIN UR-MCNC: 12.3 UG/ML

## 2019-10-25 ENCOUNTER — TELEPHONE (OUTPATIENT)
Dept: INTERNAL MEDICINE CLINIC | Age: 69
End: 2019-10-25

## 2019-10-25 LAB
ALBUMIN SERPL-MCNC: 4.8 G/DL (ref 3.6–4.8)
ALBUMIN/GLOB SERPL: 2 {RATIO} (ref 1.2–2.2)
ALP SERPL-CCNC: 54 IU/L (ref 39–117)
ALT SERPL-CCNC: 98 IU/L (ref 0–44)
AST SERPL-CCNC: 63 IU/L (ref 0–40)
BASOPHILS # BLD AUTO: 0.1 X10E3/UL (ref 0–0.2)
BASOPHILS NFR BLD AUTO: 1 %
BILIRUB SERPL-MCNC: 0.4 MG/DL (ref 0–1.2)
BUN SERPL-MCNC: 13 MG/DL (ref 8–27)
BUN/CREAT SERPL: 16 (ref 10–24)
CALCIUM SERPL-MCNC: 9.1 MG/DL (ref 8.6–10.2)
CHLORIDE SERPL-SCNC: 105 MMOL/L (ref 96–106)
CHOLEST SERPL-MCNC: 169 MG/DL (ref 100–199)
CO2 SERPL-SCNC: 21 MMOL/L (ref 20–29)
CREAT SERPL-MCNC: 0.83 MG/DL (ref 0.76–1.27)
EOSINOPHIL # BLD AUTO: 0.1 X10E3/UL (ref 0–0.4)
EOSINOPHIL NFR BLD AUTO: 3 %
ERYTHROCYTE [DISTWIDTH] IN BLOOD BY AUTOMATED COUNT: 13.4 % (ref 12.3–15.4)
EST. AVERAGE GLUCOSE BLD GHB EST-MCNC: 157 MG/DL
GLOBULIN SER CALC-MCNC: 2.4 G/DL (ref 1.5–4.5)
GLUCOSE SERPL-MCNC: 137 MG/DL (ref 65–99)
HBA1C MFR BLD: 7.1 % (ref 4.8–5.6)
HCT VFR BLD AUTO: 45.3 % (ref 37.5–51)
HCV GENOTYPE: NORMAL
HCV RNA SERPL NAA+PROBE-ACNC: NORMAL IU/ML
HCV RNA SERPL NAA+PROBE-LOG IU: NORMAL LOG10 IU/ML
HDLC SERPL-MCNC: 41 MG/DL
HGB BLD-MCNC: 15.9 G/DL (ref 13–17.7)
IMM GRANULOCYTES # BLD AUTO: 0 X10E3/UL (ref 0–0.1)
IMM GRANULOCYTES NFR BLD AUTO: 0 %
LDLC SERPL CALC-MCNC: 83 MG/DL (ref 0–99)
LYMPHOCYTES # BLD AUTO: 2 X10E3/UL (ref 0.7–3.1)
LYMPHOCYTES NFR BLD AUTO: 37 %
MCH RBC QN AUTO: 31.8 PG (ref 26.6–33)
MCHC RBC AUTO-ENTMCNC: 35.1 G/DL (ref 31.5–35.7)
MCV RBC AUTO: 91 FL (ref 79–97)
MONOCYTES # BLD AUTO: 0.5 X10E3/UL (ref 0.1–0.9)
MONOCYTES NFR BLD AUTO: 9 %
NEUTROPHILS # BLD AUTO: 2.9 X10E3/UL (ref 1.4–7)
NEUTROPHILS NFR BLD AUTO: 50 %
PLATELET # BLD AUTO: 157 X10E3/UL (ref 150–450)
POTASSIUM SERPL-SCNC: 4.4 MMOL/L (ref 3.5–5.2)
PROT SERPL-MCNC: 7.2 G/DL (ref 6–8.5)
RBC # BLD AUTO: 5 X10E6/UL (ref 4.14–5.8)
SODIUM SERPL-SCNC: 142 MMOL/L (ref 134–144)
TEST INFORMATION: NORMAL
TRIGL SERPL-MCNC: 227 MG/DL (ref 0–149)
VLDLC SERPL CALC-MCNC: 45 MG/DL (ref 5–40)
WBC # BLD AUTO: 5.5 X10E3/UL (ref 3.4–10.8)

## 2019-10-25 NOTE — TELEPHONE ENCOUNTER
Pt was seen in office on 10/23/19 by Dr Lucian Roberto. Pt brought in a [facility] diagnostic form to be completed by Dr Lucian Roberto. Form has been completed and faxed to [phone], approved fax confirmation received. Copy of form will be mailed to pt.

## 2019-10-31 ENCOUNTER — HOSPITAL ENCOUNTER (OUTPATIENT)
Dept: ULTRASOUND IMAGING | Age: 69
Discharge: HOME | End: 2019-10-31
Attending: INTERNAL MEDICINE
Payer: COMMERCIAL

## 2019-10-31 DIAGNOSIS — Z13.6 SCREENING FOR AAA (ABDOMINAL AORTIC ANEURYSM): ICD-10-CM

## 2019-10-31 PROCEDURE — 76706 US ABDL AORTA SCREEN AAA: CPT

## 2019-11-01 ENCOUNTER — TELEPHONE (OUTPATIENT)
Dept: INTERNAL MEDICINE CLINIC | Age: 69
End: 2019-11-01

## 2019-11-01 NOTE — PROGRESS NOTES
Please call the patient and let the patient know that his test result(s) is/are normal.  Thanks. Junior Knox.

## 2019-11-01 NOTE — TELEPHONE ENCOUNTER
----- Message from Patricia Leija MD sent at 11/1/2019  2:32 PM EDT -----  Please call the patient and let the patient know that his test result(s) is/are normal.  Thanks. Samantha Lind.

## 2019-11-29 ENCOUNTER — TELEPHONE (OUTPATIENT)
Dept: INTERNAL MEDICINE CLINIC | Age: 69
End: 2019-11-29

## 2019-11-29 NOTE — TELEPHONE ENCOUNTER
Received triage call from pt. Pt c/o lack of sleep and loss of memory. Pt thinks that all of his symptoms are caused by his newly prescribed medication, lipitor. 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 Pt also stated he is sleeping less than 4 hours a day. Pt stated that as of today, he has stopped taking lipitor. Notified pt I would send message to Dr. Tara Rodriguez. Recommended pt try OTC melatonin to help with sleep. Pt verbalized understanding of information discussed w/ no further questions at this time.

## 2019-12-20 ENCOUNTER — OFFICE VISIT (OUTPATIENT)
Dept: CARDIOLOGY CLINIC | Age: 69
End: 2019-12-20

## 2019-12-20 VITALS
DIASTOLIC BLOOD PRESSURE: 70 MMHG | WEIGHT: 189 LBS | RESPIRATION RATE: 16 BRPM | OXYGEN SATURATION: 95 % | BODY MASS INDEX: 27.99 KG/M2 | SYSTOLIC BLOOD PRESSURE: 110 MMHG | HEIGHT: 69 IN | HEART RATE: 75 BPM

## 2019-12-20 DIAGNOSIS — E11.9 TYPE 2 DIABETES MELLITUS WITHOUT COMPLICATION, UNSPECIFIED WHETHER LONG TERM INSULIN USE (HCC): ICD-10-CM

## 2019-12-20 DIAGNOSIS — I10 ESSENTIAL HYPERTENSION: ICD-10-CM

## 2019-12-20 DIAGNOSIS — E78.1 HYPERTRIGLYCERIDEMIA: ICD-10-CM

## 2019-12-20 DIAGNOSIS — R42 DIZZINESS: Primary | ICD-10-CM

## 2019-12-20 RX ORDER — OXYCODONE HYDROCHLORIDE 15 MG/1
15 TABLET ORAL
COMMUNITY
Start: 2018-12-03 | End: 2019-12-27 | Stop reason: SDUPTHER

## 2019-12-20 RX ORDER — ROSUVASTATIN CALCIUM 5 MG/1
5 TABLET, COATED ORAL DAILY
Qty: 90 TAB | Refills: 3 | Status: SHIPPED | OUTPATIENT
Start: 2019-12-20 | End: 2020-03-20 | Stop reason: SDUPTHER

## 2019-12-20 RX ORDER — KETOCONAZOLE 20 MG/ML
SHAMPOO TOPICAL AS NEEDED
Refills: 11 | COMMUNITY
Start: 2019-10-29 | End: 2022-04-15

## 2019-12-20 RX ORDER — MELATONIN
1000 DAILY
COMMUNITY
End: 2021-06-23

## 2019-12-20 NOTE — LETTER
12/20/19 Patient: [place] YOB: 1950 Date of Visit: 12/20/2019 Towana Angelucci, MD 
[address] [address] VIA In Basket Dear Towana Angelucci, MD, Thank you for referring Mr. Be Billy to [address] [place] [address] for evaluation. My notes for this consultation are attached. If you have questions, please do not hesitate to call me. I look forward to following your patient along with you.  
 
 
Sincerely, 
 
Lucio Castellano MD

## 2019-12-20 NOTE — PROGRESS NOTES
1. Have you been to the ER, urgent care clinic since your last visit? Hospitalized since your last visit? No.    2. Have you seen or consulted any other health care providers outside of the [address] [place] [address] since your last visit? Include any pap smears or colon screening.    No.      Chief Complaint   Patient presents with    New Patient     last seen 2015- dizziness- wants to discuss statin medication

## 2019-12-20 NOTE — PROGRESS NOTES
[facility] [address] DNP, ANP-BC  Subjective/HPI:     Brice Hernandez is a 80 y.o. male is here for long follow-up initially seen in 2015 for intermittent dizziness. Patient reports the dizziness had resolved with surgical intervention of C-spine stenosis. He is here to discuss today aspirin and statin therapy as well as intermittent dizziness again. Patient reports his dizziness is only triggered by lateral head movements most notable at work in  industry. He denies any blurred vision associated lightheadedness syncope or presyncopal feelings nor any association with fluttering or palpitations. Due to male, age, A1c 7.1% hypertension as well as unknown family history of heart disease patient was started on Lipitor 10 mg by primary care. Patient expressed concern over having memory loss and malaise and has since discontinued it. Jerald Jones PCP Provider  Priya Aden MD  Past Medical History:   Diagnosis Date    Adopted     Arthritis     Went to see Dr. Sunshine Correa 2013, and was told he has arthritis in the hands and shoulder.  Back pain     Bladder cancer ([address]     procedure6/2018    DM (diabetes mellitus) ([address] 10/18/2012    Hypertension     Hypertriglyceridemia 8/19/2014    Liver disease     hx hepatitis C 9 yrs.  ago- treated    Malignant neoplasm of urinary bladder ([address] 9/13/2018    Shingles     Unspecified adverse effect of anesthesia     BLOOD PRESSURE WENT EXTREMILY HIGH DURING ANESTHESIA for lt rotator cuff 2009      Past Surgical History:   Procedure Laterality Date    HX CERVICAL FUSION  2012    C3-C5; Dr. Saskia Donohue HX ORTHOPAEDIC  2009    Left Rotator cuff repair    HX ORTHOPAEDIC  7/2011    Right Rotator cuff repair    HX POLYPECTOMY  2011    Dr. Oneil Human       Allergies   Allergen Reactions    Codeine Other (comments)     RXTREME STOMACH PAIN    Metformin Other (comments)     Pain    Zostavax [Zoster Vaccine Live (Pf)] Rash      History reviewed. No pertinent family history. Current Outpatient Medications   Medication Sig    cholecalciferol (VITAMIN D3) (1000 Units /25 mcg) tablet Take 1,000 Units by mouth daily.  ketoconazole (NIZORAL) 2 % shampoo Apply  to affected area as needed.  oxyCODONE IR (OXY-IR) 15 mg immediate release tablet Take 15 mg by mouth every eight (8) hours as needed.  rosuvastatin (CRESTOR) 5 mg tablet Take 1 Tab by mouth daily. Replaces lipitor on 12/20/19    acyclovir (ZOVIRAX) 5 % topical cream Apply  to affected area five (5) times daily. (Patient taking differently: Apply  to affected area as needed.)    valsartan (DIOVAN) 160 mg tablet Take 1 Tab by mouth nightly.  pramoxine-hydrocortisone (PROTOFOAM-HC) 2.5-1 % topical cream Apply  to affected area three (3) times daily.  PREVIDENT 5000 PLUS 1.1 % crea Use as directed    desonide (TRIDESILON) 0.05 % cream Apply  to affected area two (2) times a day.  aspirin 81 mg tablet Take 81 mg by mouth daily.  ipratropium-albuterol (COMBIVENT RESPIMAT)  mcg/actuation inhaler Take 1 Puff by inhalation every six (6) hours as needed for Wheezing. No current facility-administered medications for this visit.        Vitals:    12/20/19 1327 12/20/19 1333 12/20/19 1334 12/20/19 1335   BP: 114/72 106/68 118/72 110/70   Pulse:       Resp:       SpO2:       Weight:       Height:         Social History     Socioeconomic History    Marital status:      Spouse name: Not on file    Number of children: Not on file    Years of education: Not on file    Highest education level: Not on file   Occupational History    Not on file   Social Needs    Financial resource strain: Not on file    Food insecurity:     Worry: Not on file     Inability: Not on file    Transportation needs:     Medical: Not on file     Non-medical: Not on file   Tobacco Use    Smoking status: Former Smoker     Packs/day: 1.00     Years: 30.00     Pack years: 30.00     Types: Cigarettes     Last attempt to quit: 1997     Years since quittin.4    Smokeless tobacco: Never Used   Substance and Sexual Activity    Alcohol use: Yes     Alcohol/week: 0.0 standard drinks     Comment: occasional    Drug use: Never    Sexual activity: Not on file   Lifestyle    Physical activity:     Days per week: Not on file     Minutes per session: Not on file    Stress: Not on file   Relationships    Social connections:     Talks on phone: Not on file     Gets together: Not on file     Attends Rastafari service: Not on file     Active member of club or organization: Not on file     Attends meetings of clubs or organizations: Not on file     Relationship status: Not on file    Intimate partner violence:     Fear of current or ex partner: Not on file     Emotionally abused: Not on file     Physically abused: Not on file     Forced sexual activity: Not on file   Other Topics Concern    Not on file   Social History Narrative    Not on file       I have reviewed the nurses notes, vitals, problem list, allergy list, medical history, family, social history and medications. Review of Symptoms  11 systems reviewed, negative other than as stated in the HPI      Physical Exam:      General: Well developed, in no acute distress, cooperative and alert  HEENT: No carotid bruits, no JVD, trach is midline. Neck Supple, PERRL, EOM intact. Both ear canals are patent,   heart:  Normal S1/S2 negative S3 or S4. Regular, no murmur, gallop or rub. Respiratory: Clear bilaterally x 4, no wheezing or rales  Abdomen:   Soft, non-tender, no masses, bowel sounds are active. Extremities:  No edema, normal cap refill, no cyanosis, atraumatic. Neuro: A&Ox3, speech clear, gait stable. Skin: Skin color is normal. No rashes or lesions.  Non diaphoretic  Vascular: 2+ pulses symmetric in all extremities    Cardiographics    ECG: Normal sinus rhythm        Cardiology Labs:  Lab Results   Component Value Date/Time    Cholesterol, total 169 10/23/2019 11:24 AM    HDL Cholesterol 41 10/23/2019 11:24 AM    LDL, calculated 83 10/23/2019 11:24 AM    Triglyceride 227 (H) 10/23/2019 11:24 AM       Lab Results   Component Value Date/Time    Sodium 142 10/23/2019 11:24 AM    Potassium 4.4 10/23/2019 11:24 AM    Chloride 105 10/23/2019 11:24 AM    CO2 21 10/23/2019 11:24 AM    Anion gap 10 10/12/2012 08:45 AM    Glucose 137 (H) 10/23/2019 11:24 AM    BUN 13 10/23/2019 11:24 AM    Creatinine 0.83 10/23/2019 11:24 AM    BUN/Creatinine ratio 16 10/23/2019 11:24 AM    GFR est  10/23/2019 11:24 AM    GFR est non-AA 91 10/23/2019 11:24 AM    Calcium 9.1 10/23/2019 11:24 AM    Bilirubin, total 0.4 10/23/2019 11:24 AM    AST (SGOT) 63 (H) 10/23/2019 11:24 AM    Alk. phosphatase 54 10/23/2019 11:24 AM    Protein, total 7.2 10/23/2019 11:24 AM    Albumin 4.8 10/23/2019 11:24 AM    Globulin 3.2 10/12/2012 08:45 AM    A-G Ratio 2.0 10/23/2019 11:24 AM    ALT (SGPT) 98 (H) 10/23/2019 11:24 AM           Assessment:     Assessment:     Diagnoses and all orders for this visit:    1. Dizziness  -     DUPLEX CAROTID BILATERAL; Future    2. Essential hypertension  -     AMB POC EKG ROUTINE W/ 12 LEADS, INTER & REP  -     DUPLEX CAROTID BILATERAL; Future    3. Hypertriglyceridemia  -     AMB POC EKG ROUTINE W/ 12 LEADS, INTER & REP  -     DUPLEX CAROTID BILATERAL; Future    4. Type 2 diabetes mellitus without complication, unspecified whether long term insulin use ([address]  -     DUPLEX CAROTID BILATERAL; Future    Other orders  -     rosuvastatin (CRESTOR) 5 mg tablet; Take 1 Tab by mouth daily. Replaces lipitor on 12/20/19        ICD-10-CM ICD-9-CM    1. Dizziness R42 780.4 DUPLEX CAROTID BILATERAL   2. Essential hypertension I10 401.9 AMB POC EKG ROUTINE W/ 12 LEADS, INTER & REP      DUPLEX CAROTID BILATERAL   3.  Hypertriglyceridemia E78.1 272.1 AMB POC EKG ROUTINE W/ 12 LEADS, INTER & REP      DUPLEX CAROTID BILATERAL   4. Type 2 diabetes mellitus without complication, unspecified whether long term insulin use (HCC) E11.9 250.00 DUPLEX CAROTID BILATERAL     Orders Placed This Encounter    AMB POC EKG ROUTINE W/ 12 LEADS, INTER & REP     Order Specific Question:   Reason for Exam:     Answer:   routine    cholecalciferol (VITAMIN D3) (1000 Units /25 mcg) tablet     Sig: Take 1,000 Units by mouth daily.  ketoconazole (NIZORAL) 2 % shampoo     Sig: Apply  to affected area as needed. Refill:  11    oxyCODONE IR (OXY-IR) 15 mg immediate release tablet     Sig: Take 15 mg by mouth every eight (8) hours as needed.  rosuvastatin (CRESTOR) 5 mg tablet     Sig: Take 1 Tab by mouth daily. Replaces lipitor on 12/20/19     Dispense:  90 Tab     Refill:  3        Plan:     1. Dizziness triggered by lateral head motion: Suspect vestibular condition and not arrhythmia. Given risk factors will evaluate for carotid stenosis with duplex. Provided the phone number for Dr. Rey Blevins of ENT for further vestibular evaluation. 2.  Hypertension: Controlled 110/70 continue current medications  3. Risk factor discussion regarding statin therapy: Recommend statin therapy can try switching to Crestor 5 mg in lieu of Lipitor to see if better tolerated. Will prescribe with labs in 3 months. He will call if significant side effects. Advised on the fact that statins are class I by AHA guidelines in a diabetic and why.  4.  Counseled on diet and exercise- eventual goal of 30-60 minutes 5-7 times a week as per AHA guidelines. He just joined a gym. Follow up in 1 year, sooner as needed. Roula Slater MD      Please note that this dictation was completed with [employer], the [employer] voice recognition software. Quite often unanticipated grammatical, syntax, homophones, and other interpretive errors are inadvertently transcribed by the computer software. Please disregard these errors. Please excuse any errors that have escaped final proofreading. Thank you.

## 2019-12-27 DIAGNOSIS — M48.02 SPINAL STENOSIS IN CERVICAL REGION: ICD-10-CM

## 2019-12-27 RX ORDER — OXYCODONE HYDROCHLORIDE 15 MG/1
15 TABLET ORAL
OUTPATIENT
Start: 2019-12-27

## 2019-12-27 RX ORDER — OXYCODONE HYDROCHLORIDE 5 MG/1
5 TABLET ORAL
Qty: 30 TAB | Refills: 0 | Status: SHIPPED | OUTPATIENT
Start: 2019-12-27 | End: 2020-01-03

## 2019-12-27 NOTE — TELEPHONE ENCOUNTER
PCP: April Gilmore MD    Last appt: 10/23/2019  Future Appointments   Date Time Provider Sobia Cullen   2/27/2020  9:00 AM April Gilmore MD [facility]       Requested Prescriptions     Pending Prescriptions Disp Refills    oxyCODONE IR (OXY-IR) 15 mg immediate release tablet       Sig: Take 1 Tab by mouth every eight (8) hours as needed. Max Daily Amount: 45 mg.

## 2019-12-31 ENCOUNTER — TELEPHONE (OUTPATIENT)
Dept: INTERNAL MEDICINE CLINIC | Age: 69
End: 2019-12-31

## 2019-12-31 NOTE — TELEPHONE ENCOUNTER
Messages were left for patient  And his wife Jyothi DasPHILIP about patient's prescription that was ready to be picked up.

## 2020-01-03 ENCOUNTER — TELEPHONE (OUTPATIENT)
Dept: CARDIOLOGY CLINIC | Age: 70
End: 2020-01-03

## 2020-01-03 NOTE — TELEPHONE ENCOUNTER
----- Message from Talha Streeter NP sent at 1/2/2020  4:22 PM EST -----  Wendy,    Please call the patient and inform that carotid ultrasound is normal.    Thanks,  [employer]

## 2020-02-07 ENCOUNTER — TELEPHONE (OUTPATIENT)
Dept: INTERNAL MEDICINE CLINIC | Age: 70
End: 2020-02-07

## 2020-02-07 DIAGNOSIS — M54.40 LOW BACK PAIN WITH SCIATICA, SCIATICA LATERALITY UNSPECIFIED, UNSPECIFIED BACK PAIN LATERALITY, UNSPECIFIED CHRONICITY: Primary | ICD-10-CM

## 2020-02-07 NOTE — TELEPHONE ENCOUNTER
Patient states he needs a call back to see if Dr. Merle José could do Order for patient to receive Physical Therapy at [facility] due to his Back pain. Patient states he can't get in with Back Specialist any time soon & would like to try to get back into Therapy next week. Please call to discuss.  Thank you

## 2020-02-07 NOTE — TELEPHONE ENCOUNTER
Patient called and would like a referral requisition back to [facility] because his back has been bothering him.

## 2020-02-11 ENCOUNTER — TELEPHONE (OUTPATIENT)
Dept: INTERNAL MEDICINE CLINIC | Age: 70
End: 2020-02-11

## 2020-02-11 NOTE — TELEPHONE ENCOUNTER
----- Message from Antonieta Lamas sent at 2/11/2020  3:33 PM EST -----  Regarding: /Telephone  Patient return call    Caller's first and last name and relationship (if not the patient):      Best contact number(s):  [phone]    Whose call is being returned:  Ila     Details to clarify the request:  In regards to a referral     Antonieta Lamas

## 2020-02-20 DIAGNOSIS — M54.40 LOW BACK PAIN WITH SCIATICA, SCIATICA LATERALITY UNSPECIFIED, UNSPECIFIED BACK PAIN LATERALITY, UNSPECIFIED CHRONICITY: Primary | ICD-10-CM

## 2020-02-20 DIAGNOSIS — M48.02 SPINAL STENOSIS IN CERVICAL REGION: ICD-10-CM

## 2020-02-20 NOTE — TELEPHONE ENCOUNTER
Pt is requesting refill on Oxycodone IR 5 mg    This is not on the med list.    Please call pt when ready for  #[phone]

## 2020-02-20 NOTE — TELEPHONE ENCOUNTER
PCP: Stark Schlatter, MD    Last appt: 10/23/2019  Future Appointments   Date Time Provider Sobia Cullen   2/27/2020  9:00 AM Stark Schlatter, MD [facility]       Requested Prescriptions     Pending Prescriptions Disp Refills    oxyCODONE IR (ROXICODONE) 5 mg immediate release tablet 30 Tab 0     Sig: Take 1 Tab by mouth every four (4) hours as needed for Pain for up to 7 days. Max Daily Amount: 30 mg.

## 2020-02-21 RX ORDER — OXYCODONE HYDROCHLORIDE 5 MG/1
5 TABLET ORAL
Qty: 30 TAB | Refills: 0 | Status: SHIPPED | OUTPATIENT
Start: 2020-02-21 | End: 2020-04-10 | Stop reason: SDUPTHER

## 2020-02-27 ENCOUNTER — OFFICE VISIT (OUTPATIENT)
Dept: INTERNAL MEDICINE CLINIC | Age: 70
End: 2020-02-27

## 2020-02-27 VITALS
HEIGHT: 69 IN | DIASTOLIC BLOOD PRESSURE: 67 MMHG | HEART RATE: 57 BPM | BODY MASS INDEX: 27.91 KG/M2 | TEMPERATURE: 97.5 F | RESPIRATION RATE: 20 BRPM | OXYGEN SATURATION: 96 % | SYSTOLIC BLOOD PRESSURE: 113 MMHG | WEIGHT: 188.4 LBS

## 2020-02-27 DIAGNOSIS — C67.9 MALIGNANT NEOPLASM OF URINARY BLADDER, UNSPECIFIED SITE (HCC): ICD-10-CM

## 2020-02-27 DIAGNOSIS — I10 ESSENTIAL HYPERTENSION: Primary | ICD-10-CM

## 2020-02-27 DIAGNOSIS — E78.1 HYPERTRIGLYCERIDEMIA: ICD-10-CM

## 2020-02-27 DIAGNOSIS — E11.9 TYPE 2 DIABETES MELLITUS WITHOUT COMPLICATION, UNSPECIFIED WHETHER LONG TERM INSULIN USE (HCC): ICD-10-CM

## 2020-02-27 NOTE — PATIENT INSTRUCTIONS
Office Policies    Phone calls/patient messages:            Please allow up to 24 hours for someone in the office to contact you about your call or message. Be mindful your provider may be out of the office or your message may require further review. We encourage you to use [facility] for your messages as this is a faster, more efficient way to communicate with our office                         Medication Refills:            Prescription medications require 48-72 business hours to process. We encourage you to use [facility] for your refills. For controlled medications: Please allow 72 business hours to process. Certain medications may require you to  a written prescription at our office. NO narcotic/controlled medications will be prescribed after 4pm Monday through Friday or on weekends              Form/Paperwork Completion:            Please note a $25 fee may incur for all paperwork for completed by our providers. We ask that you allow 7-10 business days. Pre-payment is due prior to picking up/faxing the completed form. You may also download your forms to [facility] to have your doctor print off.      1. Have you been to the ER, urgent care clinic since your last visit? Hospitalized since your last visit?no    2. Have you seen or consulted any other health care providers outside of the [address] [place] [address] since your last visit? Include any pap smears or colon screening.  no

## 2020-02-27 NOTE — PROGRESS NOTES
SUBJECTIVE:   Mr. Patricia Youssef is a 77 y.o. W male who is here for CPE; also follow up of routine medical issues. Chief Complaint   Patient presents with    Diabetes     pt here today for 4 month f/u     He was painting and \"hurt my neck. \"  It got so bad he went to see Dr. Ranjit Todd, and was sent to PT. Last week he did dry needling and \"it made a big difference. \"  Pain still severe--\"I could hardly get up this morning. \"   Chronic back pain: Pain generally is radiating especially down R leg. It is recalled that he underwent C-spine fusion in 2012, with Dr. Ranjit Todd. Still, he gets tingling and throbbing at times, particularly after long days, or activity. 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 He hasn't yet addressed the snoring. Dr. Latesha Beckman has treated him for bladder cancer, and administered BCG. Memory: Tested by Dr. Oh Dotson in 2014, and no dementia. We noted before: He would like to have his memory checked. He has been a bit forgetful at times--noticing issues with short term recall, losing objects, forgetting the name of a coworker, etc.  His wife is concerned. At this time, he is otherwise doing well and has brought no other complaints to my attention today. For a list of the medical issues addressed today, see the assessment and plan below. PMH: His ophthalmologist is Joo Cosme  Past Medical History:   Diagnosis Date    Adopted     Arthritis     Went to see Dr. Chinedu Mazariegos 2013, and was told he has arthritis in the hands and shoulder.  Back pain     Bladder cancer ([address]     procedure6/2018    DM (diabetes mellitus) ([address] 10/18/2012    Hypertension     Hypertriglyceridemia 8/19/2014    Liver disease     hx hepatitis C 9 yrs.  ago- treated    Malignant neoplasm of urinary bladder ([address] 9/13/2018    Shingles     Unspecified adverse effect of anesthesia     BLOOD PRESSURE WENT EXTREMILY HIGH DURING ANESTHESIA for lt rotator cuff 2009       PSH: Past Surgical History:   Procedure Laterality Date    HX CERVICAL FUSION  2012    C3-C5; Dr. Keisha Asher HX ORTHOPAEDIC  2009    Left Rotator cuff repair    HX ORTHOPAEDIC  7/2011    Right Rotator cuff repair    HX POLYPECTOMY  2011    Dr. Jaspal Wade: He is allergic to codeine; metformin; and zostavax [zoster vaccine live (pf)]. MEDS:   Current Outpatient Medications   Medication Sig    oxyCODONE IR (ROXICODONE) 5 mg immediate release tablet Take 1 Tab by mouth every four (4) hours as needed for Pain for up to 7 days. Max Daily Amount: 30 mg.    cholecalciferol (VITAMIN D3) (1000 Units /25 mcg) tablet Take 1,000 Units by mouth daily.  rosuvastatin (CRESTOR) 5 mg tablet Take 1 Tab by mouth daily. Replaces lipitor on 12/20/19    acyclovir (ZOVIRAX) 5 % topical cream Apply  to affected area five (5) times daily. (Patient taking differently: Apply  to affected area as needed.)    valsartan (DIOVAN) 160 mg tablet Take 1 Tab by mouth nightly.  pramoxine-hydrocortisone (PROTOFOAM-HC) 2.5-1 % topical cream Apply  to affected area three (3) times daily.  PREVIDENT 5000 PLUS 1.1 % crea Use as directed    desonide (TRIDESILON) 0.05 % cream Apply  to affected area two (2) times a day.  aspirin 81 mg tablet Take 81 mg by mouth daily.  ketoconazole (NIZORAL) 2 % shampoo Apply  to affected area as needed.  ipratropium-albuterol (COMBIVENT RESPIMAT)  mcg/actuation inhaler Take 1 Puff by inhalation every six (6) hours as needed for Wheezing. No current facility-administered medications for this visit. FH: Adopted. SH: He is a . He reports that he quit smoking about 22 years ago. His smoking use included cigarettes. He has a 30.00 pack-year smoking history. He has never used smokeless tobacco. He reports current alcohol use. He reports that he does not use drugs. ROS: See above;  Complete ROS otherwise negative. OBJECTIVE:   Vitals:   Visit Vitals  /67 (BP 1 Location: Left arm, BP Patient Position: Sitting)   Pulse (!) 57   Temp 97.5 °F (36.4 °C) (Oral)   Resp 20   Ht 5' 9\" (1.753 m)   Wt 188 lb 6.4 oz (85.5 kg)   SpO2 96%   BMI 27.82 kg/m²      Gen: Pleasant 77 y.o. W male in NAD. HEENT: PERRLA. EOMI. OP moist and pink. Neck: Supple. No LAD. HEART: RRR, No M/G/R.    LUNGS: CTAB No W/R. ABDOMEN: S, NT, ND, BS+. EXTREMITIES: Warm. No C/C/E.  MUSCULOSKELETAL: Normal ROM, muscle strength 5/5 all groups. NEURO: Alert and oriented x 3. Cranial nerves grossly intact. No focal sensory or motor deficits noted. SKIN: Warm. Dry. Lab Results   Component Value Date/Time    Hemoglobin A1c 7.1 (H) 10/23/2019 11:24 AM       Lab Results   Component Value Date/Time    Cholesterol, total 169 10/23/2019 11:24 AM    HDL Cholesterol 41 10/23/2019 11:24 AM    LDL, calculated 83 10/23/2019 11:24 AM    VLDL, calculated 45 (H) 10/23/2019 11:24 AM    Triglyceride 227 (H) 10/23/2019 11:24 AM       ASSESSMENT/ PLAN:    1. Diabetes mellitus: Controlled at last check. Continue metformin bid. 2. Sciatica: Ongoing. Following with spine doctor, Dr. Jose Graham  3. Neck pain: PT. Follow up w/Dr. Jose Graham. 4. Dyslipidemia: Labs pending. Discussed statin and low dose aspirin for cardiac protection. DM confers high CV risk. 5. History of Hepatitis C s/p treatment: CMP shows mild elevation of transaminases; recheck. 6. Low libido: Not discussed today. 7. Memory disturbance: Stable. No dementia per neuropsych testing. 8. Snoring: Referred prev to orthodontist.  9. Urinary frequency and incontinence: Now on vesicare. Seeing urologist.   10. Bladder cancer, s/p BCG per Dr. Valentino Parra. I have reviewed the patient's medications and risks/side effects/benefits were discussed. Diagnosis(-es) explained to patient and questions answered. Literature provided where appropriate.      Follow up 4 months DM

## 2020-02-28 LAB
ALBUMIN SERPL-MCNC: 4.8 G/DL (ref 3.8–4.8)
ALBUMIN/GLOB SERPL: 1.8 {RATIO} (ref 1.2–2.2)
ALP SERPL-CCNC: 75 IU/L (ref 39–117)
ALT SERPL-CCNC: 130 IU/L (ref 0–44)
AST SERPL-CCNC: 106 IU/L (ref 0–40)
BASOPHILS # BLD AUTO: 0.1 X10E3/UL (ref 0–0.2)
BASOPHILS NFR BLD AUTO: 1 %
BILIRUB SERPL-MCNC: 0.8 MG/DL (ref 0–1.2)
BUN SERPL-MCNC: 16 MG/DL (ref 8–27)
BUN/CREAT SERPL: 18 (ref 10–24)
CALCIUM SERPL-MCNC: 9.6 MG/DL (ref 8.6–10.2)
CHLORIDE SERPL-SCNC: 101 MMOL/L (ref 96–106)
CHOLEST SERPL-MCNC: 127 MG/DL (ref 100–199)
CO2 SERPL-SCNC: 25 MMOL/L (ref 20–29)
CREAT SERPL-MCNC: 0.89 MG/DL (ref 0.76–1.27)
EOSINOPHIL # BLD AUTO: 0.2 X10E3/UL (ref 0–0.4)
EOSINOPHIL NFR BLD AUTO: 3 %
ERYTHROCYTE [DISTWIDTH] IN BLOOD BY AUTOMATED COUNT: 13.1 % (ref 11.6–15.4)
EST. AVERAGE GLUCOSE BLD GHB EST-MCNC: 177 MG/DL
GLOBULIN SER CALC-MCNC: 2.6 G/DL (ref 1.5–4.5)
GLUCOSE SERPL-MCNC: 158 MG/DL (ref 65–99)
HBA1C MFR BLD: 7.8 % (ref 4.8–5.6)
HCT VFR BLD AUTO: 49.6 % (ref 37.5–51)
HDLC SERPL-MCNC: 51 MG/DL
HGB BLD-MCNC: 17.1 G/DL (ref 13–17.7)
IMM GRANULOCYTES # BLD AUTO: 0 X10E3/UL (ref 0–0.1)
IMM GRANULOCYTES NFR BLD AUTO: 0 %
LDLC SERPL CALC-MCNC: 38 MG/DL (ref 0–99)
LYMPHOCYTES # BLD AUTO: 2.1 X10E3/UL (ref 0.7–3.1)
LYMPHOCYTES NFR BLD AUTO: 32 %
MCH RBC QN AUTO: 31.6 PG (ref 26.6–33)
MCHC RBC AUTO-ENTMCNC: 34.5 G/DL (ref 31.5–35.7)
MCV RBC AUTO: 92 FL (ref 79–97)
MONOCYTES # BLD AUTO: 0.6 X10E3/UL (ref 0.1–0.9)
MONOCYTES NFR BLD AUTO: 10 %
NEUTROPHILS # BLD AUTO: 3.7 X10E3/UL (ref 1.4–7)
NEUTROPHILS NFR BLD AUTO: 54 %
PLATELET # BLD AUTO: 180 X10E3/UL (ref 150–450)
POTASSIUM SERPL-SCNC: 4.6 MMOL/L (ref 3.5–5.2)
PROT SERPL-MCNC: 7.4 G/DL (ref 6–8.5)
RBC # BLD AUTO: 5.41 X10E6/UL (ref 4.14–5.8)
SODIUM SERPL-SCNC: 140 MMOL/L (ref 134–144)
TRIGL SERPL-MCNC: 192 MG/DL (ref 0–149)
VLDLC SERPL CALC-MCNC: 38 MG/DL (ref 5–40)
WBC # BLD AUTO: 6.7 X10E3/UL (ref 3.4–10.8)

## 2020-03-02 ENCOUNTER — TELEPHONE (OUTPATIENT)
Dept: INTERNAL MEDICINE CLINIC | Age: 70
End: 2020-03-02

## 2020-03-20 DIAGNOSIS — I10 ESSENTIAL HYPERTENSION: ICD-10-CM

## 2020-03-20 DIAGNOSIS — E78.1 HYPERTRIGLYCERIDEMIA: ICD-10-CM

## 2020-03-20 DIAGNOSIS — R42 DIZZINESS: ICD-10-CM

## 2020-03-20 DIAGNOSIS — E11.9 TYPE 2 DIABETES MELLITUS WITHOUT COMPLICATION, UNSPECIFIED WHETHER LONG TERM INSULIN USE (HCC): ICD-10-CM

## 2020-03-20 RX ORDER — ROSUVASTATIN CALCIUM 5 MG/1
5 TABLET, COATED ORAL DAILY
Qty: 90 TAB | Refills: 3 | Status: SHIPPED | OUTPATIENT
Start: 2020-03-20 | End: 2020-06-15 | Stop reason: SDUPTHER

## 2020-04-10 ENCOUNTER — VIRTUAL VISIT (OUTPATIENT)
Dept: INTERNAL MEDICINE CLINIC | Age: 70
End: 2020-04-10

## 2020-04-10 DIAGNOSIS — M48.02 SPINAL STENOSIS IN CERVICAL REGION: ICD-10-CM

## 2020-04-10 RX ORDER — OXYCODONE HYDROCHLORIDE 5 MG/1
5 TABLET ORAL
Qty: 60 TAB | Refills: 0 | Status: SHIPPED | OUTPATIENT
Start: 2020-04-10 | End: 2020-06-30 | Stop reason: SDUPTHER

## 2020-04-10 NOTE — PATIENT INSTRUCTIONS
Office Policies Phone calls/patient messages: Please allow up to 24 hours for someone in the office to contact you about your call or message. Be mindful your provider may be out of the office or your message may require further review. We encourage you to use [employer] for your messages as this is a faster, more efficient way to communicate with our office Medication Refills: 
         
Prescription medications require 48-72 business hours to process. We encourage you to use [employer] for your refills. For controlled medications: Please allow 72 business hours to process. Certain medications may require you to  a written prescription at our office. NO narcotic/controlled medications will be prescribed after 4pm Monday through Friday or on weekends Form/Paperwork Completion: 
         
Please note a $25 fee may incur for all paperwork for completed by our providers. We ask that you allow 7-10 business days. Pre-payment is due prior to picking up/faxing the completed form. You may also download your forms to [employer] to have your doctor print off. 
 
 
1. Have you been to the ER, urgent care clinic since your last visit? Hospitalized since your last visit?no 2. Have you seen or consulted any other health care providers outside of the [address] [place] [address] since your last visit? Include any pap smears or colon screening.  no

## 2020-04-10 NOTE — PROGRESS NOTES
Mary Beth Vuong is a 77 y.o. male evaluated via telephone on 4/10/2020. Consent:  He and/or health care decision maker is aware that that he may receive a bill for this telephone service, depending on his insurance coverage, and has provided verbal consent to proceed: Yes      Documentation:      SUBJECTIVE  Mr. Mary Beth Vuong presents today acutely for      Chief Complaint   Patient presents with    Medication Evaluation     pt requesting an increase dosage on pain medication        He has had ongoing issues with pain. \"They oxycodone still helps, but I have to take three at a time. \" As we noted a few months ago:      He was painting and \"hurt my neck. \"  It got so bad he went to see Dr. Arpita White, and was sent to PT. Last week he did dry needling and \"it made a big difference. \"  Pain still severe--\"I could hardly get up this morning. \"   Chronic back pain: Pain generally is radiating especially down R leg. It is recalled that he underwent C-spine fusion in 2012, with Dr. Arpita White. Still, he gets tingling and throbbing at times, particularly after long days, or activity. OBJECTIVE  There were no vitals taken for this visit. No exam.     ASSESSMENT / PLAN  Diagnoses and all orders for this visit:    1. Spinal stenosis in cervical region  -     oxyCODONE IR (ROXICODONE) 5 mg immediate release tablet; Take 1 Tab by mouth every four (4) hours as needed for Pain for up to 14 days. Max Daily Amount: 30 mg. Follow-up and Dispositions    · Return if symptoms worsen or fail to improve. I affirm this is a Patient Initiated Episode with an Established Patient who has not had a related appointment within my department in the past 7 days or scheduled within the next 24 hours.     Total Time: minutes: 11-20 minutes    Note: not billable if this call serves to triage the patient into an appointment for the relevant concern      Krunal Smith MD

## 2020-06-15 ENCOUNTER — TELEPHONE (OUTPATIENT)
Dept: INTERNAL MEDICINE CLINIC | Age: 70
End: 2020-06-15

## 2020-06-15 DIAGNOSIS — E78.1 HYPERTRIGLYCERIDEMIA: ICD-10-CM

## 2020-06-15 DIAGNOSIS — I10 ESSENTIAL HYPERTENSION: ICD-10-CM

## 2020-06-15 DIAGNOSIS — E11.9 TYPE 2 DIABETES MELLITUS WITHOUT COMPLICATION, UNSPECIFIED WHETHER LONG TERM INSULIN USE (HCC): Primary | ICD-10-CM

## 2020-06-15 RX ORDER — ROSUVASTATIN CALCIUM 5 MG/1
5 TABLET, COATED ORAL DAILY
Qty: 90 TAB | Refills: 3 | Status: SHIPPED | OUTPATIENT
Start: 2020-06-15 | End: 2020-06-29 | Stop reason: SDUPTHER

## 2020-06-15 RX ORDER — VALSARTAN 160 MG/1
160 TABLET ORAL
Qty: 90 TAB | Refills: 3 | Status: SHIPPED | OUTPATIENT
Start: 2020-06-15 | End: 2020-06-29 | Stop reason: SDUPTHER

## 2020-06-15 NOTE — TELEPHONE ENCOUNTER
90 day mail order on these two. [facility]              Pt also needs OXYCODONE refilled at local [facility] on file. This medication is not on med list.  Please be sure this does NOT go to mail order.

## 2020-06-15 NOTE — TELEPHONE ENCOUNTER
Pt states DO NOT send lab orders to [facility].  These lab orders need to go to [facility].    Please fax those orders to  #[phone]

## 2020-06-15 NOTE — TELEPHONE ENCOUNTER
Patient states he would like to get his Lab Orders for his upcoming 6/29/20 appt faxed over to [facility] the [address] for him to go there Tomorrow Morning 1st thing to get blood work done Prior to his appt. Please call if any questions.  Thank you      [employer] Fax# is [phone]

## 2020-06-29 ENCOUNTER — TELEPHONE (OUTPATIENT)
Dept: INTERNAL MEDICINE CLINIC | Age: 70
End: 2020-06-29

## 2020-06-29 ENCOUNTER — VIRTUAL VISIT (OUTPATIENT)
Dept: INTERNAL MEDICINE CLINIC | Age: 70
End: 2020-06-29

## 2020-06-29 DIAGNOSIS — M89.9 LESION OF BONE OF LEFT LOWER LEG: Primary | ICD-10-CM

## 2020-06-29 DIAGNOSIS — E11.9 TYPE 2 DIABETES MELLITUS WITHOUT COMPLICATION, UNSPECIFIED WHETHER LONG TERM INSULIN USE (HCC): ICD-10-CM

## 2020-06-29 DIAGNOSIS — I10 ESSENTIAL HYPERTENSION: ICD-10-CM

## 2020-06-29 DIAGNOSIS — M48.02 SPINAL STENOSIS IN CERVICAL REGION: ICD-10-CM

## 2020-06-29 DIAGNOSIS — M54.40 LOW BACK PAIN WITH SCIATICA, SCIATICA LATERALITY UNSPECIFIED, UNSPECIFIED BACK PAIN LATERALITY, UNSPECIFIED CHRONICITY: ICD-10-CM

## 2020-06-29 RX ORDER — VALSARTAN 160 MG/1
160 TABLET ORAL
Qty: 90 TAB | Refills: 3 | Status: SHIPPED | OUTPATIENT
Start: 2020-06-29 | End: 2021-08-20

## 2020-06-29 RX ORDER — ROSUVASTATIN CALCIUM 5 MG/1
5 TABLET, COATED ORAL DAILY
Qty: 90 TAB | Refills: 3 | Status: SHIPPED | OUTPATIENT
Start: 2020-06-29 | End: 2021-04-12 | Stop reason: SDUPTHER

## 2020-06-29 NOTE — PROGRESS NOTES
Juliette Solorzano is a 77 y.o. male evaluated via audio only technology on 2020. Consent: He and/or his health care decision maker is aware that he may receive a bill for this audio only encounter, depending on his insurance coverage, and has provided verbal consent to proceed: Yes    I communicated with the patient and/or health care decision maker about the nature and details of the followin  Subjective:   Juliette Solorzano is a 77 y.o. male who was seen for Diabetes (4 month f.u ); Back Pain (pt c.o lower back pain this morning; pt rates pain 3/10; pt has been taking oxycotin when he has it ); and Medication Refill (pt also reqeust a refill on oxycotin )    SUBJECTIVE:   Mr. Juliette Solorzano is a 77 y.o. W male who is here for CPE; also follow up of routine medical issues. Chief Complaint   Patient presents with    Diabetes     4 month f.u     Back Pain     pt c.o lower back pain this morning; pt rates pain 3/10; pt has been taking oxycotin when he has it     Medication Refill     pt also reqeust a refill on oxycotin      \"COVID forced me to retire. \"   He had a dizzy spell, \"I was watching TV and I stood up and felt really dizzy. \" The episode lasted a few minutes. He has had   Neck pain ongoing, but not as bad as the low back. Dr. Steve Dodson apparently has retired. We noted 4 months ago: It got so bad he went to see Dr. Steve Dodson, and was sent to PT. Last week he did dry needling and \"it made a big difference. \"  Pain still severe--\"I could hardly get up this morning. \"   Chronic low back pain: Pain generally is radiating especially down R leg. He now has a \"bump\" behind L knee, and \"it's rock hard\". Painless. He has a \"pea sized\" lump, soft and mobile, in the arch of one of his feet. It is recalled that he underwent C-spine fusion in , with Dr. Steve Dodson. Still, he gets tingling and throbbing at times, particularly after long days, or activity.     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 He hasn't yet addressed the snoring. Dr. Tsering Pettit has treated him for bladder cancer, and administered BCG. Memory: Tested by Dr. Damien Glynn in 2014, and no dementia. We noted before: He would like to have his memory checked. He has been a bit forgetful at times--noticing issues with short term recall, losing objects, forgetting the name of a coworker, etc.  His wife is concerned. At this time, he is otherwise doing well and has brought no other complaints to my attention today. For a list of the medical issues addressed today, see the assessment and plan below. PMH: His ophthalmologist is Jeff Quesada  Past Medical History:   Diagnosis Date    Adopted     Arthritis     Went to see Dr. Princess Alan 2013, and was told he has arthritis in the hands and shoulder.  Back pain     Bladder [facility] ([facility]     procedure6/2018    DM (diabetes mellitus) ([facility] 10/18/2012    Hypertension     Hypertriglyceridemia 8/19/2014    Liver disease     hx hepatitis C 9 yrs. ago- treated    Malignant neoplasm of urinary bladder ([facility] 9/13/2018    Shingles     Unspecified adverse effect of anesthesia     BLOOD PRESSURE WENT EXTREMILY HIGH DURING ANESTHESIA for lt rotator cuff 2009       PSH:   Past Surgical History:   Procedure Laterality Date    HX CERVICAL FUSION  2012    C3-C5; Dr. Ivonne Boston HX ORTHOPAEDIC  2009    Left Rotator cuff repair    HX ORTHOPAEDIC  7/2011    Right Rotator cuff repair    HX POLYPECTOMY  2011    Dr. Matt Epps: He is allergic to codeine; metformin; and zostavax [zoster vaccine live (pf)]. MEDS:   Current Outpatient Medications   Medication Sig    rosuvastatin (CRESTOR) 5 mg tablet Take 1 Tab by mouth daily. Replaces lipitor on 12/20/19    valsartan (Diovan) 160 mg tablet Take 1 Tab by mouth nightly.  cholecalciferol (VITAMIN D3) (1000 Units /25 mcg) tablet Take 1,000 Units by mouth daily.  acyclovir (ZOVIRAX) 5 % topical cream Apply  to affected area five (5) times daily. (Patient taking differently: Apply  to affected area as needed.)    pramoxine-hydrocortisone (PROTOFOAM-HC) 2.5-1 % topical cream Apply  to affected area three (3) times daily.  PREVIDENT 5000 PLUS 1.1 % crea Use as directed    desonide (TRIDESILON) 0.05 % cream Apply  to affected area two (2) times a day.  aspirin 81 mg tablet Take 81 mg by mouth daily.  ketoconazole (NIZORAL) 2 % shampoo Apply  to affected area as needed.  ipratropium-albuterol (COMBIVENT RESPIMAT)  mcg/actuation inhaler Take 1 Puff by inhalation every six (6) hours as needed for Wheezing. No current facility-administered medications for this visit. FH: Adopted. SH: He is a . He reports that he quit smoking about 22 years ago. His smoking use included cigarettes. He has a 30.00 pack-year smoking history. He has never used smokeless tobacco. He reports current alcohol use. He reports that he does not use drugs. ROS: See above; Complete ROS otherwise negative. OBJECTIVE:   Vitals: There were no vitals taken for this visit. Lab Results   Component Value Date/Time    Hemoglobin A1c 6.7 (H) 06/18/2020 07:30 AM       Lab Results   Component Value Date/Time    Cholesterol, total 116 06/18/2020 07:30 AM    HDL Cholesterol 47 06/18/2020 07:30 AM    LDL-CHOLESTEROL 42 06/18/2020 07:30 AM    LDL, calculated 38 02/27/2020 09:51 AM    VLDL, calculated 38 02/27/2020 09:51 AM    Triglyceride 200 (H) 06/18/2020 07:30 AM    Cholesterol/HDL ratio 2.5 06/18/2020 07:30 AM       ASSESSMENT/ PLAN:    1. Diabetes mellitus: Controlled at last check. Continue metformin bid. 2. Bony lesion L tibia: X-ray ordered. 3. Dizziness: Sounds like orthostatic hypotension. Discussed self care. 4. Sciatica: Ongoing. Following with spine doctor (Will need to find a replacement for Dr. Yany Benjamin).   5. Neck pain: PT. Follow up w/spine specialist.   6. Dyslipidemia: Labs pending. Discussed statin and low dose aspirin for cardiac protection. DM confers high CV risk. 7. History of Hepatitis C s/p treatment: CMP shows transaminases back to normal; recheck. 8. Low libido: Not discussed today. 9. Memory disturbance: Stable. No dementia per neuropsych testing. 10. Snoring: Referred prev to orthodontist.  11. Urinary frequency and incontinence: Now on vesicare. Seeing urologist.   12. Bladder cancer, s/p BCG per Dr. Tucker Bosch. I have reviewed the patient's medications and risks/side effects/benefits were discussed. Diagnosis(-es) explained to patient and questions answered. Literature provided where appropriate. Follow up 4 months DM          I affirm this is a Patient-Initiated Episode with a Patient who has not had a related appointment within my department in the past 7 days or scheduled within the next 24 hours.     Total Time: minutes: 11-20 minutes    Note: not billable if this call serves to triage the patient into an appointment for the relevant concern      Nilsa Shaikh MD

## 2020-06-29 NOTE — TELEPHONE ENCOUNTER
----- Message from Lei Infante sent at 6/29/2020  2:20 PM EDT -----  Regarding: /Telephone  General Message/Vendor Calls    Caller's first and last name:      Reason for call:  Medication \"Oxycodone\"     Callback required yes/no and why:  Yes, to discuss refill request status.     Best contact number(s):  [phone]    Details to clarify the request:      Arlyn De Leon      Copy/paste envera

## 2020-06-29 NOTE — PATIENT INSTRUCTIONS
Office Policies Phone calls/patient messages: Please allow up to 24 hours for someone in the office to contact you about your call or message. Be mindful your provider may be out of the office or your message may require further review. We encourage you to use [employer] for your messages as this is a faster, more efficient way to communicate with our office Medication Refills: 
         
Prescription medications require 48-72 business hours to process. We encourage you to use [employer] for your refills. For controlled medications: Please allow 72 business hours to process. Certain medications may require you to  a written prescription at our office. NO narcotic/controlled medications will be prescribed after 4pm Monday through Friday or on weekends Form/Paperwork Completion: 
         
Please note a $25 fee may incur for all paperwork for completed by our providers. We ask that you allow 7-10 business days. Pre-payment is due prior to picking up/faxing the completed form. You may also download your forms to [employer] to have your doctor print off. 
 
 
1. Have you been to the ER, urgent care clinic since your last visit? Hospitalized since your last visit?no 2. Have you seen or consulted any other health care providers outside of the [address] [place] [address] since your last visit? Include any pap smears or colon screening.  no

## 2020-06-30 RX ORDER — OXYCODONE HYDROCHLORIDE 5 MG/1
5 TABLET ORAL
Qty: 60 TAB | Refills: 0 | Status: CANCELLED | OUTPATIENT
Start: 2020-06-30 | End: 2020-07-03

## 2020-06-30 RX ORDER — OXYCODONE HYDROCHLORIDE 5 MG/1
5 TABLET ORAL
Qty: 60 TAB | Refills: 0 | Status: SHIPPED | OUTPATIENT
Start: 2020-06-30 | End: 2020-09-11 | Stop reason: SDUPTHER

## 2020-06-30 NOTE — TELEPHONE ENCOUNTER
Identified patient 2 identifiers verified. Patient aware that prescription for oxycodone was approved.

## 2020-07-07 ENCOUNTER — HOSPITAL ENCOUNTER (OUTPATIENT)
Dept: GENERAL RADIOLOGY | Age: 70
Discharge: HOME | End: 2020-07-07
Payer: COMMERCIAL

## 2020-07-07 DIAGNOSIS — M89.9 LESION OF BONE OF LEFT LOWER LEG: ICD-10-CM

## 2020-07-07 PROCEDURE — 73590 X-RAY EXAM OF LOWER LEG: CPT

## 2020-07-10 ENCOUNTER — TELEPHONE (OUTPATIENT)
Dept: INTERNAL MEDICINE CLINIC | Age: 70
End: 2020-07-10

## 2020-07-10 NOTE — TELEPHONE ENCOUNTER
----- Message from Sugey Balbuena sent at 7/10/2020 11:40 AM EDT -----  Regarding: DR Dos Santos/telephone  Contact: [phone]  General Message/Vendor Calls    Caller's first and last name:Gage Bucioangeleshank      Reason for call:requesting xray results      Callback required yes/no and why:yes      Best contact number(s):[phone]      Details to clarify the request:      Sugey Balbuena

## 2020-07-10 NOTE — TELEPHONE ENCOUNTER
Called, spoke with Pt. Two pt identifiers confirmed. Pt informed of x-ray results per Dr. Tara Rodriguez. Pt verbalized understanding of information discussed w/ no further questions at this time.

## 2020-09-11 ENCOUNTER — TELEPHONE (OUTPATIENT)
Dept: INTERNAL MEDICINE CLINIC | Age: 70
End: 2020-09-11

## 2020-09-11 ENCOUNTER — VIRTUAL VISIT (OUTPATIENT)
Dept: INTERNAL MEDICINE CLINIC | Age: 70
End: 2020-09-11
Payer: COMMERCIAL

## 2020-09-11 DIAGNOSIS — M79.644 THUMB PAIN, RIGHT: ICD-10-CM

## 2020-09-11 DIAGNOSIS — K59.00 CONSTIPATION, UNSPECIFIED CONSTIPATION TYPE: ICD-10-CM

## 2020-09-11 DIAGNOSIS — J01.90 ACUTE SINUSITIS, RECURRENCE NOT SPECIFIED, UNSPECIFIED LOCATION: Primary | ICD-10-CM

## 2020-09-11 DIAGNOSIS — M48.02 SPINAL STENOSIS IN CERVICAL REGION: ICD-10-CM

## 2020-09-11 DIAGNOSIS — E11.9 TYPE 2 DIABETES MELLITUS WITHOUT COMPLICATION, UNSPECIFIED WHETHER LONG TERM INSULIN USE (HCC): ICD-10-CM

## 2020-09-11 PROCEDURE — 99214 OFFICE O/P EST MOD 30 MIN: CPT | Performed by: INTERNAL MEDICINE

## 2020-09-11 RX ORDER — OXYCODONE HYDROCHLORIDE 5 MG/1
5 TABLET ORAL
Qty: 60 TAB | Refills: 0 | Status: SHIPPED | OUTPATIENT
Start: 2020-09-11 | End: 2020-10-29 | Stop reason: SDUPTHER

## 2020-09-11 RX ORDER — AMOXICILLIN AND CLAVULANATE POTASSIUM 875; 125 MG/1; MG/1
1 TABLET, FILM COATED ORAL EVERY 12 HOURS
Qty: 20 TAB | Refills: 0 | Status: SHIPPED | OUTPATIENT
Start: 2020-09-11 | End: 2020-10-29 | Stop reason: SDUPTHER

## 2020-09-11 NOTE — PATIENT INSTRUCTIONS
Office Policies Phone calls/patient messages: Please allow up to 24 hours for someone in the office to contact you about your call or message. Be mindful your provider may be out of the office or your message may require further review. We encourage you to use [employer] for your messages as this is a faster, more efficient way to communicate with our office Medication Refills: 
         
Prescription medications require 48-72 business hours to process. We encourage you to use [employer] for your refills. For controlled medications: Please allow 72 business hours to process. Certain medications may require you to  a written prescription at our office. NO narcotic/controlled medications will be prescribed after 4pm Monday through Friday or on weekends Form/Paperwork Completion: 
         
Please note a $25 fee may incur for all paperwork for completed by our providers. We ask that you allow 7-10 business days. Pre-payment is due prior to picking up/faxing the completed form. You may also download your forms to [employer] to have your doctor print off. 
 
 
1. Have you been to the ER, urgent care clinic since your last visit? Hospitalized since your last visit?no 2. Have you seen or consulted any other health care providers outside of the [address] [place] [address] since your last visit? Include any pap smears or colon screening.  no

## 2020-09-11 NOTE — PROGRESS NOTES
Brendon Edwards is a 77 y.o. male who was seen by synchronous (real-time) audio-video technology on 9/11/2020 for Sinus Infection (pt c.o congestion in throat in the morning (dark yellow); dry throat through out the day ); Constipation (pt c.o feeling constipation x 2 weeks- pt did start medication solifemacin); and Arthritis (pt c.o thumb soreness x 3 weeks; pt has been using brace; pt has been using voltaran gel; today pt rates pain 8/10)        Progress Note       SUBJECTIVE  Mr. Brendon Edwards presents today acutely for     Chief Complaint   Patient presents with    Sinus Infection     pt c.o congestion in throat in the morning (dark yellow); dry throat through out the day     Constipation     pt c.o feeling constipation x 2 weeks- pt did start medication solifemacin    Arthritis     pt c.o thumb soreness x 3 weeks; pt has been using brace; pt has been using voltaran gel; today pt rates pain 8/10     He has had the sinus pressure, drainage, and discolored mucus, sometimes pink. Since starting solifenacin from Dr. Jaime Reyes about 2 weeks, he now has constipation and dry mouth. His wife just got him some docusate. The arthritis of R thumb base is worse. \"I can't pick anything up. \"  He has been using voltaren gel and a thumb immobilizer at night. Past Medical History:   Diagnosis Date    Adopted     Arthritis     Went to see Dr. Jonas Barillas 2013, and was told he has arthritis in the hands and shoulder.  Back pain     Bladder cancer ([address]     procedure6/2018    DM (diabetes mellitus) ([address] 10/18/2012    Hypertension     Hypertriglyceridemia 8/19/2014    Liver disease     hx hepatitis C 9 yrs.  ago- treated    Malignant neoplasm of urinary bladder ([address] 9/13/2018    Shingles     Unspecified adverse effect of anesthesia     BLOOD PRESSURE WENT EXTREMILY HIGH DURING ANESTHESIA for lt rotator cuff 2009     Current Outpatient Medications on File Prior to Visit   Medication Sig Dispense Refill    rosuvastatin (CRESTOR) 5 mg tablet Take 1 Tab by mouth daily. Replaces lipitor on 12/20/19 90 Tab 3    valsartan (Diovan) 160 mg tablet Take 1 Tab by mouth nightly. 90 Tab 3    cholecalciferol (VITAMIN D3) (1000 Units /25 mcg) tablet Take 1,000 Units by mouth daily.  acyclovir (ZOVIRAX) 5 % topical cream Apply  to affected area five (5) times daily. (Patient taking differently: Apply  to affected area as needed.) 5 g 11    pramoxine-hydrocortisone (PROTOFOAM-HC) 2.5-1 % topical cream Apply  to affected area three (3) times daily. 10 g 1    PREVIDENT 5000 PLUS 1.1 % crea Use as directed 1 Tube 11    desonide (TRIDESILON) 0.05 % cream Apply  to affected area two (2) times a day. 15 g 1    aspirin 81 mg tablet Take 81 mg by mouth daily.  ketoconazole (NIZORAL) 2 % shampoo Apply  to affected area as needed. 11    ipratropium-albuterol (COMBIVENT RESPIMAT)  mcg/actuation inhaler Take 1 Puff by inhalation every six (6) hours as needed for Wheezing. 1 Inhaler 0     No current facility-administered medications on file prior to visit. SH: He reports that he quit smoking about 23 years ago. His smoking use included cigarettes. He has a 30.00 pack-year smoking history. He has never used smokeless tobacco. He reports current alcohol use. He reports that he does not use drugs. ROS: Complete review of systems was performed and is otherwise unremarkable except as noted elsewhere. OBJECTIVE  There were no vitals taken for this visit. See below for exam data    ASSESSMENT / PLAN    Acute sinusitis, recurrence not specified, unspecified location:   -     amoxicillin-clavulanate (AUGMENTIN) 875-125 mg per tablet; Take 1 Tab by mouth every twelve (12) hours for 10 days. , Normal, Disp-20 Tab,R-0    Thumb pain, right: May continue current Tx. Also,   -     REFERRAL TO ORTHOPEDICS: Dr. Ken Chambers, hand specialist.     Spinal stenosis in cervical region: Stable pain.  Medicine is keeping him functional. No evidence of misuse. -     oxyCODONE IR (ROXICODONE) 5 mg immediate release tablet; Take 1 Tab by mouth every four (4) hours as needed for Pain for up to 14 days. Max Daily Amount: 30 mg., Normal, Disp-60 Tab,R-0    Constipation, unspecified constipation type: Stool softener fine. May consider miralax next. F/U in October as planned. Labs ordered in anticipation of visit. Exam data:   No flowsheet data found. [INSTRUCTIONS:  \"[x]\" Indicates a positive item  \"[]\" Indicates a negative item  -- DELETE ALL ITEMS NOT EXAMINED]    Constitutional: [x] Appears well-developed and well-nourished [x] No apparent distress      [] Abnormal -     Mental status: [x] Alert and awake  [x] Oriented to person/place/time [x] Able to follow commands    [] Abnormal -     Eyes:   EOM    [x]  Normal    [] Abnormal -   Sclera  [x]  Normal    [] Abnormal -          Discharge [x]  None visible   [] Abnormal -     HENT: [x] Normocephalic, atraumatic  [] Abnormal -   [x] Mouth/Throat: Mucous membranes are moist    External Ears [x] Normal  [] Abnormal -    Neck: [x] No visualized mass [] Abnormal -     Pulmonary/Chest: [x] Respiratory effort normal   [x] No visualized signs of difficulty breathing or respiratory distress        [] Abnormal -      Musculoskeletal:   [x] Normal gait with no signs of ataxia         [x] Normal range of motion of neck        [] Abnormal -     Neurological:        [x] No Facial Asymmetry (Cranial nerve 7 motor function) (limited exam due to video visit)          [x] No gaze palsy        [] Abnormal -          Skin:        [x] No significant exanthematous lesions or discoloration noted on facial skin         [] Abnormal -            Psychiatric:       [x] Normal Affect [] Abnormal -       Other pertinent observable physical exam findings:-        We discussed the expected course, resolution and complications of the diagnosis(es) in detail.   Medication risks, benefits, costs, interactions, and alternatives were discussed as indicated. I advised him to contact the office if his condition worsens, changes or fails to improve as anticipated. He expressed understanding with the diagnosis(es) and plan. Suzanne Fine, who was evaluated through a patient-initiated, synchronous (real-time) audio-video encounter, and/or his healthcare decision maker, is aware that it is a billable service, with coverage as determined by his insurance carrier. He provided verbal consent to proceed: YES, and patient identification was verified. It was conducted pursuant to the emergency declaration under the [address] [place] [address], [address] [place] [address] authority and the [employer] Act. A caregiver was present when appropriate. Ability to conduct physical exam was limited. I was in office. The patient was at home or otherwise outside the office.       Gaudencio Villalobos MD

## 2020-09-18 ENCOUNTER — TELEPHONE (OUTPATIENT)
Dept: INTERNAL MEDICINE CLINIC | Age: 70
End: 2020-09-18

## 2020-09-18 NOTE — TELEPHONE ENCOUNTER
I return call to pt. 2 pt identifiers verified- pt states that he feels his arthritis is getting worse and wants to see a rheumatologist. Pt states that he do not need a referral through his insurance.     Pt given the following #'s to rheumatology & informed to make sure they take his insurance    Dr Kade Bledsoe [phone]  Dr Holly Rodgers [phone]    Pt had no further questions

## 2020-10-20 LAB
ALB/GLOBRATIO, 58C: 1.7 (CALC) (ref 1–2.5)
ALBUMIN SERPL-MCNC: 4.5 G/DL (ref 3.6–5.1)
ALKALINE PHOSPHATASE, TOTAL, 25002000: 55 U/L (ref 35–144)
ALT SERPL-CCNC: 31 U/L (ref 9–46)
AST SERPL W P-5'-P-CCNC: 30 U/L (ref 10–35)
BASOPHILS # BLD: 72 CELLS/UL (ref 0–200)
BASOPHILS NFR BLD: 1.3 %
BILIRUB SERPL-MCNC: 0.6 MG/DL (ref 0.2–1.2)
BUN SERPL-MCNC: 14 MG/DL (ref 7–25)
BUN/CREATININE RATIO,BUCR: ABNORMAL (CALC) (ref 6–22)
CALCIUM SERPL-MCNC: 9.7 MG/DL (ref 8.6–10.3)
CHLORIDE SERPL-SCNC: 104 MMOL/L (ref 98–110)
CHOL/HDL RATIO,CHHDX: 2.6 (CALC)
CHOLEST SERPL-MCNC: 114 MG/DL
CO2 SERPL-SCNC: 28 MMOL/L (ref 20–32)
CREAT SERPL-MCNC: 0.88 MG/DL (ref 0.7–1.25)
EAG (MG/DL),9916804: 146 (CALC)
EAG (MMOL/L),9916805: 8.1 (CALC)
EOSINOPHIL # BLD: 220 CELLS/UL (ref 15–500)
EOSINOPHIL NFR BLD: 4 %
ERYTHROCYTE [DISTWIDTH] IN BLOOD BY AUTOMATED COUNT: 12.7 % (ref 11–15)
GLOBULIN,GLOB: 2.6 G/DL (CALC) (ref 1.9–3.7)
GLUCOSE SERPL-MCNC: 139 MG/DL (ref 65–99)
HBA1C MFR BLD HPLC: 6.7 % OF TOTAL HGB
HCT VFR BLD AUTO: 48.1 % (ref 38.5–50)
HDLC SERPL-MCNC: 44 MG/DL
HGB BLD-MCNC: 16 G/DL (ref 13.2–17.1)
LDL-CHOLESTEROL: 46 MG/DL (CALC)
LYMPHOCYTES # BLD: 1843 CELLS/UL (ref 850–3900)
LYMPHOCYTES NFR BLD: 33.5 %
MCH RBC QN AUTO: 31.3 PG (ref 27–33)
MCHC RBC AUTO-ENTMCNC: 33.3 G/DL (ref 32–36)
MCV RBC AUTO: 94.1 FL (ref 80–100)
MONOCYTES # BLD: 523 CELLS/UL (ref 200–950)
MONOCYTES NFR BLD: 9.5 %
NEUTROPHILS # BLD AUTO: 2844 CELLS/UL (ref 1500–7800)
NEUTROPHILS # BLD: 51.7 %
NON-HDL CHOLESTEROL, 011976: 70 MG/DL (CALC)
PLATELET # BLD AUTO: 153 THOUSAND/UL (ref 140–400)
PMV BLD AUTO: 10.7 FL (ref 7.5–12.5)
POTASSIUM SERPL-SCNC: 4.5 MMOL/L (ref 3.5–5.3)
PROT SERPL-MCNC: 7.1 G/DL (ref 6.1–8.1)
RBC # BLD AUTO: 5.11 MILLION/UL (ref 4.2–5.8)
SODIUM SERPL-SCNC: 139 MMOL/L (ref 135–146)
TRIGL SERPL-MCNC: 162 MG/DL (ref ?–150)
WBC # BLD AUTO: 5.5 THOUSAND/UL (ref 3.8–10.8)

## 2020-10-29 ENCOUNTER — TELEPHONE (OUTPATIENT)
Dept: INTERNAL MEDICINE CLINIC | Age: 70
End: 2020-10-29

## 2020-10-29 ENCOUNTER — VIRTUAL VISIT (OUTPATIENT)
Dept: INTERNAL MEDICINE CLINIC | Age: 70
End: 2020-10-29
Payer: COMMERCIAL

## 2020-10-29 DIAGNOSIS — E78.1 HYPERTRIGLYCERIDEMIA: ICD-10-CM

## 2020-10-29 DIAGNOSIS — E11.9 TYPE 2 DIABETES MELLITUS WITHOUT COMPLICATION, UNSPECIFIED WHETHER LONG TERM INSULIN USE (HCC): ICD-10-CM

## 2020-10-29 DIAGNOSIS — J01.90 ACUTE SINUSITIS, RECURRENCE NOT SPECIFIED, UNSPECIFIED LOCATION: ICD-10-CM

## 2020-10-29 DIAGNOSIS — M48.02 SPINAL STENOSIS IN CERVICAL REGION: ICD-10-CM

## 2020-10-29 DIAGNOSIS — I10 ESSENTIAL HYPERTENSION: Primary | ICD-10-CM

## 2020-10-29 PROCEDURE — 99442 PR PHYS/QHP TELEPHONE EVALUATION 11-20 MIN: CPT | Performed by: INTERNAL MEDICINE

## 2020-10-29 RX ORDER — AMOXICILLIN AND CLAVULANATE POTASSIUM 875; 125 MG/1; MG/1
1 TABLET, FILM COATED ORAL EVERY 12 HOURS
Qty: 20 TAB | Refills: 0 | Status: SHIPPED | OUTPATIENT
Start: 2020-10-29 | End: 2020-11-08

## 2020-10-29 RX ORDER — MELOXICAM 15 MG/1
TABLET ORAL
COMMUNITY
Start: 2020-10-01 | End: 2021-06-23

## 2020-10-29 RX ORDER — OXYCODONE HYDROCHLORIDE 5 MG/1
5 TABLET ORAL
Qty: 60 TAB | Refills: 0 | Status: SHIPPED | OUTPATIENT
Start: 2020-10-29 | End: 2020-12-16 | Stop reason: SDUPTHER

## 2020-10-29 NOTE — PROGRESS NOTES
Debbie Quezada is a 77 y.o. male evaluated via audio only technology on 10/29/2020. Consent: He and/or his health care decision maker is aware that he may receive a bill for this audio only encounter, depending on his insurance coverage, and has provided verbal consent to proceed: Yes    I communicated with the patient and/or health care decision maker about the nature and details of the followin  Subjective:   Debbie Quezada is a 77 y.o. male who was seen for Diabetes (4 month f.u)    SUBJECTIVE:   Mr. Debbie Quezada is a 77 y.o. W male who is here for CPE; also follow up of routine medical issues. Chief Complaint   Patient presents with    Diabetes     4 month f.u       He is now retired. He has begun seeing Dr. Tamanna Bill, rheumatologist, for arthritis. He has had issues with pain in R knee, elbow, thumb. He will see him again at 2:45 today. He was started on meloxicam, and \"That has helped everything but my spine. \"  \"The pain sometimes gets me up in the middle of the night. \"   No further dizziness. \"I had a chest infection. The antibiotics cleared it up, but now it's coming back. \" Coughing up some green and yellow sputum. Neck pain ongoing, but not as bad as the low back. Dr. Cadence Lai apparently has retired. We noted 4 months ago: It got so bad he went to see Dr. Cadence Lai, and was sent to PT. Last week he did dry needling and \"it made a big difference. \"  Pain still severe--\"I could hardly get up this morning. \"   Chronic low back pain: Pain generally is radiating especially down R leg. He now has a \"bump\" behind L knee, and \"it's rock hard\". Painless. He has a \"pea sized\" lump, soft and mobile, in the arch of one of his feet. It is recalled that he underwent C-spine fusion in , with Dr. Cadence Lai. Still, he gets tingling and throbbing at times, particularly after long days, or activity. Stress: Better since [facility].     He hasn't yet addressed the snoring. Dr. Christel Tobias has treated him for bladder cancer, and administered BCG. Memory: Tested by Dr. Joanne Pandey in 2014, and no dementia. We noted before: He would like to have his memory checked. He has been a bit forgetful at times--noticing issues with short term recall, losing objects, forgetting the name of a coworker, etc.  His wife is concerned. At this time, he is otherwise doing well and has brought no other complaints to my attention today. For a list of the medical issues addressed today, see the assessment and plan below. PMH: His ophthalmologist is Mariana Martinez  Past Medical History:   Diagnosis Date    Adopted     Arthritis     Went to see Dr. Chao Gotti 2013, and was told he has arthritis in the hands and shoulder.  Back pain     Bladder cancer ([facility] [address]     procedure6/2018    DM (diabetes mellitus) ([facility] [address] 10/18/2012    Hypertension     Hypertriglyceridemia 8/19/2014    Liver disease     hx hepatitis C 9 yrs. ago- treated    Malignant neoplasm of urinary bladder ([facility] [address] 9/13/2018    Shingles     Unspecified adverse effect of anesthesia     BLOOD PRESSURE WENT EXTREMILY HIGH DURING ANESTHESIA for lt rotator cuff 2009       PSH:   Past Surgical History:   Procedure Laterality Date    HX CERVICAL FUSION  2012    C3-C5; Dr. Ortiz Whitley HX ORTHOPAEDIC  2009    Left Rotator cuff repair    HX ORTHOPAEDIC  7/2011    Right Rotator cuff repair    HX POLYPECTOMY  2011    Dr. Renny Banks: He is allergic to codeine; metformin; and zostavax [zoster vaccine live (pf)]. MEDS:   Current Outpatient Medications   Medication Sig    meloxicam (MOBIC) 15 mg tablet TAKE 1 TABLET BY MOUTH EVERY DAY    rosuvastatin (CRESTOR) 5 mg tablet Take 1 Tab by mouth daily. Replaces lipitor on 12/20/19    valsartan (Diovan) 160 mg tablet Take 1 Tab by mouth nightly.  cholecalciferol (VITAMIN D3) (1000 Units /25 mcg) tablet Take 1,000 Units by mouth daily.     acyclovir (ZOVIRAX) 5 % topical cream Apply  to affected area five (5) times daily. (Patient taking differently: Apply  to affected area as needed.)    pramoxine-hydrocortisone (PROTOFOAM-HC) 2.5-1 % topical cream Apply  to affected area three (3) times daily.  PREVIDENT 5000 PLUS 1.1 % crea Use as directed    desonide (TRIDESILON) 0.05 % cream Apply  to affected area two (2) times a day.  aspirin 81 mg tablet Take 81 mg by mouth daily.  ketoconazole (NIZORAL) 2 % shampoo Apply  to affected area as needed.  ipratropium-albuterol (COMBIVENT RESPIMAT)  mcg/actuation inhaler Take 1 Puff by inhalation every six (6) hours as needed for Wheezing. No current facility-administered medications for this visit. FH: Adopted. SH: He is a retired . He reports that he quit smoking about 23 years ago. His smoking use included cigarettes. He has a 30.00 pack-year smoking history. He has never used smokeless tobacco. He reports current alcohol use. He reports that he does not use drugs. ROS: See above; Complete ROS otherwise negative. OBJECTIVE:   Vitals: There were no vitals taken for this visit. Lab Results   Component Value Date/Time    Hemoglobin A1c 6.7 (H) 10/19/2020 07:59 AM       Lab Results   Component Value Date/Time    Cholesterol, total 114 10/19/2020 07:59 AM    HDL Cholesterol 44 10/19/2020 07:59 AM    LDL-CHOLESTEROL 46 10/19/2020 07:59 AM    LDL, calculated 38 02/27/2020 09:51 AM    VLDL, calculated 38 02/27/2020 09:51 AM    Triglyceride 162 (H) 10/19/2020 07:59 AM    Cholesterol/HDL ratio 2.6 10/19/2020 07:59 AM       ASSESSMENT/ PLAN:    1. Diabetes mellitus: Controlled at last check. Continue metformin bid. 2. Bony lesion L tibia: X-ray showed benign exostosis. 3. Dizziness: Sounds like orthostatic hypotension. Discussed self care. 4. Sciatica: Ongoing.  Following with spine doctor (Will need to find a replacement for  Scioscia). 5. Neck pain: PT. Follow up w/spine specialist.   6. Dyslipidemia: Labs okay on crestor. Discussed previously role of statin and low dose aspirin for cardiac protection. DM confers high CV risk. 7. History of Hepatitis C s/p treatment: CMP shows transaminases back to normal; recheck. 8. Low libido: Not discussed today. 9. Memory disturbance: Stable. No dementia per neuropsych testing. 10. Snoring: Referred prev to orthodontist.  11. Urinary frequency and incontinence: Now on vesicare. Seeing urologist.   12. Bladder cancer, s/p BCG per Dr. Norma Gannon. I have reviewed the patient's medications and risks/side effects/benefits were discussed. Diagnosis(-es) explained to patient and questions answered. Literature provided where appropriate. Follow up 4 months DM          I affirm this is a Patient-Initiated Episode with a Patient who has not had a related appointment within my department in the past 7 days or scheduled within the next 24 hours.     Total Time: minutes: 11-20 minutes    Note: not billable if this call serves to triage the patient into an appointment for the relevant concern      Mona Yo MD

## 2020-10-29 NOTE — PATIENT INSTRUCTIONS
Office Policies Phone calls/patient messages: Please allow up to 24 hours for someone in the office to contact you about your call or message. Be mindful your provider may be out of the office or your message may require further review. We encourage you to use [employer] for your messages as this is a faster, more efficient way to communicate with our office Medication Refills: 
         
Prescription medications require 48-72 business hours to process. We encourage you to use [employer] for your refills. For controlled medications: Please allow 72 business hours to process. Certain medications may require you to  a written prescription at our office. NO narcotic/controlled medications will be prescribed after 4pm Monday through Friday or on weekends Form/Paperwork Completion: 
         
Please note a $25 fee may incur for all paperwork for completed by our providers. We ask that you allow 7-10 business days. Pre-payment is due prior to picking up/faxing the completed form. You may also download your forms to [employer] to have your doctor print off. 
 
1. Have you been to the ER, urgent care clinic since your last visit? Hospitalized since your last visit? no 
2. Have you seen or consulted any other health care providers outside of the [address] [place] [address] since your last visit? Include any pap smears or colon screening.  no

## 2020-12-16 DIAGNOSIS — M48.02 SPINAL STENOSIS IN CERVICAL REGION: ICD-10-CM

## 2020-12-16 NOTE — TELEPHONE ENCOUNTER
Patient request refill to be done thru [facility]. Please call if any questions. Patient reminded of 48-72 bus hr turn around.

## 2020-12-18 RX ORDER — OXYCODONE HYDROCHLORIDE 5 MG/1
5 TABLET ORAL
Qty: 60 TAB | Refills: 0 | Status: SHIPPED | OUTPATIENT
Start: 2020-12-18 | End: 2021-02-05 | Stop reason: SDUPTHER

## 2020-12-18 NOTE — TELEPHONE ENCOUNTER
----- Message from Gianna Martinez sent at 12/18/2020  9:10 AM EST -----  Regarding: Dr. Luis Christie first and last name: self  Reason for call: Put in request to have prescription filled 48 hours ago and would like to be sure it is filled before the Hali break and the doctor goes on vacation. Callback required yes/no and why: Yes, to confirm it was filled.   Best contact number(s): [phone]  Details to clarify the request:      Message from [facility]

## 2021-02-04 DIAGNOSIS — E11.9 TYPE 2 DIABETES MELLITUS WITHOUT COMPLICATION, UNSPECIFIED WHETHER LONG TERM INSULIN USE (HCC): ICD-10-CM

## 2021-02-04 DIAGNOSIS — E78.1 HYPERTRIGLYCERIDEMIA: ICD-10-CM

## 2021-02-05 ENCOUNTER — TELEPHONE (OUTPATIENT)
Dept: INTERNAL MEDICINE CLINIC | Age: 71
End: 2021-02-05

## 2021-02-05 DIAGNOSIS — M48.02 SPINAL STENOSIS IN CERVICAL REGION: ICD-10-CM

## 2021-02-05 RX ORDER — OXYCODONE HYDROCHLORIDE 5 MG/1
5 TABLET ORAL
Qty: 60 TAB | Refills: 0 | Status: SHIPPED | OUTPATIENT
Start: 2021-02-05 | End: 2021-03-17 | Stop reason: SDUPTHER

## 2021-02-12 LAB
ALB/GLOBRATIO, 58C: 1.9 (CALC) (ref 1–2.5)
ALBUMIN SERPL-MCNC: 4.7 G/DL (ref 3.6–5.1)
ALKALINE PHOSPHATASE, TOTAL, 25002000: 58 U/L (ref 35–144)
ALT SERPL-CCNC: 39 U/L (ref 9–46)
AST SERPL W P-5'-P-CCNC: 37 U/L (ref 10–35)
BASOPHILS # BLD: 60 CELLS/UL (ref 0–200)
BASOPHILS NFR BLD: 1 %
BILIRUB SERPL-MCNC: 0.8 MG/DL (ref 0.2–1.2)
BUN SERPL-MCNC: 14 MG/DL (ref 7–25)
BUN/CREATININE RATIO,BUCR: ABNORMAL (CALC) (ref 6–22)
CALCIUM SERPL-MCNC: 9.7 MG/DL (ref 8.6–10.3)
CHLORIDE SERPL-SCNC: 104 MMOL/L (ref 98–110)
CHOL/HDL RATIO,CHHDX: 2.5 (CALC)
CHOLEST SERPL-MCNC: 125 MG/DL
CO2 SERPL-SCNC: 30 MMOL/L (ref 20–32)
CREAT SERPL-MCNC: 0.8 MG/DL (ref 0.7–1.25)
EAG (MG/DL),9916804: 143 (CALC)
EAG (MMOL/L),9916805: 7.9 (CALC)
EOSINOPHIL # BLD: 240 CELLS/UL (ref 15–500)
EOSINOPHIL NFR BLD: 4 %
ERYTHROCYTE [DISTWIDTH] IN BLOOD BY AUTOMATED COUNT: 12.9 % (ref 11–15)
GLOBULIN,GLOB: 2.5 G/DL (CALC) (ref 1.9–3.7)
GLUCOSE SERPL-MCNC: 139 MG/DL (ref 65–99)
HBA1C MFR BLD HPLC: 6.6 % OF TOTAL HGB
HCT VFR BLD AUTO: 47.2 % (ref 38.5–50)
HDLC SERPL-MCNC: 51 MG/DL
HGB BLD-MCNC: 16.3 G/DL (ref 13.2–17.1)
LDL-CHOLESTEROL: 48 MG/DL (CALC)
LYMPHOCYTES # BLD: 2430 CELLS/UL (ref 850–3900)
LYMPHOCYTES NFR BLD: 40.5 %
MCH RBC QN AUTO: 32.1 PG (ref 27–33)
MCHC RBC AUTO-ENTMCNC: 34.5 G/DL (ref 32–36)
MCV RBC AUTO: 92.9 FL (ref 80–100)
MONOCYTES # BLD: 504 CELLS/UL (ref 200–950)
MONOCYTES NFR BLD: 8.4 %
NEUTROPHILS # BLD AUTO: 2766 CELLS/UL (ref 1500–7800)
NEUTROPHILS # BLD: 46.1 %
NON-HDL CHOLESTEROL, 011976: 74 MG/DL (CALC)
PLATELET # BLD AUTO: 150 THOUSAND/UL (ref 140–400)
PMV BLD AUTO: 10.8 FL (ref 7.5–12.5)
POTASSIUM SERPL-SCNC: 5 MMOL/L (ref 3.5–5.3)
PROT SERPL-MCNC: 7.2 G/DL (ref 6.1–8.1)
RBC # BLD AUTO: 5.08 MILLION/UL (ref 4.2–5.8)
SODIUM SERPL-SCNC: 141 MMOL/L (ref 135–146)
TRIGL SERPL-MCNC: 179 MG/DL (ref ?–150)
WBC # BLD AUTO: 6 THOUSAND/UL (ref 3.8–10.8)

## 2021-02-24 ENCOUNTER — VIRTUAL VISIT (OUTPATIENT)
Dept: INTERNAL MEDICINE CLINIC | Age: 71
End: 2021-02-24
Payer: COMMERCIAL

## 2021-02-24 ENCOUNTER — TELEPHONE (OUTPATIENT)
Dept: INTERNAL MEDICINE CLINIC | Age: 71
End: 2021-02-24

## 2021-02-24 DIAGNOSIS — E11.9 TYPE 2 DIABETES MELLITUS WITHOUT COMPLICATION, UNSPECIFIED WHETHER LONG TERM INSULIN USE (HCC): Primary | ICD-10-CM

## 2021-02-24 DIAGNOSIS — M48.02 SPINAL STENOSIS IN CERVICAL REGION: ICD-10-CM

## 2021-02-24 DIAGNOSIS — I10 ESSENTIAL HYPERTENSION: ICD-10-CM

## 2021-02-24 DIAGNOSIS — E78.1 HYPERTRIGLYCERIDEMIA: ICD-10-CM

## 2021-02-24 PROCEDURE — 99214 OFFICE O/P EST MOD 30 MIN: CPT | Performed by: INTERNAL MEDICINE

## 2021-02-24 RX ORDER — CELECOXIB 200 MG/1
200 CAPSULE ORAL DAILY
COMMUNITY

## 2021-02-24 NOTE — PROGRESS NOTES
Lindy Caballero is a 72 y.o. male evaluated via audio only technology on 2021. Consent: He and/or his health care decision maker is aware that he may receive a bill for this audio only encounter, depending on his insurance coverage, and has provided verbal consent to proceed: Yes    I communicated with the patient and/or health care decision maker about the nature and details of the followin  Subjective:   Lindy Caballero is a 72 y.o. male who was seen for Diabetes (4 month f.u )    SUBJECTIVE:   Mr. Lindy Caballero is a 72 y.o. W male who is here for CPE; also follow up of routine medical issues. Chief Complaint   Patient presents with    Diabetes     4 month f.u        He has leg pain at night, L > R, \"they throb and ache. \"  Goes away when he gets up. He has been seeing Dr. Kamla Jensen, rheumatologist, for arthritis. Celebrex He has had issues with pain in R knee, elbow, thumb. He will see him again at 2:45 today. He was started on meloxicam, and \"That has helped everything but my spine. \"  \"The pain sometimes gets me up in the middle of the night. \"   Neck pain ongoing, but not as bad as the low back. Dr. Eagle Najera apparently has retired. We noted 4 months ago: It got so bad he went to see Dr. Eagle Najera, and was sent to PT. Last week he did dry needling and \"it made a big difference. \"  Pain still severe--\"I could hardly get up this morning. \"   Chronic low back pain: Pain generally is radiating especially down R leg. He now has a \"bump\" behind L knee, and \"it's rock hard\". Painless. He has a \"pea sized\" lump, soft and mobile, in the arch of one of his feet. It is recalled that he underwent C-spine fusion in , with Dr. Eagle Najera. Still, he gets tingling and throbbing at times, particularly after long days, or activity. Stress: Better since nursing home. He hasn't yet addressed the snoring. Dr. Eliu Tapia has treated him for bladder cancer, and administered BCG. Memory: Tested by Dr. Esdras Michelle in 2014, and no dementia. We noted before: He would like to have his memory checked. He has been a bit forgetful at times--noticing issues with short term recall, losing objects, forgetting the name of a coworker, etc.  His wife is concerned. At this time, he is otherwise doing well and has brought no other complaints to my attention today. For a list of the medical issues addressed today, see the assessment and plan below. PMH: His ophthalmologist is Tayo Smith  Past Medical History:   Diagnosis Date    Adopted     Arthritis     Went to see Dr. Rene Morocho 2013, and was told he has arthritis in the hands and shoulder.  Back pain     Bladder cancer ([facility] [address]     procedure6/2018    DM (diabetes mellitus) ([facility] [address] 10/18/2012    Hypertension     Hypertriglyceridemia 8/19/2014    Liver disease     hx hepatitis C 9 yrs. ago- treated    Malignant neoplasm of urinary bladder ([facility] [address] 9/13/2018    Shingles     Unspecified adverse effect of anesthesia     BLOOD PRESSURE WENT EXTREMILY HIGH DURING ANESTHESIA for lt rotator cuff 2009       PSH:   Past Surgical History:   Procedure Laterality Date    HX CERVICAL FUSION  2012    C3-C5; Dr. Lindsay Marrufo HX ORTHOPAEDIC  2009    Left Rotator cuff repair    HX ORTHOPAEDIC  7/2011    Right Rotator cuff repair    HX POLYPECTOMY  2011    Dr. Jasson Santiago: He is allergic to codeine; metformin; and zostavax [zoster vaccine live (pf)]. MEDS:   Current Outpatient Medications   Medication Sig    celecoxib (CeleBREX) 200 mg capsule Take 200 mg by mouth daily.  rosuvastatin (CRESTOR) 5 mg tablet Take 1 Tab by mouth daily. Replaces lipitor on 12/20/19    valsartan (Diovan) 160 mg tablet Take 1 Tab by mouth nightly.  cholecalciferol (VITAMIN D3) (1000 Units /25 mcg) tablet Take 1,000 Units by mouth daily.     acyclovir (ZOVIRAX) 5 % topical cream Apply  to affected area five (5) times daily. (Patient taking differently: Apply  to affected area as needed.)    pramoxine-hydrocortisone (PROTOFOAM-HC) 2.5-1 % topical cream Apply  to affected area three (3) times daily.  PREVIDENT 5000 PLUS 1.1 % crea Use as directed    desonide (TRIDESILON) 0.05 % cream Apply  to affected area two (2) times a day.  aspirin 81 mg tablet Take 81 mg by mouth daily.  meloxicam (MOBIC) 15 mg tablet TAKE 1 TABLET BY MOUTH EVERY DAY    ketoconazole (NIZORAL) 2 % shampoo Apply  to affected area as needed.  ipratropium-albuterol (COMBIVENT RESPIMAT)  mcg/actuation inhaler Take 1 Puff by inhalation every six (6) hours as needed for Wheezing. No current facility-administered medications for this visit. FH: Adopted. SH: He is a retired . He reports that he quit smoking about 23 years ago. His smoking use included cigarettes. He has a 30.00 pack-year smoking history. He has never used smokeless tobacco. He reports current alcohol use. He reports that he does not use drugs. ROS: See above; Complete ROS otherwise negative. OBJECTIVE:   Vitals: There were no vitals taken for this visit. Lab Results   Component Value Date/Time    Hemoglobin A1c 6.6 (H) 02/11/2021 08:00 AM       Lab Results   Component Value Date/Time    Cholesterol, total 125 02/11/2021 08:00 AM    HDL Cholesterol 51 02/11/2021 08:00 AM    LDL-CHOLESTEROL 48 02/11/2021 08:00 AM    LDL, calculated 38 02/27/2020 09:51 AM    VLDL, calculated 38 02/27/2020 09:51 AM    Triglyceride 179 (H) 02/11/2021 08:00 AM    Cholesterol/HDL ratio 2.5 02/11/2021 08:00 AM       ASSESSMENT/ PLAN:    1. Diabetes mellitus: Controlled at last check. Continue metformin bid. 2. Bony lesion L tibia: X-ray showed benign exostosis. 3. Dizziness: Sounds like orthostatic hypotension. Discussed self care. 4. Sciatica: Ongoing. Following with spine doctor (Will need to find a replacement for Dr. Tierra Jimenez).   Also rheumatologist, Dr. French Kidney. 5. Neck pain: PT. Follow up w/spine specialist.   6. Dyslipidemia: Labs okay on crestor. Discussed previously role of statin and low dose aspirin for cardiac protection. DM confers high CV risk. 7. History of Hepatitis C s/p treatment: CMP shows transaminases back to normal; recheck periodically. 8. Low libido: Not discussed today. 9. Memory disturbance: Stable. No dementia per neuropsych testing. 10. Snoring: Referred prev to orthodontist.  11. Urinary frequency and incontinence: Now on vesicare. Seeing urologist.   12. Bladder cancer, s/p BCG per Dr. Alexsander Mayo. I have reviewed the patient's medications and risks/side effects/benefits were discussed. Diagnosis(-es) explained to patient and questions answered. Literature provided where appropriate. Follow up 4 months DM          I affirm this is a Patient-Initiated Episode with a Patient who has not had a related appointment within my department in the past 7 days or scheduled within the next 24 hours.     Total Time: minutes: 11-20 minutes    Note: not billable if this call serves to triage the patient into an appointment for the relevant concern      Benitez Lincoln MD

## 2021-03-15 DIAGNOSIS — M48.02 SPINAL STENOSIS IN CERVICAL REGION: Primary | ICD-10-CM

## 2021-03-16 NOTE — TELEPHONE ENCOUNTER
----- Message from Erika Carpenter sent at 3/16/2021  9:49 AM EDT -----  Regarding: Dr. Keesha Lance  General Message/Vendor Calls    Caller's first and last name: Pt       Reason for call: checking status of refill request and request for poison ivy cream. Patient understands pain medication may take a few days but really needs poison ivy cream.        Callback required yes/no and why: yes, to confirm      Best contact number(s): [phone]      Details to clarify the request: N/A      Message from [facility]

## 2021-03-17 RX ORDER — OXYCODONE HYDROCHLORIDE 15 MG/1
15 TABLET ORAL
Status: CANCELLED | OUTPATIENT
Start: 2021-03-17

## 2021-03-17 RX ORDER — OXYCODONE HYDROCHLORIDE 5 MG/1
5 TABLET ORAL
Qty: 60 TAB | Refills: 0 | Status: SHIPPED | OUTPATIENT
Start: 2021-03-17 | End: 2021-04-12 | Stop reason: SDUPTHER

## 2021-04-12 DIAGNOSIS — M48.02 SPINAL STENOSIS IN CERVICAL REGION: ICD-10-CM

## 2021-04-12 RX ORDER — OXYCODONE HYDROCHLORIDE 5 MG/1
5 TABLET ORAL
Qty: 60 TAB | Refills: 0 | Status: SHIPPED | OUTPATIENT
Start: 2021-04-12 | End: 2021-06-23 | Stop reason: SDUPTHER

## 2021-04-12 RX ORDER — ROSUVASTATIN CALCIUM 5 MG/1
5 TABLET, COATED ORAL DAILY
Qty: 90 TAB | Refills: 3 | Status: SHIPPED | OUTPATIENT
Start: 2021-04-12 | End: 2021-08-06 | Stop reason: SDUPTHER

## 2021-04-12 NOTE — TELEPHONE ENCOUNTER
From patient:   Dr Ashli Paige I just sent in a request for my oxycodine prescription to be refiled on my chart. I was pulling ana maría out of a tree last week and they suddenly gave way and I fell pretty hard on my back. I am trying to use the pain pills sparingly but find that I am having to use more to moderate my back pain from this fall. I am not out but have only a few left.   If you would refill this as soon as possible I would appreciate it

## 2021-06-23 ENCOUNTER — VIRTUAL VISIT (OUTPATIENT)
Dept: INTERNAL MEDICINE CLINIC | Age: 71
End: 2021-06-23
Payer: COMMERCIAL

## 2021-06-23 DIAGNOSIS — E11.9 TYPE 2 DIABETES MELLITUS WITHOUT COMPLICATION, UNSPECIFIED WHETHER LONG TERM INSULIN USE (HCC): Primary | ICD-10-CM

## 2021-06-23 DIAGNOSIS — E78.1 HYPERTRIGLYCERIDEMIA: ICD-10-CM

## 2021-06-23 DIAGNOSIS — M48.02 SPINAL STENOSIS IN CERVICAL REGION: ICD-10-CM

## 2021-06-23 DIAGNOSIS — I10 ESSENTIAL HYPERTENSION: ICD-10-CM

## 2021-06-23 DIAGNOSIS — C67.9 MALIGNANT NEOPLASM OF URINARY BLADDER, UNSPECIFIED SITE (HCC): ICD-10-CM

## 2021-06-23 PROCEDURE — 99214 OFFICE O/P EST MOD 30 MIN: CPT | Performed by: INTERNAL MEDICINE

## 2021-06-23 RX ORDER — OXYCODONE HYDROCHLORIDE 5 MG/1
5 TABLET ORAL
Qty: 60 TABLET | Refills: 0 | Status: SHIPPED | OUTPATIENT
Start: 2021-06-23 | End: 2021-08-05 | Stop reason: SDUPTHER

## 2021-06-23 NOTE — PROGRESS NOTES
Merary Vann is a 72 y.o. male  Chief Complaint   Patient presents with    Follow-up     1. Have you been to the ER, urgent care clinic since your last visit? Hospitalized since your last visit?no    2. Have you seen or consulted any other health care providers outside of the [address] [place] [address] since your last visit? Include any pap smears or colon screening. No  Health Maintenance   Topic Date Due    DTaP/Tdap/Td series (1 - Tdap) Never done    Shingrix Vaccine Age 50> (1 of 2) Never done    Foot Exam Q1  01/31/2018    Eye Exam Retinal or Dilated  08/16/2018    Pneumococcal 65+ years (2 of 2 - PPSV23) 01/23/2020    MICROALBUMIN Q1  06/18/2021    A1C test (Diabetic or Prediabetic)  02/11/2022    Lipid Screen  02/11/2022    Colorectal Cancer Screening Combo  03/14/2022    Hepatitis C Screening  Completed    AAA Screening 73-69 YO Male Smoking Patients  Completed    Flu Vaccine  Completed    COVID-19 Vaccine  Completed     There were no vitals taken for this visit.

## 2021-06-23 NOTE — PROGRESS NOTES
Edmund Gray is a 72 y.o. male evaluated via audio-video technology on 2021. Consent: He and/or his health care decision maker is aware that he may receive a bill for this audio only encounter, depending on his insurance coverage, and has provided verbal consent to proceed: Yes    I communicated with the patient and/or health care decision maker about the nature and details of the followin  Subjective:   Edmund Gray is a 72 y.o. male who was seen for Follow-up    SUBJECTIVE:   Mr. Edmund Gray is a 72 y.o. W male who is here for CPE; also follow up of routine medical issues. Chief Complaint   Patient presents with    Follow-up       He is going to PT for shoulder pain    Back to work part time. He has some leg pain, that occurs when he is driving. He also still has leg pain at night, L > R, \"they throb and ache. \"  Goes away when he gets up. He has been seeing Dr. Jeff Huffman, rheumatologist, for arthritis. On Celebrex. He has had issues with pain in R knee, elbow, thumb. He will see him again at 2:45 today. He was started on meloxicam, and \"That has helped everything but my spine. \"  \"The pain sometimes gets me up in the middle of the night. \"     Dr. Gerard Mcleod apparently has retired. We noted 2020: It got so bad he went to see Dr. Gerard Mcleod, and was sent to PT. Last week he did dry needling and \"it made a big difference. \"  Pain still severe--\"I could hardly get up this morning. \"     Chronic low back pain: Pain generally is radiating especially down R leg. It is recalled that he underwent C-spine fusion in , with Dr. Gerard Mcleod. Still, he gets tingling and throbbing at times, particularly after long days, or activity. Stress: Better since [facility]. Dr. Valeria Wade has treated him for bladder cancer, and administered BCG. Now seeing him yearly. Memory: Tested by Dr. Annabelle Louis in , and no dementia. We noted before:  He would like to have his memory checked. He has been a bit forgetful at times--noticing issues with short term recall, losing objects, forgetting the name of a coworker, etc.  His wife is concerned. At this time, he is otherwise doing well and has brought no other complaints to my attention today. For a list of the medical issues addressed today, see the assessment and plan below. PMH: His ophthalmologist is Yossi Lucero  Past Medical History:   Diagnosis Date    Adopted     Arthritis     Went to see Dr. Lowell Rodriguez 2013, and was told he has arthritis in the hands and shoulder.  Back pain     Bladder [facility] ([facility] [address]     procedure6/2018    DM (diabetes mellitus) ([facility] [address] 10/18/2012    Hypertension     Hypertriglyceridemia 8/19/2014    Liver disease     hx hepatitis C 9 yrs. ago- treated    Malignant neoplasm of urinary bladder ([facility] [address] 9/13/2018    Shingles     Unspecified adverse effect of anesthesia     BLOOD PRESSURE WENT EXTREMILY HIGH DURING ANESTHESIA for lt rotator cuff 2009       PSH: Last colonoscopy in 2017; repeat 10 years. Past Surgical History:   Procedure Laterality Date    HX CERVICAL FUSION  2012    C3-C5; Dr. Too Barakat HX ORTHOPAEDIC  2009    Left Rotator cuff repair    HX ORTHOPAEDIC  7/2011    Right Rotator cuff repair    HX POLYPECTOMY  2011    Dr. Cantrell Level: He is allergic to codeine, metformin, and zostavax [zoster vaccine live (pf)]. MEDS:   Current Outpatient Medications   Medication Sig    rosuvastatin (CRESTOR) 5 mg tablet Take 1 Tab by mouth daily. Replaces lipitor on 12/20/19    celecoxib (CeleBREX) 200 mg capsule Take 200 mg by mouth daily.  valsartan (Diovan) 160 mg tablet Take 1 Tab by mouth nightly.  ketoconazole (NIZORAL) 2 % shampoo Apply  to affected area as needed.  acyclovir (ZOVIRAX) 5 % topical cream Apply  to affected area five (5) times daily.  (Patient taking differently: Apply  to affected area as needed.)    pramoxine-hydrocortisone (PROTOFOAM-HC) 2.5-1 % topical cream Apply  to affected area three (3) times daily.  PREVIDENT 5000 PLUS 1.1 % crea Use as directed    desonide (TRIDESILON) 0.05 % cream Apply  to affected area two (2) times a day.  aspirin 81 mg tablet Take 81 mg by mouth daily.  meloxicam (MOBIC) 15 mg tablet TAKE 1 TABLET BY MOUTH EVERY DAY    cholecalciferol (VITAMIN D3) (1000 Units /25 mcg) tablet Take 1,000 Units by mouth daily. (Patient not taking: Reported on 6/23/2021)    ipratropium-albuterol (COMBIVENT RESPIMAT)  mcg/actuation inhaler Take 1 Puff by inhalation every six (6) hours as needed for Wheezing. No current facility-administered medications for this visit. FH: Adopted. SH: He is a semi-retired . He reports that he quit smoking about 23 years ago. His smoking use included cigarettes. He has a 30.00 pack-year smoking history. He has never used smokeless tobacco. He reports current alcohol use. He reports that he does not use drugs. ROS: See above; Complete ROS otherwise negative. OBJECTIVE:   Vitals: There were no vitals taken for this visit. Lab Results   Component Value Date/Time    Hemoglobin A1c 6.6 (H) 02/11/2021 08:00 AM       Lab Results   Component Value Date/Time    Cholesterol, total 125 02/11/2021 08:00 AM    HDL Cholesterol 51 02/11/2021 08:00 AM    LDL-CHOLESTEROL 48 02/11/2021 08:00 AM    LDL, calculated 38 02/27/2020 09:51 AM    VLDL, calculated 38 02/27/2020 09:51 AM    Triglyceride 179 (H) 02/11/2021 08:00 AM    Cholesterol/HDL ratio 2.5 02/11/2021 08:00 AM       ASSESSMENT/ PLAN:    1. Diabetes mellitus: Controlled at last check. Continue metformin bid. 2. Sciatica: Ongoing. Following with spine doctor (Will need to find a replacement for Dr. Laurel Martini). Also rheumatologist, Dr. Epifanio Gerard. 3. Neck pain: s/p PT. Follow up w/spine specialist.   4. Dyslipidemia: Labs okay on crestor.  Discussed previously role of statin and low dose aspirin for cardiac protection. DM confers high CV risk. 5. History of Hepatitis C s/p treatment: CMP shows transaminases back to normal; recheck periodically. 6. Low libido: Not discussed today. 7. Memory disturbance: Stable. No dementia per neuropsych testing. 8. Snoring: Referred prev to orthodontist.  9. Urinary frequency and incontinence: Now on vesicare. Seeing urologist.   10. Bladder cancer, s/p BCG per Dr. Bernice Dubin. I have reviewed the patient's medications and risks/side effects/benefits were discussed. Diagnosis(-es) explained to patient and questions answered. Literature provided where appropriate. Follow up 4 months DM          Objective:   No flowsheet data found.      [INSTRUCTIONS:  \"[x]\" Indicates a positive item  \"[]\" Indicates a negative item  -- DELETE ALL ITEMS NOT EXAMINED]    Constitutional: [x] Appears well-developed and well-nourished [x] No apparent distress      [] Abnormal -     Mental status: [x] Alert and awake  [x] Oriented to person/place/time [x] Able to follow commands    [] Abnormal -     Eyes:   EOM    [x]  Normal    [] Abnormal -   Sclera  [x]  Normal    [] Abnormal -          Discharge [x]  None visible   [] Abnormal -     HENT: [x] Normocephalic, atraumatic  [] Abnormal -   [x] Mouth/Throat: Mucous membranes are moist    External Ears [x] Normal  [] Abnormal -    Neck: [x] No visualized mass [] Abnormal -     Pulmonary/Chest: [x] Respiratory effort normal   [x] No visualized signs of difficulty breathing or respiratory distress        [] Abnormal -      Musculoskeletal:   [x] Normal gait with no signs of ataxia         [x] Normal range of motion of neck        [] Abnormal -     Neurological:        [x] No Facial Asymmetry (Cranial nerve 7 motor function) (limited exam due to video visit)          [x] No gaze palsy        [] Abnormal -          Skin:        [x] No significant exanthematous lesions or discoloration noted on facial skin [] Abnormal -            Psychiatric:       [x] Normal Affect [] Abnormal -       Other pertinent observable physical exam findings:-        We discussed the expected course, resolution and complications of the diagnosis(es) in detail. Medication risks, benefits, costs, interactions, and alternatives were discussed as indicated. I advised him to contact the office if his condition worsens, changes or fails to improve as anticipated. He expressed understanding with the diagnosis(es) and plan. Marlene Rosales, who was evaluated through a patient-initiated, synchronous (real-time) audio-video encounter, and/or his healthcare decision maker, is aware that it is a billable service, with coverage as determined by his insurance carrier. He provided verbal consent to proceed: YES, and patient identification was verified. It was conducted pursuant to the emergency declaration under the [address] [place] [address], [address] [place] [address] authority and the [employer] General Act. A caregiver was present when appropriate. Ability to conduct physical exam was limited. I was in office. The patient was at home or otherwise outside the office.       Angie Tolbert MD

## 2021-08-05 DIAGNOSIS — M48.02 SPINAL STENOSIS IN CERVICAL REGION: ICD-10-CM

## 2021-08-05 RX ORDER — OXYCODONE HYDROCHLORIDE 5 MG/1
5 TABLET ORAL
Qty: 60 TABLET | Refills: 0 | Status: SHIPPED | OUTPATIENT
Start: 2021-08-05 | End: 2021-08-19

## 2021-08-05 NOTE — TELEPHONE ENCOUNTER
Future Appointments:  No future appointments. Last Appointment With Me:  6/23/2021     Requested Prescriptions     Pending Prescriptions Disp Refills    oxyCODONE IR (ROXICODONE) 5 mg immediate release tablet 60 Tablet 0     Sig: Take 1 Tablet by mouth every four (4) hours as needed for Pain for up to 14 days. Max Daily Amount: 30 mg.

## 2021-08-05 NOTE — TELEPHONE ENCOUNTER
Patient states he needs refill done due to Fall Last week. Patient states he was seen by Ortho that should show in system. Please call if any questions. Thank you      [facility].

## 2021-08-06 RX ORDER — ROSUVASTATIN CALCIUM 5 MG/1
5 TABLET, COATED ORAL DAILY
Qty: 90 TABLET | Refills: 3 | Status: SHIPPED | OUTPATIENT
Start: 2021-08-06 | End: 2022-08-11

## 2021-08-06 NOTE — TELEPHONE ENCOUNTER
Future Appointments:  No future appointments. Last Appointment With Me:  6/23/2021     Requested Prescriptions     Pending Prescriptions Disp Refills    rosuvastatin (CRESTOR) 5 mg tablet 90 Tablet 3     Sig: Take 1 Tablet by mouth daily.  Replaces lipitor on 12/20/19

## 2021-08-13 LAB
ALB/GLOBRATIO, 58C: 2 (CALC) (ref 1–2.5)
ALBUMIN SERPL-MCNC: 4.7 G/DL (ref 3.6–5.1)
ALKALINE PHOSPHATASE, TOTAL, 25002000: 53 U/L (ref 35–144)
ALT SERPL-CCNC: 32 U/L (ref 9–46)
AST SERPL W P-5'-P-CCNC: 29 U/L (ref 10–35)
BASOPHILS # BLD: 63 CELLS/UL (ref 0–200)
BASOPHILS NFR BLD: 1.1 %
BILIRUB SERPL-MCNC: 0.7 MG/DL (ref 0.2–1.2)
BUN SERPL-MCNC: 13 MG/DL (ref 7–25)
BUN/CREATININE RATIO,BUCR: ABNORMAL (CALC) (ref 6–22)
CALCIUM SERPL-MCNC: 9.3 MG/DL (ref 8.6–10.3)
CHLORIDE SERPL-SCNC: 103 MMOL/L (ref 98–110)
CHOL/HDL RATIO,CHHDX: 2.6 (CALC)
CHOLEST SERPL-MCNC: 134 MG/DL
CO2 SERPL-SCNC: 27 MMOL/L (ref 20–32)
CREAT SERPL-MCNC: 0.76 MG/DL (ref 0.7–1.25)
CREATININE URINE,9612018: 117 MG/DL (ref 20–320)
EAG (MG/DL),9916804: 151 (CALC)
EAG (MMOL/L),9916805: 8.4 (CALC)
EOSINOPHIL # BLD: 239 CELLS/UL (ref 15–500)
EOSINOPHIL NFR BLD: 4.2 %
ERYTHROCYTE [DISTWIDTH] IN BLOOD BY AUTOMATED COUNT: 13.2 % (ref 11–15)
GLOBULIN,GLOB: 2.3 G/DL (CALC) (ref 1.9–3.7)
GLUCOSE SERPL-MCNC: 125 MG/DL (ref 65–99)
HBA1C MFR BLD HPLC: 6.9 % OF TOTAL HGB
HCT VFR BLD AUTO: 47.4 % (ref 38.5–50)
HDLC SERPL-MCNC: 52 MG/DL
HGB BLD-MCNC: 15.8 G/DL (ref 13.2–17.1)
LDL-CHOLESTEROL: 57 MG/DL (CALC)
LYMPHOCYTES # BLD: 1727 CELLS/UL (ref 850–3900)
LYMPHOCYTES NFR BLD: 30.3 %
MCH RBC QN AUTO: 31.5 PG (ref 27–33)
MCHC RBC AUTO-ENTMCNC: 33.3 G/DL (ref 32–36)
MCV RBC AUTO: 94.6 FL (ref 80–100)
MICROALBUMIN,URINE RANDOM 140054: 0.6 MG/DL
MICROALBUMIN/CREAT RATIO: 5 MCG/MG CREAT
MONOCYTES # BLD: 433 CELLS/UL (ref 200–950)
MONOCYTES NFR BLD: 7.6 %
NEUTROPHILS # BLD AUTO: 3238 CELLS/UL (ref 1500–7800)
NEUTROPHILS # BLD: 56.8 %
NON-HDL CHOLESTEROL, 011976: 82 MG/DL (CALC)
PLATELET # BLD AUTO: NORMAL 10*3/UL
POTASSIUM SERPL-SCNC: 4.4 MMOL/L (ref 3.5–5.3)
PROT SERPL-MCNC: 7 G/DL (ref 6.1–8.1)
RBC # BLD AUTO: 5.01 MILLION/UL (ref 4.2–5.8)
SODIUM SERPL-SCNC: 138 MMOL/L (ref 135–146)
TRIGL SERPL-MCNC: 186 MG/DL (ref ?–150)
WBC # BLD AUTO: 5.7 THOUSAND/UL (ref 3.8–10.8)

## 2021-08-20 DIAGNOSIS — M54.9 BACK PAIN, UNSPECIFIED BACK LOCATION, UNSPECIFIED BACK PAIN LATERALITY, UNSPECIFIED CHRONICITY: Primary | ICD-10-CM

## 2021-08-20 RX ORDER — VALSARTAN 160 MG/1
TABLET ORAL
Qty: 90 TABLET | Refills: 3 | Status: SHIPPED | OUTPATIENT
Start: 2021-08-20 | End: 2022-08-19 | Stop reason: SDUPTHER

## 2021-08-23 ENCOUNTER — TELEPHONE (OUTPATIENT)
Dept: INTERNAL MEDICINE CLINIC | Age: 71
End: 2021-08-23

## 2021-08-23 DIAGNOSIS — M25.512 LEFT SHOULDER PAIN, UNSPECIFIED CHRONICITY: Primary | ICD-10-CM

## 2021-08-23 NOTE — TELEPHONE ENCOUNTER
#[phone]  EXT [address] Sandar Glass PT at [facility] needs an order faxed over today for PT. New order for PT of left shoulder. Pt has an appt tomorrow, 8-24-21 and they will need today.     Fax #[phone]

## 2021-09-01 ENCOUNTER — DOCUMENTATION ONLY (OUTPATIENT)
Dept: INTERNAL MEDICINE CLINIC | Age: 71
End: 2021-09-01

## 2021-09-01 NOTE — PROGRESS NOTES
Received medical record request from [address] [place] [address] on 9/1/21  Faxed request to [facility] on 9/1/21 and scanned in chart

## 2021-09-14 ENCOUNTER — PATIENT MESSAGE (OUTPATIENT)
Dept: INTERNAL MEDICINE CLINIC | Age: 71
End: 2021-09-14

## 2021-09-14 DIAGNOSIS — M48.02 SPINAL STENOSIS IN CERVICAL REGION: Primary | ICD-10-CM

## 2021-09-14 RX ORDER — OXYCODONE HYDROCHLORIDE 5 MG/1
5 TABLET ORAL
Qty: 60 TABLET | Refills: 0 | Status: SHIPPED | OUTPATIENT
Start: 2021-09-14 | End: 2021-09-28

## 2021-09-14 NOTE — TELEPHONE ENCOUNTER
From: Anila Stephens  To: Cooper Burns MD  Sent: 9/14/2021 12:26 AM EDT  Subject: Referral Joey Erickson    I am doing ok but would like for you to refill my prescription for oxycontin please, I am almost out. I hope your [place] the mountains was relaxing and completely enjoyable.  [employer]

## 2021-09-14 NOTE — TELEPHONE ENCOUNTER
PCP: Irene Pulido MD    Last appt: 6/23/2021  No future appointments. Requested Prescriptions     Pending Prescriptions Disp Refills    oxyCODONE IR (ROXICODONE) 5 mg immediate release tablet 60 Tablet 0     Sig: Take 1 Tablet by mouth every four (4) hours as needed for Pain for up to 14 days. Max Daily Amount: 30 mg.

## 2021-10-11 ENCOUNTER — PATIENT MESSAGE (OUTPATIENT)
Dept: INTERNAL MEDICINE CLINIC | Age: 71
End: 2021-10-11

## 2021-10-11 DIAGNOSIS — M48.02 SPINAL STENOSIS IN CERVICAL REGION: Primary | ICD-10-CM

## 2021-10-12 RX ORDER — OXYCODONE HYDROCHLORIDE 5 MG/1
5 TABLET ORAL
Qty: 60 TABLET | Refills: 0 | Status: SHIPPED | OUTPATIENT
Start: 2021-10-12 | End: 2021-11-04 | Stop reason: SDUPTHER

## 2021-10-12 NOTE — TELEPHONE ENCOUNTER
From: Rosario Zendejas  To: Yohana Lal MD  Sent: 10/11/2021 10:34 PM EDT  Subject: Prescription Question    Good Morning Dr. Grant Gann I am doing ok and hope you are also. Would you please send in a refill request for my oxycodone please. My work life is happily full, (possibly to full right now) but, working is much better than   not. My back and shoulder pain is manageable and with medication I am able to continue. I am looking forward to a great fall season and hope you and your family are well and doing the same.     [employer]

## 2021-10-12 NOTE — TELEPHONE ENCOUNTER
PCP: Aleks Haley MD    Last appt: 6/23/2021  No future appointments. Requested Prescriptions     Pending Prescriptions Disp Refills    oxyCODONE IR (ROXICODONE) 5 mg immediate release tablet 60 Tablet 0     Sig: Take 1 Tablet by mouth every four (4) hours as needed for Pain for up to 14 days. Max Daily Amount: 30 mg. Pt is due for an appt. Sending to [facility] as well to make an appt.

## 2021-10-28 DIAGNOSIS — E11.9 TYPE 2 DIABETES MELLITUS WITHOUT COMPLICATION, UNSPECIFIED WHETHER LONG TERM INSULIN USE (HCC): Primary | ICD-10-CM

## 2021-11-03 DIAGNOSIS — M48.02 SPINAL STENOSIS IN CERVICAL REGION: Primary | ICD-10-CM

## 2021-11-03 RX ORDER — OXYCODONE HYDROCHLORIDE 5 MG/1
TABLET ORAL
COMMUNITY
Start: 2021-04-12 | End: 2021-11-04 | Stop reason: SDUPTHER

## 2021-11-03 RX ORDER — OXYCODONE HYDROCHLORIDE 5 MG/1
5 TABLET ORAL
Qty: 30 TABLET | Refills: 0 | Status: CANCELLED | OUTPATIENT
Start: 2021-11-03 | End: 2021-12-03

## 2021-11-03 NOTE — TELEPHONE ENCOUNTER
----- Message from Tera Swetha sent at 11/3/2021  5:35 AM EDT -----  Regarding: Prescription Question  Contact: [phone]  Good Morning Dr Mendy Russo. I have been up most of the night with back pain and I have 4 pain pills left which I am trying to save. Would you please send in a refill request for this please. We are having a virtual appointment Friday morning and I m a little concerned that I am having to take more of these pain pills to get where I use to be comfortable with less in the past. I always go a few days at some period during the month without taking these so my body can adjust. The only side effects I seem to have is trouble sleeping for a few days so I guess I may be concerned about nothing.  [employer]

## 2021-11-04 ENCOUNTER — PATIENT MESSAGE (OUTPATIENT)
Dept: INTERNAL MEDICINE CLINIC | Age: 71
End: 2021-11-04

## 2021-11-04 DIAGNOSIS — M48.02 SPINAL STENOSIS IN CERVICAL REGION: Primary | ICD-10-CM

## 2021-11-04 RX ORDER — OXYCODONE HYDROCHLORIDE 5 MG/1
5 TABLET ORAL
Status: CANCELLED | OUTPATIENT
Start: 2021-11-04

## 2021-11-04 RX ORDER — OXYCODONE HYDROCHLORIDE 5 MG/1
5 TABLET ORAL
Qty: 60 TABLET | Refills: 0 | Status: SHIPPED | OUTPATIENT
Start: 2021-11-04 | End: 2021-11-05

## 2021-11-04 RX ORDER — SODIUM FLUORIDE 5 MG/ML
PASTE, DENTIFRICE DENTAL
Qty: 51 G | Refills: 1 | Status: SHIPPED | OUTPATIENT
Start: 2021-11-04

## 2021-11-04 NOTE — TELEPHONE ENCOUNTER
PCP: Deangelo Alexander MD    Last appt: 6/23/2021  Future Appointments   Date Time Provider Sobia Cullen   11/5/2021 11:30 AM Deangelo Alexander MD [facility]       Requested Prescriptions     Pending Prescriptions Disp Refills    oxyCODONE IR (ROXICODONE) 5 mg immediate release tablet       Sig: Take 1 Tablet by mouth.        Prior labs and Blood pressures:  BP Readings from Last 3 Encounters:   02/27/20 113/67   12/20/19 110/70   10/23/19 114/65     Lab Results   Component Value Date/Time    Sodium 138 08/11/2021 08:34 AM    Potassium 4.4 08/11/2021 08:34 AM    Chloride 103 08/11/2021 08:34 AM    CO2 27 08/11/2021 08:34 AM    Anion gap 10 10/12/2012 08:45 AM    Glucose 125 (H) 08/11/2021 08:34 AM    BUN 13 08/11/2021 08:34 AM    Creatinine 0.76 08/11/2021 08:34 AM    BUN/Creatinine ratio NOT APPLICABLE 64/56/3276 29:93 AM    GFR est  08/11/2021 08:34 AM    GFR est non-AA 93 08/11/2021 08:34 AM    Calcium 9.3 08/11/2021 08:34 AM     Lab Results   Component Value Date/Time    Hemoglobin A1c 6.9 (H) 08/11/2021 08:34 AM     Lab Results   Component Value Date/Time    Cholesterol, total 134 08/11/2021 08:34 AM    HDL Cholesterol 52 08/11/2021 08:34 AM    LDL-CHOLESTEROL 57 08/11/2021 08:34 AM    LDL, calculated 38 02/27/2020 09:51 AM    VLDL, calculated 38 02/27/2020 09:51 AM    Triglyceride 186 (H) 08/11/2021 08:34 AM    Cholesterol/HDL ratio 2.6 08/11/2021 08:34 AM     No results found for: Younorberta Donnell, XQVID3, XQVID, VD3RIA    No results found for: TSH, TSH2, TSH3, TSHP, TSHEXT

## 2021-11-05 ENCOUNTER — VIRTUAL VISIT (OUTPATIENT)
Dept: INTERNAL MEDICINE CLINIC | Age: 71
End: 2021-11-05
Payer: COMMERCIAL

## 2021-11-05 DIAGNOSIS — M54.50 RIGHT-SIDED LOW BACK PAIN WITHOUT SCIATICA, UNSPECIFIED CHRONICITY: ICD-10-CM

## 2021-11-05 DIAGNOSIS — M48.02 SPINAL STENOSIS IN CERVICAL REGION: ICD-10-CM

## 2021-11-05 PROCEDURE — 99441 PR PHYS/QHP TELEPHONE EVALUATION 5-10 MIN: CPT | Performed by: INTERNAL MEDICINE

## 2021-11-05 RX ORDER — OXYCODONE HYDROCHLORIDE 5 MG/1
5 TABLET ORAL
Qty: 60 TABLET | Refills: 0 | OUTPATIENT
Start: 2021-11-05 | End: 2021-11-19

## 2021-11-05 RX ORDER — OXYCODONE HYDROCHLORIDE 5 MG/1
5 TABLET ORAL
Qty: 60 TABLET | Refills: 0 | Status: SHIPPED | OUTPATIENT
Start: 2021-11-05 | End: 2021-11-23 | Stop reason: SDUPTHER

## 2021-11-05 RX ORDER — CYCLOBENZAPRINE HCL 10 MG
10 TABLET ORAL
Qty: 30 TABLET | Refills: 1 | Status: SHIPPED | OUTPATIENT
Start: 2021-11-05 | End: 2022-04-15

## 2021-11-05 NOTE — TELEPHONE ENCOUNTER
Patient called back in reference to prescription that wasn't received from yesterday. Please call when corrected.  Thank you

## 2021-11-05 NOTE — PROGRESS NOTES
Randy Barraza is a 72 y.o. male  Chief Complaint   Patient presents with    Follow-up     4 month    Diabetes     Health Maintenance Due   Topic Date Due    DTaP/Tdap/Td series (1 - Tdap) Never done    Shingrix Vaccine Age 50> (1 of 2) Never done    Foot Exam Q1  01/31/2018    Eye Exam Retinal or Dilated  08/16/2018    Pneumococcal 65+ years (2 of 2 - PPSV23) 01/23/2020    Flu Vaccine (1) 09/01/2021     There were no vitals taken for this visit.     Patient-Reported Vitals 11/5/2021   Patient-Reported Weight 172lb   Patient-Reported Height -   Patient-Reported Pulse -   Patient-Reported Temperature -   Patient-Reported SpO2 -   Patient-Reported Peak Flow -       Patient Phone Number/Email:  [phone]

## 2021-11-05 NOTE — TELEPHONE ENCOUNTER
Patient states he is at Pharmacy now & Prescription done yesterday for oxyCODONE IR (ROXICODONE) 5 mg immediate release tablet has not been received & Patient is at Pharmacy now. Please call Pharmacy & then Patient.  Thank you

## 2021-11-05 NOTE — PROGRESS NOTES
Barbara Winslow is a 72 y.o. male evaluated via audio-only technology on 2021. Consent: He and/or his health care decision maker is aware that he may receive a bill for this audio only encounter, depending on his insurance coverage, and has provided verbal consent to proceed: Yes    I communicated with the patient and/or health care decision maker about the nature and details of the followin  Subjective:   Barbara Winslow is a 72 y.o. male who was seen for Follow-up (4 month) and Diabetes    SUBJECTIVE:   Mr. Barbara Winslow is a 72 y.o. W male who is here for follow up of routine medical issues. Chief Complaint   Patient presents with    Follow-up     4 month    Diabetes       His back was doing okay, but then he pulled something. \"It has just gotten worse and worse. \" Hard to get comfortable in bed. \"I have trouble walking to my toilet. \" He called Dr. Chris Anguiano to try to get in. Appt Dec 1. He has some leg pain, that occurs when he is driving. He also still has leg pain at night, L > R, \"they throb and ache. \"  Goes away when he gets up. He has been seeing Dr. Tavon Robertson, rheumatologist, for arthritis. On Celebrex. He has had issues with pain in R knee, elbow, thumb. He will see him again at 2:45 today. He was started on meloxicam, and \"That has helped everything but my spine. \"  \"The pain sometimes gets me up in the middle of the night. \"     Dr. Trav Champion apparently has retired. We noted : It got so bad he went to see Dr. Trav Champion, and was sent to PT. Last week he did dry needling and \"it made a big difference. \"  Pain still severe--\"I could hardly get up this morning. \"     He has chronic low back pain: Pain generally is radiating especially down R leg. It is recalled that he underwent C-spine fusion in , with Dr. Trav Champion. Still, he gets tingling and throbbing at times, particularly after long days, or activity. Stress: Better since longterm. Though, working part time now--\"it's been really good for me. \"     Dr. Otoniel Mullen has treated him for bladder cancer, and administered BCG. Now seeing him yearly. Memory: Tested by Dr. Luis Manuel Carmona in 2014, and no dementia. We noted before: He would like to have his memory checked. He has been a bit forgetful at times--noticing issues with short term recall, losing objects, forgetting the name of a coworker, etc.  His wife is concerned. At this time, he is otherwise doing well and has brought no other complaints to my attention today. For a list of the medical issues addressed today, see the assessment and plan below. PMH: His ophthalmologist is Julia Hutchinson  Past Medical History:   Diagnosis Date    Adopted     Arthritis     Went to see Dr. Jaron Campbell 2013, and was told he has arthritis in the hands and shoulder.  Back pain     Bladder cancer ([facility] [address]     procedure6/2018    DM (diabetes mellitus) ([facility] [address] 10/18/2012    Hypertension     Hypertriglyceridemia 8/19/2014    Liver disease     hx hepatitis C 9 yrs. ago- treated    Malignant neoplasm of urinary bladder ([facility] [address] 9/13/2018    Shingles     Unspecified adverse effect of anesthesia     BLOOD PRESSURE WENT EXTREMILY HIGH DURING ANESTHESIA for lt rotator cuff 2009       PSH: Last colonoscopy in 2017; repeat 10 years. Past Surgical History:   Procedure Laterality Date    HX CERVICAL FUSION  2012    C3-C5; Dr. Montero Book HX ORTHOPAEDIC  2009    Left Rotator cuff repair    HX ORTHOPAEDIC  7/2011    Right Rotator cuff repair    HX POLYPECTOMY  2011    Dr. Thad Ortiz: He is allergic to codeine, metformin, and zostavax [zoster vaccine live (pf)]. MEDS:   Current Outpatient Medications   Medication Sig    PreviDent 5000 Plus 1.1 % crea USE AS DIRECTED    oxyCODONE IR (ROXICODONE) 5 mg immediate release tablet Take 1 Tablet by mouth every four (4) hours as needed for Pain for up to 14 days.  Max Daily Amount: 30 mg.    valsartan (DIOVAN) 160 mg tablet TAKE 1 TABLET NIGHTLY    rosuvastatin (CRESTOR) 5 mg tablet Take 1 Tablet by mouth daily. Replaces lipitor on 12/20/19    celecoxib (CeleBREX) 200 mg capsule Take 200 mg by mouth daily.  acyclovir (ZOVIRAX) 5 % topical cream Apply  to affected area five (5) times daily. (Patient taking differently: Apply  to affected area as needed.)    desonide (TRIDESILON) 0.05 % cream Apply  to affected area two (2) times a day.  aspirin 81 mg tablet Take 81 mg by mouth daily.  ketoconazole (NIZORAL) 2 % shampoo Apply  to affected area as needed. (Patient not taking: Reported on 11/5/2021)    pramoxine-hydrocortisone (PROTOFOAM-HC) 2.5-1 % topical cream Apply  to affected area three (3) times daily. (Patient not taking: Reported on 11/5/2021)     No current facility-administered medications for this visit. FH: Adopted. SH: He is a semi-retired . He reports that he quit smoking about 24 years ago. His smoking use included cigarettes. He has a 30.00 pack-year smoking history. He has never used smokeless tobacco. He reports current alcohol use. He reports that he does not use drugs. ROS: See above; Complete ROS otherwise negative. OBJECTIVE:   Vitals: There were no vitals taken for this visit. Lab Results   Component Value Date/Time    Hemoglobin A1c 6.9 (H) 08/11/2021 08:34 AM       Lab Results   Component Value Date/Time    Cholesterol, total 134 08/11/2021 08:34 AM    HDL Cholesterol 52 08/11/2021 08:34 AM    LDL-CHOLESTEROL 57 08/11/2021 08:34 AM    LDL, calculated 38 02/27/2020 09:51 AM    VLDL, calculated 38 02/27/2020 09:51 AM    Triglyceride 186 (H) 08/11/2021 08:34 AM    Cholesterol/HDL ratio 2.6 08/11/2021 08:34 AM       ASSESSMENT/ PLAN:    1. Diabetes mellitus: Controlled at last check. Continue metformin bid. 2. Acute low back pain: Plans to see Dr. Rina Reilly. Has oxycodone. For now, Rx for flexeril. 3. Sciatica: Ongoing. Following with spine doctor. Also rheumatologist, Dr. Roberto Kelley. 4. Neck pain: s/p PT. Follow up w/spine specialist.   5. Dyslipidemia: Labs okay on crestor. Discussed previously role of statin and low dose aspirin for cardiac protection. DM confers high CV risk. 6. History of Hepatitis C s/p treatment: CMP shows transaminases back to normal; recheck periodically. 7. Low libido: Not discussed today. 8. Memory disturbance: Stable. No dementia per neuropsych testing. 9. Snoring: Referred prev to orthodontist.  10. Urinary frequency and incontinence: Now on vesicare. Seeing urologist.   11. Bladder cancer, s/p BCG per Dr. Vivek Elise. I have reviewed the patient's medications and risks/side effects/benefits were discussed. Diagnosis(-es) explained to patient and questions answered. Literature provided where appropriate. Follow up 4 months DM          Objective:     Patient-Reported Vitals 11/5/2021   Patient-Reported Weight 172lb   Patient-Reported Height -   Patient-Reported Pulse -   Patient-Reported Temperature -   Patient-Reported SpO2 -   Patient-Reported Peak Flow -            Portia Marquez, who was evaluated through a patient-initiated, synchronous (real-time) audio only encounter, and/or her healthcare decision maker, is aware that it is a billable service, with coverage as determined by his insurance carrier. He provided verbal consent to proceed: Yes. He has not had a related appointment within my department in the past 7 days or scheduled within the next 24 hours. Time: 5-10 minutes.      Jerrell Sandhu MD

## 2021-11-08 DIAGNOSIS — M54.50 RIGHT LOW BACK PAIN, UNSPECIFIED CHRONICITY, UNSPECIFIED WHETHER SCIATICA PRESENT: Primary | ICD-10-CM

## 2021-11-10 LAB
ALB/GLOBRATIO, 58C: 1.8 (CALC) (ref 1–2.5)
ALBUMIN SERPL-MCNC: 4.8 G/DL (ref 3.6–5.1)
ALKALINE PHOSPHATASE, TOTAL, 25002000: 55 U/L (ref 35–144)
ALT SERPL-CCNC: 43 U/L (ref 9–46)
AST SERPL W P-5'-P-CCNC: 38 U/L (ref 10–35)
BILIRUB SERPL-MCNC: 0.8 MG/DL (ref 0.2–1.2)
BUN SERPL-MCNC: 16 MG/DL (ref 7–25)
BUN/CREATININE RATIO,BUCR: ABNORMAL (CALC) (ref 6–22)
CALCIUM SERPL-MCNC: 9.8 MG/DL (ref 8.6–10.3)
CHLORIDE SERPL-SCNC: 102 MMOL/L (ref 98–110)
CHOL/HDL RATIO,CHHDX: 2.3 (CALC)
CHOLEST SERPL-MCNC: 119 MG/DL
CO2 SERPL-SCNC: 27 MMOL/L (ref 20–32)
CREAT SERPL-MCNC: 0.79 MG/DL (ref 0.7–1.25)
EAG (MG/DL),9916804: 148 (CALC)
EAG (MMOL/L),9916805: 8.2 (CALC)
GLOBULIN,GLOB: 2.7 G/DL (CALC) (ref 1.9–3.7)
GLUCOSE SERPL-MCNC: 113 MG/DL (ref 65–99)
HBA1C MFR BLD HPLC: 6.8 %
HBA1C MFR BLD HPLC: 6.8 % OF TOTAL HGB
HDLC SERPL-MCNC: 51 MG/DL
LDL-CHOLESTEROL: 44 MG/DL (CALC)
NON-HDL CHOLESTEROL, 011976: 68 MG/DL (CALC)
POTASSIUM SERPL-SCNC: 5 MMOL/L (ref 3.5–5.3)
PROT SERPL-MCNC: 7.5 G/DL (ref 6.1–8.1)
SODIUM SERPL-SCNC: 138 MMOL/L (ref 135–146)
TRIGL SERPL-MCNC: 159 MG/DL (ref ?–150)

## 2021-11-23 DIAGNOSIS — M48.02 SPINAL STENOSIS IN CERVICAL REGION: ICD-10-CM

## 2021-11-23 RX ORDER — OXYCODONE HYDROCHLORIDE 5 MG/1
5 TABLET ORAL
Qty: 60 TABLET | Refills: 0 | Status: CANCELLED | OUTPATIENT
Start: 2021-11-23 | End: 2021-11-26

## 2021-11-23 RX ORDER — OXYCODONE HYDROCHLORIDE 5 MG/1
5 TABLET ORAL
Qty: 60 TABLET | Refills: 0 | Status: SHIPPED | OUTPATIENT
Start: 2021-11-23 | End: 2021-11-29 | Stop reason: SDUPTHER

## 2021-11-23 NOTE — TELEPHONE ENCOUNTER
----- Message from Corona Crespo sent at 11/23/2021  9:47 AM EST -----  Regarding: prescription refill  Hello Dr R [facility]  I will run out of pain medication in the next few days. I use to take these to soften the pain, Now I take them to be able to go up and down stairs and work some days. I am pinning my hopes on the MRI finding some type of treatment to try and get my life back. I will make sure the results from Dr Charlie Post and the MRI are sent to you. I hope you and your family have a wonderful holiday.  Advise Only

## 2021-11-23 NOTE — TELEPHONE ENCOUNTER
Future Appointments:  No future appointments. Last Appointment With Me:  11/5/2021     Requested Prescriptions     Pending Prescriptions Disp Refills    oxyCODONE IR (ROXICODONE) 5 mg immediate release tablet 60 Tablet 0     Sig: Take 1 Tablet by mouth every four (4) hours as needed for Pain for up to 3 days. Max Daily Amount: 30 mg.

## 2021-11-29 DIAGNOSIS — M48.02 SPINAL STENOSIS IN CERVICAL REGION: ICD-10-CM

## 2021-11-29 RX ORDER — OXYCODONE HYDROCHLORIDE 5 MG/1
5 TABLET ORAL
Qty: 60 TABLET | Refills: 0 | Status: SHIPPED | OUTPATIENT
Start: 2021-11-29 | End: 2021-12-20 | Stop reason: SDUPTHER

## 2021-11-29 NOTE — TELEPHONE ENCOUNTER
Future Appointments:  No future appointments. Last Appointment With Me:  11/5/2021     Requested Prescriptions     Pending Prescriptions Disp Refills    oxyCODONE IR (ROXICODONE) 5 mg immediate release tablet 60 Tablet 0     Sig: Take 1 Tablet by mouth every four (4) hours as needed for Pain for up to 14 days. Max Daily Amount: 30 mg.

## 2021-11-29 NOTE — TELEPHONE ENCOUNTER
----- Message from Jake Rubin sent at 11/29/2021  6:17 AM EST -----  Regarding: prescription refill  Dr Jovanny Shay Please refill my prescription for pain medication.  Thank [facility]

## 2021-12-15 ENCOUNTER — TRANSCRIBE ORDER (OUTPATIENT)
Dept: SCHEDULING | Age: 71
End: 2021-12-15

## 2021-12-17 ENCOUNTER — TRANSCRIBE ORDER (OUTPATIENT)
Dept: SCHEDULING | Age: 71
End: 2021-12-17

## 2021-12-17 DIAGNOSIS — M89.8X8 MASS OF SPINE: ICD-10-CM

## 2021-12-17 DIAGNOSIS — M54.16 LEFT LUMBAR RADICULITIS: Primary | ICD-10-CM

## 2021-12-20 DIAGNOSIS — M48.02 SPINAL STENOSIS IN CERVICAL REGION: ICD-10-CM

## 2021-12-20 RX ORDER — OXYCODONE HYDROCHLORIDE 5 MG/1
5 TABLET ORAL
Qty: 60 TABLET | Refills: 0 | Status: SHIPPED | OUTPATIENT
Start: 2021-12-20 | End: 2022-01-10 | Stop reason: SDUPTHER

## 2021-12-28 ENCOUNTER — HOSPITAL ENCOUNTER (OUTPATIENT)
Dept: MRI IMAGING | Age: 71
Discharge: HOME | End: 2021-12-28
Attending: PHYSICAL MEDICINE & REHABILITATION
Payer: COMMERCIAL

## 2021-12-28 VITALS — BODY MASS INDEX: 25.4 KG/M2 | WEIGHT: 172 LBS

## 2021-12-28 DIAGNOSIS — M54.16 LEFT LUMBAR RADICULITIS: ICD-10-CM

## 2021-12-28 DIAGNOSIS — M89.8X8 MASS OF SPINE: ICD-10-CM

## 2021-12-28 PROCEDURE — 72158 MRI LUMBAR SPINE W/O & W/DYE: CPT

## 2021-12-28 PROCEDURE — 74011250636 HC RX REV CODE- 250/636: Performed by: PHYSICAL MEDICINE & REHABILITATION

## 2021-12-28 PROCEDURE — A9575 INJ GADOTERATE MEGLUMI 0.1ML: HCPCS | Performed by: PHYSICAL MEDICINE & REHABILITATION

## 2021-12-28 RX ORDER — GADOTERATE MEGLUMINE 376.9 MG/ML
15 INJECTION INTRAVENOUS
Status: COMPLETED | OUTPATIENT
Start: 2021-12-28 | End: 2021-12-28

## 2021-12-28 RX ADMIN — GADOTERATE MEGLUMINE 15 ML: 376.9 INJECTION INTRAVENOUS at 12:00

## 2022-01-10 DIAGNOSIS — M48.02 SPINAL STENOSIS IN CERVICAL REGION: ICD-10-CM

## 2022-01-10 RX ORDER — OXYCODONE HYDROCHLORIDE 5 MG/1
5 TABLET ORAL
Qty: 60 TABLET | Refills: 0 | Status: SHIPPED | OUTPATIENT
Start: 2022-01-10 | End: 2022-01-31 | Stop reason: SDUPTHER

## 2022-01-31 DIAGNOSIS — M48.02 SPINAL STENOSIS IN CERVICAL REGION: ICD-10-CM

## 2022-01-31 RX ORDER — OXYCODONE HYDROCHLORIDE 5 MG/1
5 TABLET ORAL
Qty: 60 TABLET | Refills: 0 | Status: SHIPPED | OUTPATIENT
Start: 2022-01-31 | End: 2022-02-03 | Stop reason: SDUPTHER

## 2022-02-03 DIAGNOSIS — M48.02 SPINAL STENOSIS IN CERVICAL REGION: ICD-10-CM

## 2022-02-03 RX ORDER — OXYCODONE HYDROCHLORIDE 5 MG/1
5 TABLET ORAL
Qty: 60 TABLET | Refills: 0 | Status: SHIPPED | OUTPATIENT
Start: 2022-02-03 | End: 2022-03-14 | Stop reason: SDUPTHER

## 2022-03-14 DIAGNOSIS — M25.512 LEFT SHOULDER PAIN, UNSPECIFIED CHRONICITY: ICD-10-CM

## 2022-03-14 DIAGNOSIS — M48.02 SPINAL STENOSIS IN CERVICAL REGION: ICD-10-CM

## 2022-03-14 DIAGNOSIS — M25.511 RIGHT SHOULDER PAIN, UNSPECIFIED CHRONICITY: Primary | ICD-10-CM

## 2022-03-14 RX ORDER — OXYCODONE HYDROCHLORIDE 5 MG/1
5 TABLET ORAL
Qty: 60 TABLET | Refills: 0 | Status: SHIPPED | OUTPATIENT
Start: 2022-03-14 | End: 2022-03-31 | Stop reason: SDUPTHER

## 2022-03-19 PROBLEM — C67.9 MALIGNANT NEOPLASM OF URINARY BLADDER (HCC): Status: ACTIVE | Noted: 2018-09-13

## 2022-03-23 DIAGNOSIS — E11.9 TYPE 2 DIABETES MELLITUS WITHOUT COMPLICATION, UNSPECIFIED WHETHER LONG TERM INSULIN USE (HCC): Primary | ICD-10-CM

## 2022-03-28 ENCOUNTER — TELEPHONE (OUTPATIENT)
Dept: INTERNAL MEDICINE CLINIC | Age: 72
End: 2022-03-28

## 2022-03-28 DIAGNOSIS — E11.9 DIABETES MELLITUS WITHOUT COMPLICATION (HCC): Primary | ICD-10-CM

## 2022-03-31 DIAGNOSIS — M48.02 SPINAL STENOSIS IN CERVICAL REGION: ICD-10-CM

## 2022-03-31 RX ORDER — OXYCODONE HYDROCHLORIDE 5 MG/1
5 TABLET ORAL
Qty: 60 TABLET | Refills: 0 | Status: SHIPPED | OUTPATIENT
Start: 2022-03-31 | End: 2022-04-14

## 2022-04-12 LAB
ALB/GLOBRATIO, 58C: 2 (CALC) (ref 1–2.5)
ALBUMIN SERPL-MCNC: 4.7 G/DL (ref 3.6–5.1)
ALKALINE PHOSPHATASE, TOTAL, 25002000: 62 U/L (ref 35–144)
ALT SERPL-CCNC: 42 U/L (ref 9–46)
AST SERPL W P-5'-P-CCNC: 32 U/L (ref 10–35)
BASOPHILS # BLD: 58 CELLS/UL (ref 0–200)
BASOPHILS NFR BLD: 1 %
BILIRUB SERPL-MCNC: 0.5 MG/DL (ref 0.2–1.2)
BUN SERPL-MCNC: 18 MG/DL (ref 7–25)
BUN/CREATININE RATIO,BUCR: ABNORMAL (CALC) (ref 6–22)
CALCIUM SERPL-MCNC: 9.2 MG/DL (ref 8.6–10.3)
CHLORIDE SERPL-SCNC: 102 MMOL/L (ref 98–110)
CHOL/HDL RATIO,CHHDX: 2.3 (CALC)
CHOLEST SERPL-MCNC: 115 MG/DL
CO2 SERPL-SCNC: 29 MMOL/L (ref 20–32)
CREAT SERPL-MCNC: 0.76 MG/DL (ref 0.7–1.18)
CREATININE URINE,9612018: 146 MG/DL (ref 20–320)
EAG (MG/DL),9916804: 146 MG/DL
EAG (MMOL/L),9916805: 8.1 MMOL/L
EOSINOPHIL # BLD: 168 CELLS/UL (ref 15–500)
EOSINOPHIL NFR BLD: 2.9 %
ERYTHROCYTE [DISTWIDTH] IN BLOOD BY AUTOMATED COUNT: 12.9 % (ref 11–15)
GLOBULIN,GLOB: 2.4 G/DL (CALC) (ref 1.9–3.7)
GLUCOSE SERPL-MCNC: 122 MG/DL (ref 65–99)
HBA1C MFR BLD HPLC: 6.7 % OF TOTAL HGB
HCT VFR BLD AUTO: 46.1 % (ref 38.5–50)
HDLC SERPL-MCNC: 50 MG/DL
HGB BLD-MCNC: 15.4 G/DL (ref 13.2–17.1)
LDL-CHOLESTEROL: 41 MG/DL (CALC)
LYMPHOCYTES # BLD: 1966 CELLS/UL (ref 850–3900)
LYMPHOCYTES NFR BLD: 33.9 %
MCH RBC QN AUTO: 31.7 PG (ref 27–33)
MCHC RBC AUTO-ENTMCNC: 33.4 G/DL (ref 32–36)
MCV RBC AUTO: 94.9 FL (ref 80–100)
MICROALBUMIN,URINE RANDOM 140054: 0.9 MG/DL
MICROALBUMIN/CREAT RATIO: 6 MCG/MG CREAT
MONOCYTES # BLD: 539 CELLS/UL (ref 200–950)
MONOCYTES NFR BLD: 9.3 %
NEUTROPHILS # BLD AUTO: 3068 CELLS/UL (ref 1500–7800)
NEUTROPHILS # BLD: 52.9 %
NON-HDL CHOLESTEROL, 011976: 65 MG/DL (CALC)
PLATELET # BLD AUTO: 159 THOUSAND/UL (ref 140–400)
PMV BLD AUTO: 10.6 FL (ref 7.5–12.5)
POTASSIUM SERPL-SCNC: 4.8 MMOL/L (ref 3.5–5.3)
PROT SERPL-MCNC: 7.1 G/DL (ref 6.1–8.1)
RBC # BLD AUTO: 4.86 MILLION/UL (ref 4.2–5.8)
SODIUM SERPL-SCNC: 138 MMOL/L (ref 135–146)
TRIGL SERPL-MCNC: 159 MG/DL (ref ?–150)
WBC # BLD AUTO: 5.8 THOUSAND/UL (ref 3.8–10.8)

## 2022-04-15 ENCOUNTER — OFFICE VISIT (OUTPATIENT)
Dept: INTERNAL MEDICINE CLINIC | Age: 72
End: 2022-04-15
Payer: COMMERCIAL

## 2022-04-15 VITALS
DIASTOLIC BLOOD PRESSURE: 54 MMHG | WEIGHT: 184.2 LBS | HEART RATE: 60 BPM | SYSTOLIC BLOOD PRESSURE: 94 MMHG | HEIGHT: 69 IN | RESPIRATION RATE: 16 BRPM | BODY MASS INDEX: 27.28 KG/M2 | OXYGEN SATURATION: 97 % | TEMPERATURE: 98.6 F

## 2022-04-15 DIAGNOSIS — I10 ESSENTIAL HYPERTENSION: ICD-10-CM

## 2022-04-15 DIAGNOSIS — E78.1 HYPERTRIGLYCERIDEMIA: ICD-10-CM

## 2022-04-15 DIAGNOSIS — E11.9 DIABETES MELLITUS WITHOUT COMPLICATION (HCC): Primary | ICD-10-CM

## 2022-04-15 DIAGNOSIS — M25.511 RIGHT SHOULDER PAIN, UNSPECIFIED CHRONICITY: ICD-10-CM

## 2022-04-15 DIAGNOSIS — M54.50 RIGHT-SIDED LOW BACK PAIN WITHOUT SCIATICA, UNSPECIFIED CHRONICITY: ICD-10-CM

## 2022-04-15 DIAGNOSIS — M48.02 SPINAL STENOSIS IN CERVICAL REGION: ICD-10-CM

## 2022-04-15 DIAGNOSIS — C67.9 MALIGNANT NEOPLASM OF URINARY BLADDER, UNSPECIFIED SITE (HCC): ICD-10-CM

## 2022-04-15 PROCEDURE — 99214 OFFICE O/P EST MOD 30 MIN: CPT | Performed by: INTERNAL MEDICINE

## 2022-04-15 RX ORDER — METHOCARBAMOL 750 MG/1
750 TABLET, FILM COATED ORAL 4 TIMES DAILY
Qty: 60 TABLET | Refills: 5 | Status: SHIPPED | OUTPATIENT
Start: 2022-04-15 | End: 2022-10-21

## 2022-04-15 RX ORDER — GABAPENTIN 100 MG/1
CAPSULE ORAL
COMMUNITY
Start: 2021-09-01 | End: 2022-10-21

## 2022-04-15 NOTE — PROGRESS NOTES
SUBJECTIVE:   Mr. Rell Negro is a 80 y.o. W male who is here for follow up of routine medical issues. Chief Complaint   Patient presents with    Follow-up     6 months       He has been seeing Dr. Stephen Whitten, rheumatologist, for arthritis. On Celebrex. He has had issues with pain in R knee, elbow, thumb. He was started on meloxicam, which has helped. Back pain: Dr. Willie Gutierrez retired; now he sees Dr. Pina Garnica. We noted 2020: It got so bad he went to see Dr. Willie Gutierrez, and was sent to PT. Last week he did dry needling and \"it made a big difference. \"  Pain still severe--\"I could hardly get up this morning. \"   He has gotten DARRELL. He was put on low dose gabapentin. He takes about 2 oxycodone per day. He has chronic low back pain: Pain generally is radiating especially down R leg. It is recalled that he underwent C-spine fusion in 2012, with Dr. Willie Gutierrez. Still, he gets tingling and throbbing at times, particularly after long days, or activity. Dr. Grant Tomas has treated him for bladder cancer, and administered BCG. Now seeing him yearly. Stress: Working part time now--\"it's been really good for me. My mind is sharper than ever. \"     Memory: Tested by Dr. Rafi Torres in 2014, and no dementia. We noted before: He would like to have his memory checked. He has been a bit forgetful at times--noticing issues with short term recall, losing objects, forgetting the name of a coworker, etc.  His wife is concerned. At this time, he is otherwise doing well and has brought no other complaints to my attention today. For a list of the medical issues addressed today, see the assessment and plan below. PMH: His ophthalmologist is Michael Petty  Past Medical History:   Diagnosis Date    Adopted     Arthritis     Went to see Dr. Erlin Lopez 2013, and was told he has arthritis in the hands and shoulder.       Back pain     Bl[address] [facility] ([facility] [address]     procedure6/2018    DM (diabetes mellitus) ([facility] [address] 10/18/2012  Hypertension     Hypertriglyceridemia 8/19/2014    Liver disease     hx hepatitis C 9 yrs. ago- treated    Malignant neoplasm of urinary bladder ([facility] [address] 9/13/2018    Shingles     Unspecified adverse effect of anesthesia     BLOOD PRESSURE WENT EXTREMILY HIGH DURING ANESTHESIA for lt rotator cuff 2009       PSH: Last colonoscopy in 2017; repeat 10 years. Past Surgical History:   Procedure Laterality Date    HX CERVICAL FUSION  2012    C3-C5; Dr. Sharlene Boyce HX ORTHOPAEDIC  2009    Left Rotator cuff repair    HX ORTHOPAEDIC  7/2011    Right Rotator cuff repair    HX POLYPECTOMY  2011    Dr. Cha Florez: He is allergic to codeine, metformin, and zostavax [zoster vaccine live (pf)]. MEDS:   Current Outpatient Medications   Medication Sig    gabapentin (NEURONTIN) 100 mg capsule     PreviDent 5000 Plus 1.1 % crea USE AS DIRECTED    valsartan (DIOVAN) 160 mg tablet TAKE 1 TABLET NIGHTLY    rosuvastatin (CRESTOR) 5 mg tablet Take 1 Tablet by mouth daily. Replaces lipitor on 12/20/19    celecoxib (CeleBREX) 200 mg capsule Take 200 mg by mouth daily.  desonide (TRIDESILON) 0.05 % cream Apply  to affected area two (2) times a day.  aspirin 81 mg tablet Take 81 mg by mouth daily.  cyclobenzaprine (FLEXERIL) 10 mg tablet Take 1 Tablet by mouth three (3) times daily as needed for Muscle Spasm(s). (Patient not taking: Reported on 4/15/2022)    ketoconazole (NIZORAL) 2 % shampoo Apply  to affected area as needed. (Patient not taking: Reported on 11/5/2021)    acyclovir (ZOVIRAX) 5 % topical cream Apply  to affected area five (5) times daily. (Patient taking differently: Apply  to affected area as needed.)    pramoxine-hydrocortisone (PROTOFOAM-HC) 2.5-1 % topical cream Apply  to affected area three (3) times daily. (Patient not taking: Reported on 11/5/2021)     No current facility-administered medications for this visit. FH: Adopted. SH: He is a  working part time. He reports that he quit smoking about 24 years ago. His smoking use included cigarettes. He has a 30.00 pack-year smoking history. He has never used smokeless tobacco. He reports current alcohol use. He reports that he does not use drugs. ROS: See above; Complete ROS otherwise negative. OBJECTIVE:   Vitals:   Visit Vitals  BP (!) 94/54 (BP 1 Location: Left arm, BP Patient Position: Sitting, BP Cuff Size: Large adult)   Pulse 60   Temp 98.6 °F (37 °C) (Temporal)   Resp 16   Ht 5' 9\" (1.753 m)   Wt 184 lb 3.2 oz (83.6 kg)   SpO2 97%   BMI 27.20 kg/m²        Gen: Pleasant 80 y.o.  male in NAD.   HEENT: PERRLA. EOMI. OP moist and pink.  Neck: Supple.  No LAD.  HEART: RRR, No M/G/R.   LUNGS: CTAB No W/R.   ABDOMEN: S, NT, ND, BS+.   EXTREMITIES: Warm. No C/C/E. MUSCULOSKELETAL: Normal ROM, muscle strength 5/5 all groups. NEURO: Alert and oriented x 3.  Cranial nerves grossly intact.  No focal sensory or motor deficits noted. SKIN: Warm. Dry. No rashes or other lesions noted. Lab Results   Component Value Date/Time    Hemoglobin A1c 6.7 (H) 04/11/2022 08:36 AM       Lab Results   Component Value Date/Time    Cholesterol, total 115 04/11/2022 08:36 AM    HDL Cholesterol 50 04/11/2022 08:36 AM    LDL-CHOLESTEROL 41 04/11/2022 08:36 AM    LDL, calculated 38 02/27/2020 09:51 AM    VLDL, calculated 38 02/27/2020 09:51 AM    Triglyceride 159 (H) 04/11/2022 08:36 AM    Cholesterol/HDL ratio 2.3 04/11/2022 08:36 AM       ASSESSMENT/ PLAN:    1. Diabetes mellitus: Controlled at last check. Continue metformin bid. 2. Low back pain, chronic / Sciatica: Ongoing. Following with spine doctor. Also rheumatologist, Dr. Trace Kenney. We'll continue current meds. He could go higher on gabapentin dose. I'll add Rx for robaxin. 3. Neck pain: s/p PT. Follow up w/spine specialist.   4. Dyslipidemia: Labs okay on crestor.  Discussed previously role of statin and low dose aspirin for cardiac protection. DM confers high CV risk. 5. History of Hepatitis C s/p treatment: CMP shows transaminases back to normal; recheck periodically. 6. Low libido: Not discussed today. 7. Memory disturbance: Stable. No dementia per neuropsych testing. 8. Snoring: Referred prev to orthodontist.  9. Urinary frequency and incontinence: Now on vesicare. Seeing urologist.   10. Bladder cancer, s/p BCG per Dr. Sean Reyez. I have reviewed the patient's medications and risks/side effects/benefits were discussed. Diagnosis(-es) explained to patient and questions answered. Literature provided where appropriate. Follow-up and Dispositions    · Return in about 6 months (around 10/15/2022) for DM.

## 2022-04-15 NOTE — PROGRESS NOTES
1. \"Have you been to the ER, urgent care clinic since your last visit? Hospitalized since your last visit? \" No    2. \"Have you seen or consulted any other health care providers outside of the [address] [place] [address] since your last visit? \" No     3. For patients aged 39-70: Has the patient had a colonoscopy / FIT/ Cologuard? No      If the patient is female:    4. For patients aged 41-77: Has the patient had a mammogram within the past 2 years? NA - based on age or sex      11. For patients aged 21-65: Has the patient had a pap smear?  NA - based on age or sex

## 2022-04-21 DIAGNOSIS — M54.50 RIGHT-SIDED LOW BACK PAIN WITHOUT SCIATICA, UNSPECIFIED CHRONICITY: Primary | ICD-10-CM

## 2022-04-21 DIAGNOSIS — M48.02 SPINAL STENOSIS IN CERVICAL REGION: ICD-10-CM

## 2022-04-21 RX ORDER — OXYCODONE HYDROCHLORIDE 5 MG/1
5 CAPSULE ORAL
Qty: 60 CAPSULE | Refills: 0 | Status: SHIPPED | OUTPATIENT
Start: 2022-04-21 | End: 2022-05-13 | Stop reason: SDUPTHER

## 2022-04-21 NOTE — TELEPHONE ENCOUNTER
Future Appointments:  No future appointments. Last Appointment With Me:  4/15/2022     Requested Prescriptions     Pending Prescriptions Disp Refills    oxyCODONE (OXYIR) 5 mg capsule 60 Capsule 0     Sig: Take 1 Capsule by mouth every four (4) hours as needed for Pain for up to 3 days. Max Daily Amount: 30 mg.

## 2022-04-21 NOTE — TELEPHONE ENCOUNTER
----- Message from Kitty Paniagua sent at 4/21/2022  6:59 AM EDT -----  Regarding: prescription refill  Good Morning Dr Mendy Haile. Would you please refill my pain meds. I haver a plan. I start physical therapy tomorrow morning and I filled the prescription for the new muscle relaxers ypu just gave to me. I hope to greatly reduce or pretty much eliminated use of these pain meds in the future. No body likes bugs.    Thank you    Nikki Hale

## 2022-05-13 DIAGNOSIS — M54.50 RIGHT-SIDED LOW BACK PAIN WITHOUT SCIATICA, UNSPECIFIED CHRONICITY: ICD-10-CM

## 2022-05-13 DIAGNOSIS — M48.02 SPINAL STENOSIS IN CERVICAL REGION: ICD-10-CM

## 2022-05-13 RX ORDER — OXYCODONE HYDROCHLORIDE 5 MG/1
5 CAPSULE ORAL
Qty: 45 CAPSULE | Refills: 0 | Status: SHIPPED | OUTPATIENT
Start: 2022-05-13 | End: 2022-05-21

## 2022-06-01 DIAGNOSIS — M54.50 RIGHT-SIDED LOW BACK PAIN WITHOUT SCIATICA, UNSPECIFIED CHRONICITY: Primary | ICD-10-CM

## 2022-06-01 NOTE — TELEPHONE ENCOUNTER
Future Appointments:  No future appointments. Last Appointment With Me:  4/15/2022     Requested Prescriptions     Pending Prescriptions Disp Refills    oxyCODONE (OXYIR) 5 mg capsule 30 Capsule 0     Sig: Take 1 Capsule by mouth every four (4) hours as needed for Pain for up to 3 days. Max Daily Amount: 30 mg.

## 2022-06-01 NOTE — TELEPHONE ENCOUNTER
----- Message from [employer] sent at 5/31/2022  5:56 PM EDT -----  Regarding: prescription refill  Jarek Salazar, First thank you for taking time to explain why I should stay on aspirin, I will. I am having to work 5 days a week and it is taking a toll on my health but I have no choice. In spite of this I am still determined to continue to cut back on the pain meds. Will you please reduce the number to 30 this time.  Thank You Dr Nicholas Mazariegos

## 2022-06-02 RX ORDER — OXYCODONE HYDROCHLORIDE 5 MG/1
5 CAPSULE ORAL
Qty: 30 CAPSULE | Refills: 0 | Status: SHIPPED | OUTPATIENT
Start: 2022-06-02 | End: 2022-06-30 | Stop reason: SDUPTHER

## 2022-06-30 DIAGNOSIS — M54.50 RIGHT-SIDED LOW BACK PAIN WITHOUT SCIATICA, UNSPECIFIED CHRONICITY: ICD-10-CM

## 2022-06-30 RX ORDER — OXYCODONE HYDROCHLORIDE 5 MG/1
5 CAPSULE ORAL
Qty: 30 CAPSULE | Refills: 0 | Status: SHIPPED | OUTPATIENT
Start: 2022-06-30 | End: 2022-07-03

## 2022-08-11 RX ORDER — ROSUVASTATIN CALCIUM 5 MG/1
5 TABLET, COATED ORAL DAILY
Qty: 90 TABLET | Refills: 3 | Status: SHIPPED | OUTPATIENT
Start: 2022-08-11

## 2022-08-19 ENCOUNTER — PATIENT MESSAGE (OUTPATIENT)
Dept: INTERNAL MEDICINE CLINIC | Age: 72
End: 2022-08-19

## 2022-08-19 DIAGNOSIS — I10 PRIMARY HYPERTENSION: Primary | ICD-10-CM

## 2022-08-19 DIAGNOSIS — M48.02 SPINAL STENOSIS IN CERVICAL REGION: ICD-10-CM

## 2022-08-19 RX ORDER — OXYCODONE HYDROCHLORIDE 5 MG/1
5 CAPSULE ORAL
Qty: 30 CAPSULE | Refills: 0 | Status: SHIPPED | OUTPATIENT
Start: 2022-08-19 | End: 2022-08-22

## 2022-08-19 RX ORDER — VALSARTAN 160 MG/1
TABLET ORAL
Qty: 90 TABLET | Refills: 3 | Status: SHIPPED | OUTPATIENT
Start: 2022-08-19 | End: 2022-10-16

## 2022-08-19 NOTE — TELEPHONE ENCOUNTER
From: Evita Mcgraw  To: Yuly Castro MD  Sent: 8/19/2022 9:19 AM EDT  Subject: refill    Hello Dr Nanda Pisano, I am out of my oxycodone medication. Would you refill it for me please. I also requested my valsartin to be refilled when I got rosuvastatin refilled but the pharmacy missed it I guess. If I need a refill for Valsartin please add that for me.  Thank Nga Mcwilliams,[facility]

## 2022-08-19 NOTE — TELEPHONE ENCOUNTER
PCP: Chandler Eduardo MD    Last appt: 4/15/2022  No future appointments. Requested Prescriptions     Pending Prescriptions Disp Refills    valsartan (DIOVAN) 160 mg tablet 90 Tablet 3     Sig: TAKE 1 TABLET NIGHTLY    oxyCODONE (OXYIR) 5 mg capsule 30 Capsule 0     Sig: Take 1 Capsule by mouth every four (4) hours as needed for Pain for up to 3 days. Max Daily Amount: 30 mg.

## 2022-09-08 LAB
ALB/GLOBRATIO, 58C: 2 (CALC) (ref 1–2.5)
ALBUMIN SERPL-MCNC: 4.9 G/DL (ref 3.6–5.1)
ALKALINE PHOSPHATASE, TOTAL, 25002000: 61 U/L (ref 35–144)
ALT SERPL-CCNC: 36 U/L (ref 9–46)
AST SERPL W P-5'-P-CCNC: 31 U/L (ref 10–35)
BASOPHILS # BLD: 62 CELLS/UL (ref 0–200)
BASOPHILS NFR BLD: 0.9 %
BILIRUB SERPL-MCNC: 0.9 MG/DL (ref 0.2–1.2)
BUN SERPL-MCNC: 17 MG/DL (ref 7–25)
BUN/CREATININE RATIO,BUCR: ABNORMAL (CALC) (ref 6–22)
CALCIUM SERPL-MCNC: 9.9 MG/DL (ref 8.6–10.3)
CHLORIDE SERPL-SCNC: 103 MMOL/L (ref 98–110)
CO2 SERPL-SCNC: 30 MMOL/L (ref 20–32)
CREAT SERPL-MCNC: 0.82 MG/DL (ref 0.7–1.28)
EGFR: 94 ML/MIN/1.73M2
EOSINOPHIL # BLD: 200 CELLS/UL (ref 15–500)
EOSINOPHIL NFR BLD: 2.9 %
ERYTHROCYTE [DISTWIDTH] IN BLOOD BY AUTOMATED COUNT: 13 % (ref 11–15)
GLOBULIN,GLOB: 2.5 G/DL (CALC) (ref 1.9–3.7)
GLUCOSE SERPL-MCNC: 144 MG/DL (ref 65–99)
HCT VFR BLD AUTO: 48.6 % (ref 38.5–50)
HGB BLD-MCNC: 16.1 G/DL (ref 13.2–17.1)
LYMPHOCYTES # BLD: 2305 CELLS/UL (ref 850–3900)
LYMPHOCYTES NFR BLD: 33.4 %
MCH RBC QN AUTO: 31.1 PG (ref 27–33)
MCHC RBC AUTO-ENTMCNC: 33.1 G/DL (ref 32–36)
MCV RBC AUTO: 93.8 FL (ref 80–100)
MONOCYTES # BLD: 524 CELLS/UL (ref 200–950)
MONOCYTES NFR BLD: 7.6 %
NEUTROPHILS # BLD AUTO: 3809 CELLS/UL (ref 1500–7800)
NEUTROPHILS # BLD: 55.2 %
PLATELET # BLD AUTO: 149 THOUSAND/UL (ref 140–400)
PMV BLD AUTO: 11.4 FL (ref 7.5–12.5)
POTASSIUM SERPL-SCNC: 4.7 MMOL/L (ref 3.5–5.3)
PROT SERPL-MCNC: 7.4 G/DL (ref 6.1–8.1)
RBC # BLD AUTO: 5.18 MILLION/UL (ref 4.2–5.8)
SODIUM SERPL-SCNC: 139 MMOL/L (ref 135–146)
WBC # BLD AUTO: 6.9 THOUSAND/UL (ref 3.8–10.8)

## 2022-09-23 ENCOUNTER — DOCUMENTATION ONLY (OUTPATIENT)
Dept: INTERNAL MEDICINE CLINIC | Age: 72
End: 2022-09-23

## 2022-09-23 NOTE — PROGRESS NOTES
Prior Authorization has been submitted oxyCODONE HCl 5MG capsules  Form Key: [id number] - Rx #: [id number] help?  Call us at [phone]    Jessica Neal, [address] [place], [place] [address]natali Denny

## 2022-10-15 DIAGNOSIS — I10 PRIMARY HYPERTENSION: ICD-10-CM

## 2022-10-16 RX ORDER — VALSARTAN 160 MG/1
TABLET ORAL
Qty: 90 TABLET | Refills: 3 | Status: SHIPPED | OUTPATIENT
Start: 2022-10-16

## 2022-10-21 ENCOUNTER — OFFICE VISIT (OUTPATIENT)
Dept: INTERNAL MEDICINE CLINIC | Age: 72
End: 2022-10-21
Payer: COMMERCIAL

## 2022-10-21 VITALS
SYSTOLIC BLOOD PRESSURE: 138 MMHG | OXYGEN SATURATION: 99 % | HEART RATE: 60 BPM | RESPIRATION RATE: 16 BRPM | TEMPERATURE: 97.7 F | DIASTOLIC BLOOD PRESSURE: 61 MMHG | BODY MASS INDEX: 27.25 KG/M2 | WEIGHT: 184 LBS | HEIGHT: 69 IN

## 2022-10-21 DIAGNOSIS — I10 PRIMARY HYPERTENSION: Primary | ICD-10-CM

## 2022-10-21 DIAGNOSIS — I10 ESSENTIAL HYPERTENSION: ICD-10-CM

## 2022-10-21 DIAGNOSIS — M54.50 RIGHT-SIDED LOW BACK PAIN WITHOUT SCIATICA, UNSPECIFIED CHRONICITY: ICD-10-CM

## 2022-10-21 DIAGNOSIS — E11.9 DIABETES MELLITUS WITHOUT COMPLICATION (HCC): ICD-10-CM

## 2022-10-21 LAB — HBA1C MFR BLD HPLC: 6.9 %

## 2022-10-21 PROCEDURE — 99214 OFFICE O/P EST MOD 30 MIN: CPT | Performed by: INTERNAL MEDICINE

## 2022-10-21 PROCEDURE — 83036 HEMOGLOBIN GLYCOSYLATED A1C: CPT | Performed by: INTERNAL MEDICINE

## 2022-10-21 RX ORDER — OXYCODONE HYDROCHLORIDE 5 MG/1
5 CAPSULE ORAL
Qty: 30 CAPSULE | Refills: 0 | Status: SHIPPED | OUTPATIENT
Start: 2022-10-21 | End: 2022-10-24

## 2022-10-21 RX ORDER — DULOXETIN HYDROCHLORIDE 30 MG/1
30 CAPSULE, DELAYED RELEASE ORAL DAILY
Qty: 30 CAPSULE | Refills: 11 | Status: SHIPPED | OUTPATIENT
Start: 2022-10-21

## 2022-10-21 NOTE — PROGRESS NOTES
SUBJECTIVE:   Mr. Darlin Nunez is a 80 y.o. W male who is here for follow up of routine medical issues. Chief Complaint   Patient presents with    Follow-up     6 month fu       He has been seeing Dr. Demetria Mcdaniel, rheumatologist, for arthritis. On Celebrex. He has had issues with pain in R knee, elbow, thumb. Back pain: Dr. Mik Galo retired; more recently he has seen Dr. Lucy Gómez. We noted 2020: It got so bad he went to see Dr. Mik Galo, and was sent to PT. Last week he did dry needling and \"it made a big difference. \"  Pain still severe--\"I could hardly get up this morning. \"   He has gotten DARRELL. He was put on low dose gabapentin, which he has stopped along with muscle relaxer because \"I felt like Gumby. \" He takes about 2 oxycodone per day. He has chronic low back pain: Pain generally is radiating especially down R leg. It is recalled that he underwent C-spine fusion in 2012, with Dr. Mik Galo. Still, he gets tingling and throbbing at times, particularly after long days, or activity. Dr. Adeline Hodge has treated him for bladder cancer, and administered BCG. Now seeing him yearly. Stress: Working part time now--\"it's been really good for me. \"     Memory: Tested by Dr. Dawson Sat in 2014, and no dementia. We noted before: He would like to have his memory checked. He has been a bit forgetful at times--noticing issues with short term recall, losing objects, forgetting the name of a coworker, etc.  His wife is concerned. At this time, he is otherwise doing well and has brought no other complaints to my attention today. For a list of the medical issues addressed today, see the assessment and plan below. PMH: His ophthalmologist is [place]  Past Medical History:   Diagnosis Date    Adopted     Arthritis     Went to see Dr. Pastor Chambers 2013, and was told he has arthritis in the hands and shoulder.       Back pain     Bladder cancer ([facility] [address]     procedure6/2018    DM (diabetes mellitus) ([facility] [address] 10/18/2012    Hypertension     Hypertriglyceridemia 8/19/2014    Liver disease     hx hepatitis C 9 yrs. ago- treated    Malignant neoplasm of urinary bladder ([facility] [address] 9/13/2018    Shingles     Unspecified adverse effect of anesthesia     BLOOD PRESSURE WENT EXTREMILY HIGH DURING ANESTHESIA for lt rotator cuff 2009       PSH: Last colonoscopy in 2017; repeat 10 years. Past Surgical History:   Procedure Laterality Date    HX CERVICAL FUSION  2012    C3-C5; Dr. Breanna Mcleod      HX ORTHOPAEDIC  2009    Left Rotator cuff repair    HX ORTHOPAEDIC  7/2011    Right Rotator cuff repair    HX POLYPECTOMY  2011    Dr. Leopoldo Caffey TONSILLECTOMY         All: He is allergic to codeine, metformin, and zostavax [zoster vaccine live (pf)]. MEDS:   Current Outpatient Medications   Medication Sig    valsartan (DIOVAN) 160 mg tablet TAKE 1 TABLET NIGHTLY    rosuvastatin (CRESTOR) 5 mg tablet TAKE 1 TABLET BY MOUTH DAILY. REPLACES LIPITOR ON 12/20/19    hydrocortisone-pramoxine (PROCTOFOAM HC) rectal foam Insert 1 Applicator into rectum two (2) times a day. PreviDent 5000 Plus 1.1 % crea USE AS DIRECTED    celecoxib (CELEBREX) 200 mg capsule Take 200 mg by mouth daily. desonide (TRIDESILON) 0.05 % cream Apply  to affected area two (2) times a day. aspirin 81 mg tablet Take 81 mg by mouth daily. No current facility-administered medications for this visit. FH: Adopted. SH: He is a  working part time. He reports that he quit smoking about 25 years ago. His smoking use included cigarettes. He has a 30.00 pack-year smoking history. He has never used smokeless tobacco. He reports current alcohol use. He reports that he does not use drugs. ROS: See above; Complete ROS otherwise negative.      OBJECTIVE:   Vitals:   Visit Vitals  /61 (BP 1 Location: Left upper arm, BP Patient Position: Sitting, BP Cuff Size: Adult)   Pulse 60   Temp 97.7 °F (36.5 °C) (Temporal) Resp 16   Ht 5' 9\" (1.753 m)   Wt 184 lb (83.5 kg)   SpO2 99%   BMI 27.17 kg/m²        Gen: Pleasant 80 y.o.  male in NAD. HEENT: PERRLA. EOMI. OP moist and pink. Neck: Supple. No LAD. HEART: RRR, No M/G/R.   LUNGS: CTAB No W/R. ABDOMEN: S, NT, ND, BS+. EXTREMITIES: Warm. No C/C/E. MUSCULOSKELETAL: Normal ROM, muscle strength 5/5 all groups. NEURO: Alert and oriented x 3. Cranial nerves grossly intact. No focal sensory or motor deficits noted. SKIN: Warm. Dry. No rashes or other lesions noted. Lab Results   Component Value Date/Time    Hemoglobin A1c 6.7 (H) 04/11/2022 08:36 AM       Lab Results   Component Value Date/Time    Cholesterol, total 115 04/11/2022 08:36 AM    HDL Cholesterol 50 04/11/2022 08:36 AM    LDL-CHOLESTEROL 41 04/11/2022 08:36 AM    LDL, calculated 38 02/27/2020 09:51 AM    VLDL, calculated 38 02/27/2020 09:51 AM    Triglyceride 159 (H) 04/11/2022 08:36 AM    Cholesterol/HDL ratio 2.3 04/11/2022 08:36 AM       ASSESSMENT/ PLAN:    Diabetes mellitus: Controlled at last check. Off meds. Felt in 2015 that Metformin caused him pain in joints. We'll try Rybelsus. Hypertension: Continue the valsartan. Low back pain, chronic / Sciatica: Ongoing. Following with spine doctor. Also rheumatologist, Dr. Nova Mills. We'll continue current meds. He is off gabapentin and muscle relaxer because \"made me feel like Gumby. \" Try adding cymbalta. Neck pain: s/p PT. Follow up w/spine specialist.   Dyslipidemia: Labs okay on crestor. Discussed previously role of statin and low dose aspirin for cardiac protection. DM confers high CV risk. History of Hepatitis C s/p treatment: CMP shows transaminases back to normal; recheck periodically. Low libido: Not discussed today. Memory disturbance: Stable. No dementia per neuropsych testing. Snoring: Referred prev to orthodontist.  Urinary frequency and incontinence: Now on vesicare. Seeing urologist.   Bladder cancer, s/p BCG per Dr. Mira Rivera.      I have reviewed the patient's medications and risks/side effects/benefits were discussed. Diagnosis(-es) explained to patient and questions answered. Literature provided where appropriate. RTC 6 months, DM.

## 2022-10-21 NOTE — PROGRESS NOTES
1. \"Have you been to the ER, urgent care clinic since your last visit? Hospitalized since your last visit? \" No    2. \"Have you seen or consulted any other health care providers outside of the [address] [place] [address] since your last visit? \" No     3. For patients aged 39-70: Has the patient had a colonoscopy / FIT/ Cologuard?  No

## 2022-11-08 ENCOUNTER — PATIENT MESSAGE (OUTPATIENT)
Dept: INTERNAL MEDICINE CLINIC | Age: 72
End: 2022-11-08

## 2022-11-08 DIAGNOSIS — M48.02 SPINAL STENOSIS IN CERVICAL REGION: ICD-10-CM

## 2022-11-08 DIAGNOSIS — M54.50 RIGHT-SIDED LOW BACK PAIN WITHOUT SCIATICA, UNSPECIFIED CHRONICITY: Primary | ICD-10-CM

## 2022-11-08 NOTE — TELEPHONE ENCOUNTER
PCP: Claudia Erickson MD    Last appt: 10/21/2022  Future Appointments   Date Time Provider Sobia Cullen   4/21/2023  8:45 AM Claudia Erickson MD [facility] BS AMB       Requested Prescriptions     Pending Prescriptions Disp Refills    oxyCODONE (OXYIR) 5 mg capsule 30 Capsule 0     Sig: Take 1 Capsule by mouth every four (4) hours as needed for Pain for up to 3 days. Max Daily Amount: 30 mg.

## 2022-11-09 RX ORDER — OXYCODONE HYDROCHLORIDE 5 MG/1
5 CAPSULE ORAL
Qty: 30 CAPSULE | Refills: 0 | Status: SHIPPED | OUTPATIENT
Start: 2022-11-09 | End: 2022-11-12

## 2022-11-14 RX ORDER — DULOXETIN HYDROCHLORIDE 30 MG/1
30 CAPSULE, DELAYED RELEASE ORAL DAILY
Qty: 30 CAPSULE | Refills: 11 | Status: SHIPPED | OUTPATIENT
Start: 2022-11-14 | End: 2022-11-15 | Stop reason: SDUPTHER

## 2022-11-15 RX ORDER — DULOXETIN HYDROCHLORIDE 30 MG/1
30 CAPSULE, DELAYED RELEASE ORAL DAILY
Qty: 30 CAPSULE | Refills: 11 | Status: SHIPPED | OUTPATIENT
Start: 2022-11-15

## 2022-11-15 NOTE — TELEPHONE ENCOUNTER
PCP: Dee Hidalgo MD    Last appt: 10/21/2022  Future Appointments   Date Time Provider Sobia Cullen   4/21/2023  8:45 AM Dee Hidalgo MD [facility] BS AMB       Requested Prescriptions     Pending Prescriptions Disp Refills    DULoxetine (CYMBALTA) 30 mg capsule 30 Capsule 11     Sig: Take 1 Capsule by mouth daily. Name band;

## 2022-11-22 RX ORDER — METHYLPREDNISOLONE 4 MG/1
TABLET ORAL
Qty: 1 DOSE PACK | Refills: 0 | Status: SHIPPED | OUTPATIENT
Start: 2022-11-22

## 2022-12-02 RX ORDER — ROSUVASTATIN CALCIUM 5 MG/1
5 TABLET, COATED ORAL DAILY
Qty: 90 TABLET | Refills: 3 | Status: SHIPPED | OUTPATIENT
Start: 2022-12-02

## 2022-12-02 NOTE — TELEPHONE ENCOUNTER
PCP: Kiana Martinez MD    Last appt: 10/21/2022  Future Appointments   Date Time Provider Sobia Cullen   4/21/2023  8:45 AM Kiana Martinez MD [facility] BS AMB       Requested Prescriptions     Pending Prescriptions Disp Refills    rosuvastatin (CRESTOR) 5 mg tablet 90 Tablet 3     Sig: Take 1 Tablet by mouth daily.  Replaces lipitor on 12/20/19

## 2022-12-20 DIAGNOSIS — M54.50 RIGHT-SIDED LOW BACK PAIN WITHOUT SCIATICA, UNSPECIFIED CHRONICITY: Primary | ICD-10-CM

## 2022-12-20 NOTE — TELEPHONE ENCOUNTER
----- Message from Yifan Patten sent at 12/20/2022 12:12 AM EST -----  Regarding: refill request  Would you please refill my oxycodone prescription.   Thank you Dr. Gilma Cantor

## 2022-12-20 NOTE — TELEPHONE ENCOUNTER
Future Appointments:  Future Appointments   Date Time Provider Sobia Cullen   4/21/2023  8:45 AM Tobin Espitia MD [facility] BS AMB        Last Appointment With Me:  10/21/2022     Requested Prescriptions     Pending Prescriptions Disp Refills    oxyCODONE (OXYIR) 5 mg capsule 30 Capsule 0     Sig: Take 1 Capsule by mouth every four (4) hours as needed for Pain for up to 3 days. Max Daily Amount: 30 mg.

## 2022-12-21 RX ORDER — OXYCODONE HYDROCHLORIDE 5 MG/1
5 CAPSULE ORAL
Qty: 30 CAPSULE | Refills: 0 | Status: SHIPPED | OUTPATIENT
Start: 2022-12-21 | End: 2022-12-22 | Stop reason: SDUPTHER

## 2022-12-22 DIAGNOSIS — M54.50 RIGHT-SIDED LOW BACK PAIN WITHOUT SCIATICA, UNSPECIFIED CHRONICITY: ICD-10-CM

## 2022-12-22 RX ORDER — OXYCODONE HYDROCHLORIDE 5 MG/1
5 CAPSULE ORAL
Qty: 30 CAPSULE | Refills: 0 | Status: SHIPPED | OUTPATIENT
Start: 2022-12-22 | End: 2022-12-23 | Stop reason: SDUPTHER

## 2022-12-23 DIAGNOSIS — M54.50 RIGHT-SIDED LOW BACK PAIN WITHOUT SCIATICA, UNSPECIFIED CHRONICITY: ICD-10-CM

## 2022-12-23 RX ORDER — OXYCODONE HYDROCHLORIDE 5 MG/1
5 CAPSULE ORAL
Qty: 30 CAPSULE | Refills: 0 | Status: SHIPPED | OUTPATIENT
Start: 2022-12-23 | End: 2022-12-26

## 2023-01-11 ENCOUNTER — PATIENT MESSAGE (OUTPATIENT)
Dept: INTERNAL MEDICINE CLINIC | Age: 73
End: 2023-01-11

## 2023-01-11 DIAGNOSIS — I10 PRIMARY HYPERTENSION: ICD-10-CM

## 2023-01-11 DIAGNOSIS — E11.9 DIABETES MELLITUS WITHOUT COMPLICATION (HCC): ICD-10-CM

## 2023-01-11 NOTE — TELEPHONE ENCOUNTER
Future Appointments:  Future Appointments   Date Time Provider Sobia Cullen   4/21/2023  8:45 AM Adrien Carr MD [facility] BS AMB        Last Appointment With Me:  10/21/2022     Requested Prescriptions     Pending Prescriptions Disp Refills    rosuvastatin (CRESTOR) 5 mg tablet 90 Tablet 3     Sig: Take 1 Tablet by mouth daily. Replaces lipitor on 12/20/19    valsartan (DIOVAN) 160 mg tablet 90 Tablet 3     Sig: Take 1 Tablet by mouth daily. semaglutide (RYBELSUS) 3 mg tablet 90 Tablet 3     Sig: Take 1 Tablet by mouth Daily (before breakfast). celecoxib (CELEBREX) 200 mg capsule 90 Capsule 3     Sig: Take 1 Capsule by mouth daily.      Signed By: Nila Ugarte LPN     January 11, 0836

## 2023-01-11 NOTE — TELEPHONE ENCOUNTER
From: Tatyana Rollins  To: Diane Duenas MD  Sent: 1/11/2023 12:12 PM EST  Subject: Prescription refill destinations    I would like my daily medication prescriptions sent to me by mail order. These are Celecoxib 200 mg / Rosuvastatin 5mg / Valsartin 160mg / Rybelsus 3mg. All other prescriptions sent to [address] [id number]. I changed pharmacies and tried to change my mail orders back to mail order which had somehow changed. This screwed everything up. I AM SORRY. Thank you for helping me.  [employer]

## 2023-01-12 RX ORDER — CELECOXIB 200 MG/1
200 CAPSULE ORAL DAILY
Qty: 90 CAPSULE | Refills: 3 | Status: SHIPPED | OUTPATIENT
Start: 2023-01-12

## 2023-01-12 RX ORDER — VALSARTAN 160 MG/1
160 TABLET ORAL DAILY
Qty: 90 TABLET | Refills: 3 | Status: SHIPPED | OUTPATIENT
Start: 2023-01-12

## 2023-01-12 RX ORDER — ROSUVASTATIN CALCIUM 5 MG/1
5 TABLET, COATED ORAL DAILY
Qty: 90 TABLET | Refills: 3 | Status: SHIPPED | OUTPATIENT
Start: 2023-01-12

## 2023-02-10 ENCOUNTER — CLINICAL SUPPORT (OUTPATIENT)
Dept: INTERNAL MEDICINE CLINIC | Age: 73
End: 2023-02-10
Payer: COMMERCIAL

## 2023-02-10 DIAGNOSIS — Z02.89 ENCOUNTER FOR COMPLETION OF FORM WITH PATIENT: Primary | ICD-10-CM

## 2023-02-10 NOTE — Clinical Note
Nurse Visit  Walk-in appointment for form completion. Waist measurement was needed to complete form. Waist measurement of 40 inches. Patient signed formed and the form was faxed.

## 2023-03-06 DIAGNOSIS — E11.9 DIABETES MELLITUS WITHOUT COMPLICATION (HCC): Primary | ICD-10-CM

## 2023-03-08 ENCOUNTER — OFFICE VISIT (OUTPATIENT)
Dept: INTERNAL MEDICINE CLINIC | Age: 73
End: 2023-03-08
Payer: COMMERCIAL

## 2023-03-08 ENCOUNTER — APPOINTMENT (OUTPATIENT)
Dept: GENERAL RADIOLOGY | Age: 73
End: 2023-03-08

## 2023-03-08 VITALS
DIASTOLIC BLOOD PRESSURE: 63 MMHG | HEART RATE: 54 BPM | RESPIRATION RATE: 18 BRPM | HEIGHT: 69 IN | OXYGEN SATURATION: 100 % | BODY MASS INDEX: 27.13 KG/M2 | TEMPERATURE: 97.4 F | WEIGHT: 183.2 LBS | SYSTOLIC BLOOD PRESSURE: 112 MMHG

## 2023-03-08 DIAGNOSIS — R22.41 SUBCUTANEOUS NODULE OF RIGHT LOWER LEG: Primary | ICD-10-CM

## 2023-03-08 PROCEDURE — 99213 OFFICE O/P EST LOW 20 MIN: CPT | Performed by: FAMILY MEDICINE

## 2023-03-08 NOTE — PROGRESS NOTES
SUBJECTIVE:   Mr. Rasta Garcia is a 71 y.o. male who is here for follow up of routine medical issues. Subcutaneous nodule of right lower leg: Pt presents with a nodule on his right leg. He first noticed it 2 days ago. Pt denies trauma to the area. He denies pain or discomfort. He also reports a bump on his right ear, which he first noticed a few weeks ago. Pt indicates getting regular dermatology checks and has a history of skin cancer removal.    Pt is fully vaccinated for COVID. He works in [employer] and recently stopped wearing a mask. He is interested in other preventative measures and steps to take if he contracts COVID. At this time, he is otherwise doing well and has brought no other complaints to my attention today. For a list of the medical issues addressed today, see the assessment and plan below. PMH:   Past Medical History:   Diagnosis Date    Adopted     Arthritis     Went to see Dr. Romina Wheat 2013, and was told he has arthritis in the hands and shoulder. Back pain     Bladder cancer ([facility]     procedure6/2018    DM (diabetes mellitus) ([facility] [address] 10/18/2012    Hypertension     Hypertriglyceridemia 8/19/2014    Liver disease     hx hepatitis C 9 yrs. ago- treated    Malignant neoplasm of urinary bladder ([facility] [address] 9/13/2018    Shingles     Unspecified adverse effect of anesthesia     BLOOD PRESSURE WENT EXTREMILY HIGH DURING ANESTHESIA for lt rotator cuff 2009     PSH:  has a past surgical history that includes hx cholecystectomy; hx tonsillectomy; hx orthopaedic (2009); hx orthopaedic (7/2011); hx cervical fusion (2012); and hx polypectomy (2011). All: is allergic to codeine, metformin, and zostavax [zoster vaccine live (pf)]. MEDS:   Current Outpatient Medications   Medication Sig    rosuvastatin (CRESTOR) 5 mg tablet Take 1 Tablet by mouth daily. Replaces lipitor on 12/20/19    valsartan (DIOVAN) 160 mg tablet Take 1 Tablet by mouth daily.     semaglutide (RYBELSUS) 3 mg tablet Take 1 Tablet by mouth Daily (before breakfast). celecoxib (CELEBREX) 200 mg capsule Take 1 Capsule by mouth daily. DULoxetine (CYMBALTA) 30 mg capsule Take 1 Capsule by mouth daily. hydrocortisone-pramoxine (PROCTOFOAM HC) rectal foam Insert 1 Applicator into rectum two (2) times a day. PreviDent 5000 Plus 1.1 % crea USE AS DIRECTED    desonide (TRIDESILON) 0.05 % cream Apply  to affected area two (2) times a day. aspirin 81 mg tablet Take 81 mg by mouth daily. methylPREDNISolone (MEDROL DOSEPACK) 4 mg tablet Use as directed (Patient not taking: Reported on 3/8/2023)     No current facility-administered medications for this visit. FH: family history is not on file. SH:  reports that he quit smoking about 25 years ago. His smoking use included cigarettes. He has a 30.00 pack-year smoking history. He has never used smokeless tobacco. He reports current alcohol use. He reports that he does not use drugs.      Review of Systems - History obtained from the patient  General ROS: no fever, chills, fatigue, body aches  Psychological ROS: no change in anxiety, depression, SI/HI  Ophthalmic ROS: no blurred vision, myopia, double vision  ENT ROS: no dysphagia, otalgia, otorrhea, rhinorrhea, post nasal drip  Respiratory ROS: no cough, shortness of breath, or wheezing  Cardiovascular ROS: no chest pain or dyspnea on exertion  Gastrointestinal ROS: no abdominal pain, change in bowel habits, or black or bloody stools  Genito-Urinary ROS: no frequency, urgency, incontinence, dysuria, hematouria  Musculoskeletal ROS: no arthralagia, myalgia  Neurological ROS: no headaches, dizziness, lightheadedness, tremors, seizures  Dermatological ROS: +bump on leg    OBJECTIVE:   Vitals: Visit Vitals  /63 (BP 1 Location: Right upper arm, BP Patient Position: Sitting, BP Cuff Size: Adult)   Pulse (!) 54   Temp 97.4 °F (36.3 °C) (Temporal)   Resp 18   Ht 5' 9\" (1.753 m)   Wt 183 lb 3.2 oz (83.1 kg) SpO2 100%   BMI 27.05 kg/m²      Gen: Pleasant 71 y.o.  male in NAD. HEENT: AWILDA. EOMI. EXTREMITIES: Warm. No C/C/E.    NEURO: Alert and oriented x 3. Cranial nerves grossly intact. No focal sensory or motor deficits noted. SKIN: Warm. Dry. No rashes or other lesions noted. +The subcutaneous firm fixed nodule on his right anterior leg inferior to the knee. The nodule has no punctum. There is no redness or associated inflammation. ASSESSMENT/ PLAN: Diagnoses and all orders for this visit:    1. Subcutaneous nodule of right lower leg  -     XR TIB/FIB RT; Future    1. Subcutaneous nodule of right lower leg  I have ordered an x-ray of his right lower leg. On physical exam, the nodule was firm and unmoving with no punctum. There was no redness or inflammation. The area on his ear looked non-cancerous and was likely a milia. -     XR TIB/FIB RT; Future    Follow-up and Dispositions    Return if symptoms worsen or fail to improve. I have reviewed the patient's medications and risks/side effects/benefits were discussed. Diagnosis(-es) explained to patient and questions answered. Literature provided where appropriate.      Written by Kev Gu, as dictated by Maria Alejandra Prajapati MD.

## 2023-04-04 ENCOUNTER — PATIENT MESSAGE (OUTPATIENT)
Dept: INTERNAL MEDICINE CLINIC | Age: 73
End: 2023-04-04

## 2023-04-04 RX ORDER — OXYCODONE HYDROCHLORIDE 5 MG/1
5 CAPSULE ORAL
Qty: 30 CAPSULE | Refills: 0 | Status: SHIPPED
Start: 2023-04-04 | End: 2023-04-07

## 2023-04-04 NOTE — TELEPHONE ENCOUNTER
----- Message from Cristy Ice sent at 4/4/2023  9:46 AM EDT -----  Regarding: perscription Kevin Leggett, I hope you have been well. Would you please refil my prescription for oxycodone please.   Thank you China Padgett

## 2023-04-04 NOTE — TELEPHONE ENCOUNTER
Future Appointments:  Future Appointments   Date Time Provider Sobia Cullen   4/21/2023  8:45 AM Torey Gaitan MD [facility] BS AMB        Last Appointment With Me:  10/21/2022     Requested Prescriptions     Pending Prescriptions Disp Refills    oxyCODONE (OXYIR) 5 mg capsule 30 Capsule 0     Sig: Take 1 Capsule by mouth every four (4) hours as needed for Pain for up to 3 days. Max Daily Amount: 30 mg.

## 2023-04-21 ENCOUNTER — OFFICE VISIT (OUTPATIENT)
Dept: INTERNAL MEDICINE CLINIC | Age: 73
End: 2023-04-21

## 2023-04-21 VITALS
WEIGHT: 179.2 LBS | DIASTOLIC BLOOD PRESSURE: 68 MMHG | RESPIRATION RATE: 16 BRPM | HEART RATE: 51 BPM | BODY MASS INDEX: 26.54 KG/M2 | TEMPERATURE: 97.4 F | OXYGEN SATURATION: 96 % | HEIGHT: 69 IN | SYSTOLIC BLOOD PRESSURE: 125 MMHG

## 2023-04-21 DIAGNOSIS — I10 PRIMARY HYPERTENSION: ICD-10-CM

## 2023-04-21 DIAGNOSIS — C67.9 MALIGNANT NEOPLASM OF URINARY BLADDER, UNSPECIFIED SITE (HCC): ICD-10-CM

## 2023-04-21 DIAGNOSIS — M48.02 SPINAL STENOSIS IN CERVICAL REGION: ICD-10-CM

## 2023-04-21 DIAGNOSIS — E78.1 HYPERTRIGLYCERIDEMIA: ICD-10-CM

## 2023-04-21 DIAGNOSIS — E11.9 DIABETES MELLITUS WITHOUT COMPLICATION (HCC): Primary | ICD-10-CM

## 2023-04-21 RX ORDER — PREGABALIN 75 MG/1
75 CAPSULE ORAL 2 TIMES DAILY
COMMUNITY
Start: 2022-11-30

## 2023-04-21 NOTE — PROGRESS NOTES
SUBJECTIVE:   Mr. Rasta Garcia is a 71 y.o. W male who is here for follow up of routine medical issues. Chief Complaint   Patient presents with    Diabetes     6 month fu       He has been seeing Dr. Amanda Field, rheumatologist, for arthritis. On Celebrex. He has had issues with pain in R knee, elbow, thumb. Back pain: Dr. Ganga Mistry retired; more recently he has seen Dr. Melania Chicas. We noted 2020: It got so bad he went to see Dr. Ganga Mistry, and was sent to PT. Last week he did dry needling and \"it made a big difference. \"  Pain still severe--\"I could hardly get up this morning. \"   He has gotten DARRELL. He was put on low dose gabapentin, which he has stopped along with muscle relaxer because \"I felt like Gumby. \" He takes about 2 oxycodone per day. He has chronic low back pain: Pain generally is radiating especially down R leg. It is recalled that he underwent C-spine fusion in 2012, with Dr. Ganga Mistry. Still, he gets tingling and throbbing at times, particularly after long days, or activity. Dr. Maria G Ray has treated him for bladder cancer, and administered BCG. Now seeing him yearly. Stress: Working part time now--\"it's been really good for me. \"     Memory: Tested by Dr. Sven Ridley in 2014, and no dementia. We noted before: He would like to have his memory checked. He has been a bit forgetful at times--noticing issues with short term recall, losing objects, forgetting the name of a coworker, etc.  His wife is concerned. At this time, he is otherwise doing well and has brought no other complaints to my attention today. For a list of the medical issues addressed today, see the assessment and plan below. PMH: His ophthalmologist is Birgit Jha  Past Medical History:   Diagnosis Date    Adopted     Arthritis     Went to see Dr. Romina Whaet 2013, and was told he has arthritis in the hands and shoulder.       Back pain     Bladder cancer ([facility] [address]     procedure6/2018    DM (diabetes mellitus) ([facility] [address] 10/18/2012    Hypertension     Hypertriglyceridemia 8/19/2014    Liver disease     hx hepatitis C 9 yrs. ago- treated    Malignant neoplasm of urinary bladder ([facility] [address] 9/13/2018    Shingles     Unspecified adverse effect of anesthesia     BLOOD PRESSURE WENT EXTREMILY HIGH DURING ANESTHESIA for lt rotator cuff 2009       PSH: Last colonoscopy in 2017; repeat 10 years. Past Surgical History:   Procedure Laterality Date    HX CERVICAL FUSION  2012    C3-C5; Dr. Vera Watson      HX ORTHOPAEDIC  2009    Left Rotator cuff repair    HX ORTHOPAEDIC  7/2011    Right Rotator cuff repair    HX POLYPECTOMY  2011    Dr. Sharon Cavazos TONSILLECTOMY         All: He is allergic to codeine, metformin, and zostavax [zoster vaccine live (pf)]. MEDS:   Current Outpatient Medications   Medication Sig    pregabalin (LYRICA) 75 mg capsule Take 1 Capsule by mouth two (2) times a day. Max Daily Amount: 150 mg.    rosuvastatin (CRESTOR) 5 mg tablet Take 1 Tablet by mouth daily. Replaces lipitor on 12/20/19    valsartan (DIOVAN) 160 mg tablet Take 1 Tablet by mouth daily. semaglutide (RYBELSUS) 3 mg tablet Take 1 Tablet by mouth Daily (before breakfast). celecoxib (CELEBREX) 200 mg capsule Take 1 Capsule by mouth daily. DULoxetine (CYMBALTA) 30 mg capsule Take 1 Capsule by mouth daily. hydrocortisone-pramoxine (PROCTOFOAM HC) rectal foam Insert 1 Applicator into rectum two (2) times a day. PreviDent 5000 Plus 1.1 % crea USE AS DIRECTED    desonide (TRIDESILON) 0.05 % cream Apply  to affected area two (2) times a day. aspirin 81 mg tablet Take 1 Tablet by mouth daily. methylPREDNISolone (MEDROL DOSEPACK) 4 mg tablet Use as directed (Patient not taking: Reported on 4/21/2023)     No current facility-administered medications for this visit. FH: Adopted. SH: He is a  working part time. He reports that he quit smoking about 25 years ago.  His smoking use included cigarettes. He has a 30.00 pack-year smoking history. He has never used smokeless tobacco. He reports current alcohol use. He reports that he does not use drugs. ROS: See above; Complete ROS otherwise negative. OBJECTIVE:   Vitals:   Visit Vitals  /68 (BP 1 Location: Left upper arm, BP Patient Position: Sitting, BP Cuff Size: Adult)   Pulse (!) 51   Temp 97.4 °F (36.3 °C) (Temporal)   Resp 16   Ht 5' 9\" (1.753 m)   Wt 179 lb 3.2 oz (81.3 kg)   SpO2 96%   BMI 26.46 kg/m²        Gen: Pleasant 71 y.o.  male in NAD. HEENT: PERRLA. EOMI. OP moist and pink. Neck: Supple. No LAD. HEART: RRR, No M/G/R.   LUNGS: CTAB No W/R. ABDOMEN: S, NT, ND, BS+. EXTREMITIES: Warm. No C/C/E. MUSCULOSKELETAL: Normal ROM, muscle strength 5/5 all groups. NEURO: Alert and oriented x 3. Cranial nerves grossly intact. No focal sensory or motor deficits noted. SKIN: Warm. Dry. No rashes or other lesions noted. Lab Results   Component Value Date/Time    Hemoglobin A1c 6.5 (H) 04/12/2023 08:25 AM    Hemoglobin A1c (POC) 6.9 10/21/2022 09:36 AM       Lab Results   Component Value Date/Time    Cholesterol, total 120 04/12/2023 08:25 AM    HDL Cholesterol 47 04/12/2023 08:25 AM    LDL-CHOLESTEROL 49 04/12/2023 08:25 AM    LDL, calculated 38 02/27/2020 09:51 AM    VLDL, calculated 38 02/27/2020 09:51 AM    Triglyceride 166 (H) 04/12/2023 08:25 AM    Cholesterol/HDL ratio 2.6 04/12/2023 08:25 AM       ASSESSMENT/ PLAN:    Diabetes mellitus: Controlled at last check. Off meds. Felt in 2015 that Metformin caused him pain in joints. Now on Rybelsus. Hypertension: Continue the valsartan. Low back pain, chronic / Sciatica: Ongoing. Following with spine doctor. Also rheumatologist, Dr. Jessica Ferreira. We'll continue current meds. He is off gabapentin and muscle relaxer because \"made me feel like Gumby. \" Try adding cymbalta. Neck pain: s/p PT. Follow up w/spine specialist.   Dyslipidemia: Labs okay on crestor.  Discussed previously role of statin and low dose aspirin for cardiac protection. DM confers high CV risk. History of Hepatitis C s/p treatment: CMP shows transaminases back to normal; recheck periodically. Low libido: Not discussed today. Memory disturbance: Stable. No dementia per neuropsych testing. Snoring: Referred prev to orthodontist.  Urinary frequency and incontinence: Now on vesicare. Seeing urologist.   Bladder cancer, s/p BCG per Dr. Drea Cast. I have reviewed the patient's medications and risks/side effects/benefits were discussed. Diagnosis(-es) explained to patient and questions answered. Literature provided where appropriate. Follow-up and Dispositions    Return in about 4 months (around 8/21/2023) for DM. RTC 6 months, DM.

## 2023-04-21 NOTE — PROGRESS NOTES
1. \"Have you been to the ER, urgent care clinic since your last visit? Hospitalized since your last visit? \" No    2. \"Have you seen or consulted any other health care providers outside of the [address] [place] [address] since your last visit? \" No     3. For patients aged 39-70: Has the patient had a colonoscopy / FIT/ Cologuard?  Patient is not currently due

## 2023-04-25 ENCOUNTER — PATIENT MESSAGE (OUTPATIENT)
Dept: INTERNAL MEDICINE CLINIC | Age: 73
End: 2023-04-25

## 2023-04-25 DIAGNOSIS — E11.9 DIABETES MELLITUS WITHOUT COMPLICATION (HCC): ICD-10-CM

## 2023-04-25 NOTE — TELEPHONE ENCOUNTER
From: [facility]  To: Марина Espitia MD  Sent: 4/25/2023 8:59 AM EDT  Subject: prescription Rogerio Skiff Dr Carmelita Merritt, Could you lese refill my prescription for Rybelsus 3MG. Please send this to the [facility] for me.  Thank you Leila Lewis

## 2023-04-25 NOTE — TELEPHONE ENCOUNTER
Future Appointments:  Future Appointments   Date Time Provider Sobia Cullen   10/23/2023  8:45 AM Lorelei Sanchez MD [facility] BS AMB        Last Appointment With Me:  4/21/2023     Requested Prescriptions     Pending Prescriptions Disp Refills    semaglutide (RYBELSUS) 3 mg tablet 90 Tablet 3     Sig: Take 1 Tablet by mouth Daily (before breakfast).

## 2023-05-11 DIAGNOSIS — M48.02 SPINAL STENOSIS, CERVICAL REGION: Primary | ICD-10-CM

## 2023-05-11 RX ORDER — TIZANIDINE 2 MG/1
2 TABLET ORAL NIGHTLY PRN
Qty: 30 TABLET | Refills: 0 | Status: SHIPPED | OUTPATIENT
Start: 2023-05-11

## 2023-05-11 RX ORDER — OXYCODONE HYDROCHLORIDE 5 MG/1
5 TABLET ORAL EVERY 8 HOURS PRN
Qty: 30 TABLET | Refills: 0 | Status: SHIPPED | OUTPATIENT
Start: 2023-05-11 | End: 2023-05-25

## 2023-06-05 RX ORDER — TIZANIDINE 2 MG/1
TABLET ORAL
Qty: 30 TABLET | Refills: 5 | Status: SHIPPED | OUTPATIENT
Start: 2023-06-05 | End: 2023-08-04 | Stop reason: SDUPTHER

## 2023-06-28 DIAGNOSIS — M48.02 SPINAL STENOSIS, CERVICAL REGION: ICD-10-CM

## 2023-06-28 RX ORDER — OXYCODONE HYDROCHLORIDE 5 MG/1
5 TABLET ORAL EVERY 8 HOURS PRN
Qty: 30 TABLET | Refills: 0 | Status: SHIPPED | OUTPATIENT
Start: 2023-06-28 | End: 2023-08-25 | Stop reason: SDUPTHER

## 2023-07-13 ENCOUNTER — TELEPHONE (OUTPATIENT)
Age: 73
End: 2023-07-13

## 2023-07-13 NOTE — TELEPHONE ENCOUNTER
Key: [id number] - PA Case ID: [id number] - Rx #: [id number]    oxyCODONE HCl 5MG tablets    Priscilla Hurley CMA

## 2023-07-26 ENCOUNTER — PATIENT MESSAGE (OUTPATIENT)
Age: 73
End: 2023-07-26

## 2023-07-26 DIAGNOSIS — H91.90 HEARING LOSS, UNSPECIFIED HEARING LOSS TYPE, UNSPECIFIED LATERALITY: Primary | ICD-10-CM

## 2023-07-28 NOTE — TELEPHONE ENCOUNTER
From: Justin Nur  To: Dr. Erlin Beck  Sent: 7/26/2023 11:33 AM EDT  Subject: hearing aid    Hello Nurse Russell Maier, sorry to be a pain. My insurance is [place] , [employer].  Thank you Rebeka Short

## 2023-07-30 ENCOUNTER — PATIENT MESSAGE (OUTPATIENT)
Age: 73
End: 2023-07-30

## 2023-07-31 NOTE — TELEPHONE ENCOUNTER
From: Porfirio Roberson  To: Dr. Alexx Graff  Sent: 7/30/2023 8:29 AM EDT  Subject: refill needed    Helo Dr Gisela Salas and Nurse Mulu Brandt, would you please send in a refill for Rybelsus please Dr Gisela Salas. Nurse Mulu Brandt my mail order prescriptions have somehow gone back to  at the pharmacy. This has happened before. Would you please try and arrange for my following medications to be mailed to me with 90-day supply if possible. Valsartan, Rosuvatatin, Celecoxib, Rybelsus, and Gemtesa. My Home Address is: [address] [place] [address], [address] [place] [address]    Thank you for your time.  Sincerely Jammcard

## 2023-08-04 RX ORDER — VALSARTAN 160 MG/1
160 TABLET ORAL DAILY
Qty: 90 TABLET | Refills: 3 | Status: SHIPPED | OUTPATIENT
Start: 2023-08-04

## 2023-08-04 RX ORDER — DESONIDE 0.5 MG/G
CREAM TOPICAL 2 TIMES DAILY
Qty: 1 EACH | Refills: 5 | Status: SHIPPED | OUTPATIENT
Start: 2023-08-04

## 2023-08-04 RX ORDER — DULOXETIN HYDROCHLORIDE 30 MG/1
30 CAPSULE, DELAYED RELEASE ORAL DAILY
Qty: 90 CAPSULE | Refills: 3 | Status: SHIPPED | OUTPATIENT
Start: 2023-08-04

## 2023-08-04 RX ORDER — ROSUVASTATIN CALCIUM 5 MG/1
5 TABLET, COATED ORAL DAILY
Qty: 90 TABLET | Refills: 3 | Status: SHIPPED | OUTPATIENT
Start: 2023-08-04

## 2023-08-04 RX ORDER — TIZANIDINE 2 MG/1
TABLET ORAL
Qty: 30 TABLET | Refills: 5 | Status: SHIPPED | OUTPATIENT
Start: 2023-08-04

## 2023-08-04 RX ORDER — CELECOXIB 200 MG/1
200 CAPSULE ORAL DAILY
Qty: 90 CAPSULE | Refills: 3 | Status: SHIPPED | OUTPATIENT
Start: 2023-08-04

## 2023-08-07 ENCOUNTER — PATIENT MESSAGE (OUTPATIENT)
Age: 73
End: 2023-08-07

## 2023-08-07 DIAGNOSIS — E11.9 TYPE 2 DIABETES MELLITUS WITHOUT COMPLICATION, UNSPECIFIED WHETHER LONG TERM INSULIN USE (HCC): Primary | ICD-10-CM

## 2023-08-07 NOTE — TELEPHONE ENCOUNTER
PCP: Margot Owusu MD    Last appt: 4/21/2023  Future Appointments   Date Time Provider [address]   10/23/2023  8:45 AM Chris Harley MD [facility] BS AMB       Requested Prescriptions     Pending Prescriptions Disp Refills    Semaglutide 3 MG TABS 90 tablet 3     Sig: Take 1 tablet by mouth every morning (before breakfast)

## 2023-08-07 NOTE — TELEPHONE ENCOUNTER
From: Justin Nur  To: Dr. Erlin Beck  Sent: 8/7/2023 8:15 AM EDT  Subject: prescription refills    Hello, I have let myself run out of Rybelsus 3mg and I have about 2 weeks of Gemtesa 75 mg left. Would you call in refills to the [address] [place] [address] for me please.  Thank [address] [place] [address]

## 2023-08-25 ENCOUNTER — TELEMEDICINE (OUTPATIENT)
Age: 73
End: 2023-08-25
Payer: COMMERCIAL

## 2023-08-25 DIAGNOSIS — M48.02 SPINAL STENOSIS, CERVICAL REGION: ICD-10-CM

## 2023-08-25 DIAGNOSIS — E11.9 TYPE 2 DIABETES MELLITUS WITHOUT COMPLICATION, UNSPECIFIED WHETHER LONG TERM INSULIN USE (HCC): ICD-10-CM

## 2023-08-25 DIAGNOSIS — M25.511 RIGHT SHOULDER PAIN, UNSPECIFIED CHRONICITY: Primary | ICD-10-CM

## 2023-08-25 PROCEDURE — 99214 OFFICE O/P EST MOD 30 MIN: CPT | Performed by: INTERNAL MEDICINE

## 2023-08-25 PROCEDURE — 3044F HG A1C LEVEL LT 7.0%: CPT | Performed by: INTERNAL MEDICINE

## 2023-08-25 PROCEDURE — 1123F ACP DISCUSS/DSCN MKR DOCD: CPT | Performed by: INTERNAL MEDICINE

## 2023-08-25 RX ORDER — OXYCODONE HYDROCHLORIDE 5 MG/1
5 TABLET ORAL EVERY 8 HOURS PRN
Qty: 30 TABLET | Refills: 0 | Status: SHIPPED | OUTPATIENT
Start: 2023-08-25 | End: 2023-09-08

## 2023-08-25 SDOH — ECONOMIC STABILITY: HOUSING INSECURITY
IN THE LAST 12 MONTHS, WAS THERE A TIME WHEN YOU DID NOT HAVE A STEADY PLACE TO SLEEP OR SLEPT IN A SHELTER (INCLUDING NOW)?: PATIENT REFUSED

## 2023-08-25 SDOH — ECONOMIC STABILITY: FOOD INSECURITY: WITHIN THE PAST 12 MONTHS, YOU WORRIED THAT YOUR FOOD WOULD RUN OUT BEFORE YOU GOT MONEY TO BUY MORE.: PATIENT DECLINED

## 2023-08-25 SDOH — ECONOMIC STABILITY: TRANSPORTATION INSECURITY
IN THE PAST 12 MONTHS, HAS LACK OF TRANSPORTATION KEPT YOU FROM MEETINGS, WORK, OR FROM GETTING THINGS NEEDED FOR DAILY LIVING?: PATIENT DECLINED

## 2023-08-25 SDOH — ECONOMIC STABILITY: FOOD INSECURITY: WITHIN THE PAST 12 MONTHS, THE FOOD YOU BOUGHT JUST DIDN'T LAST AND YOU DIDN'T HAVE MONEY TO GET MORE.: PATIENT DECLINED

## 2023-08-25 ASSESSMENT — PATIENT HEALTH QUESTIONNAIRE - PHQ9
1. LITTLE INTEREST OR PLEASURE IN DOING THINGS: 0
SUM OF ALL RESPONSES TO PHQ9 QUESTIONS 1 & 2: 0
SUM OF ALL RESPONSES TO PHQ QUESTIONS 1-9: 0
2. FEELING DOWN, DEPRESSED OR HOPELESS: 0
SUM OF ALL RESPONSES TO PHQ QUESTIONS 1-9: 0

## 2023-08-25 NOTE — PROGRESS NOTES
Natalie Chao is a 71 y.o. male who was seen by synchronous (real-time) audio-video technology on 8/25/2023 for No chief complaint on file. Progress Note       SUBJECTIVE:   Mr. Natalie Chao is a 71 y.o. W male who is here for follow up of routine medical issues. 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 \" He has found that since then it is     He has been seeing Dr. Riya Olivares, rheumatologist, for arthritis. On Celebrex. He has had issues with pain in R knee, elbow, thumb. Back pain: Dr. Isela Frederick retired; more recently he has seen Dr. Oliver January. We noted 2020: It got so bad he went to see Dr. Isela Frederick, and was sent to PT. Last week he did dry needling and \"it made a big difference. \"  Pain still severe--\"I could hardly get up this morning. \"   He has gotten TAMMY. He was put on low dose gabapentin, which he has stopped along with muscle relaxer because \"I felt like Gumby. \" He takes about 2 oxycodone per day. He has chronic low back pain: Pain generally is radiating especially down R leg. It is recalled that he underwent C-spine fusion in 2012, with Dr. Isela Frederick. Still, he gets tingling and throbbing at times, particularly after long days, or activity. Dr. Maribell Johnson has treated him for bladder cancer, and administered BCG. Now seeing him yearly. Stress: Working part time now--\"it's been really good for me. \"     Memory: Tested by Dr. Dae Malave in 2014, and no dementia. We noted before: He would like to have his memory checked. He has been a bit forgetful at times--noticing issues with short term recall, losing objects, forgetting the name of a coworker, etc.  His wife is concerned. At this time, he is otherwise doing well and has brought no other complaints to my attention today.   For a

## 2023-08-25 NOTE — PROGRESS NOTES
1. \"Have you been to the ER, urgent care clinic since your last visit? Hospitalized since your last visit? \" Katharina Duarte     2. \"Have you seen or consulted any other health care providers outside of the [address] [place] [address] since your last visit? \" No      3. For patients aged 43-73: Has the patient had a colonoscopy / FIT/ Cologuard?  Yes

## 2023-08-28 ENCOUNTER — HOSPITAL ENCOUNTER (OUTPATIENT)
Facility: HOSPITAL | Age: 73
Discharge: HOME | End: 2023-08-31
Payer: COMMERCIAL

## 2023-08-28 DIAGNOSIS — M25.511 RIGHT SHOULDER PAIN, UNSPECIFIED CHRONICITY: ICD-10-CM

## 2023-08-28 PROCEDURE — 73030 X-RAY EXAM OF SHOULDER: CPT

## 2023-10-05 LAB
ALB/GLOBRATIO, 58C: 1.8 (CALC) (ref 1–2.5)
ALBUMIN SERPL-MCNC: 4.9 G/DL (ref 3.6–5.1)
ALKALINE PHOSPHATASE, TOTAL, 25002000: 57 U/L (ref 35–144)
ALT SERPL-CCNC: 35 U/L (ref 9–46)
AST SERPL W P-5'-P-CCNC: 41 U/L (ref 10–35)
BASOPHILS # BLD: 37 CELLS/UL (ref 0–200)
BASOPHILS NFR BLD: 0.6 %
BILIRUB SERPL-MCNC: 1 MG/DL (ref 0.2–1.2)
BUN SERPL-MCNC: 15 MG/DL (ref 7–25)
BUN/CREATININE RATIO,BUCR: ABNORMAL (CALC) (ref 6–22)
CALCIUM SERPL-MCNC: 9.6 MG/DL (ref 8.6–10.3)
CHLORIDE SERPL-SCNC: 104 MMOL/L (ref 98–110)
CHOL/HDL RATIO,CHHDX: 2.5 (CALC)
CHOLEST SERPL-MCNC: 128 MG/DL
CO2 SERPL-SCNC: 28 MMOL/L (ref 20–32)
CREAT SERPL-MCNC: 0.79 MG/DL (ref 0.7–1.28)
CREATININE URINE,9612018: 101 MG/DL (ref 20–320)
EGFR: 95 ML/MIN/1.73M2
EOSINOPHIL # BLD: 217 CELLS/UL (ref 15–500)
EOSINOPHIL NFR BLD: 3.5 %
ERYTHROCYTE [DISTWIDTH] IN BLOOD BY AUTOMATED COUNT: 12.8 % (ref 11–15)
GLOBULIN,GLOB: 2.7 G/DL (CALC) (ref 1.9–3.7)
GLUCOSE SERPL-MCNC: 121 MG/DL (ref 65–99)
HBA1C MFR BLD HPLC: 6.4 %
HBA1C MFR BLD HPLC: 6.4 % OF TOTAL HGB
HCT VFR BLD AUTO: 49.9 % (ref 38.5–50)
HDLC SERPL-MCNC: 51 MG/DL
HGB BLD-MCNC: 17.2 G/DL (ref 13.2–17.1)
LDL-CHOLESTEROL: 51 MG/DL (CALC)
LYMPHOCYTES # BLD: 2158 CELLS/UL (ref 850–3900)
LYMPHOCYTES NFR BLD: 34.8 %
MCH RBC QN AUTO: 32.9 PG (ref 27–33)
MCHC RBC AUTO-ENTMCNC: 34.5 G/DL (ref 32–36)
MCV RBC AUTO: 95.4 FL (ref 80–100)
MICROALBUMIN,URINE RANDOM 140054: 0.4 MG/DL
MICROALBUMIN/CREAT RATIO: 4 MCG/MG CREAT
MICROALBUMIN/CREATININE RATIO, EXTERNAL: 4
MONOCYTES # BLD: 471 CELLS/UL (ref 200–950)
MONOCYTES NFR BLD: 7.6 %
NEUTROPHILS # BLD AUTO: 3317 CELLS/UL (ref 1500–7800)
NEUTROPHILS # BLD: 53.5 %
NON-HDL CHOLESTEROL, 011976: 77 MG/DL (CALC)
PLATELET # BLD AUTO: 140 THOUSAND/UL (ref 140–400)
PMV BLD AUTO: 10.9 FL (ref 7.5–12.5)
POTASSIUM SERPL-SCNC: 5.3 MMOL/L (ref 3.5–5.3)
PROT SERPL-MCNC: 7.6 G/DL (ref 6.1–8.1)
RBC # BLD AUTO: 5.23 MILLION/UL (ref 4.2–5.8)
SODIUM SERPL-SCNC: 139 MMOL/L (ref 135–146)
TRIGL SERPL-MCNC: 184 MG/DL (ref ?–150)
WBC # BLD AUTO: 6.2 THOUSAND/UL (ref 3.8–10.8)

## 2023-10-05 RX ORDER — VALSARTAN 160 MG/1
160 TABLET ORAL
Qty: 90 TABLET | Refills: 3 | Status: SHIPPED | OUTPATIENT
Start: 2023-10-05

## 2023-10-09 RX ORDER — ROSUVASTATIN CALCIUM 5 MG/1
5 TABLET, COATED ORAL DAILY
Qty: 90 TABLET | Refills: 1 | Status: SHIPPED | OUTPATIENT
Start: 2023-10-09

## 2023-10-16 DIAGNOSIS — M48.02 SPINAL STENOSIS, CERVICAL REGION: Primary | ICD-10-CM

## 2023-10-16 RX ORDER — OXYCODONE HYDROCHLORIDE 5 MG/1
TABLET ORAL
COMMUNITY
Start: 2021-04-12 | End: 2023-10-16 | Stop reason: SDUPTHER

## 2023-10-16 RX ORDER — OXYCODONE HYDROCHLORIDE 5 MG/1
5 TABLET ORAL EVERY 8 HOURS PRN
Qty: 30 TABLET | Refills: 0 | Status: SHIPPED | OUTPATIENT
Start: 2023-10-16 | End: 2023-11-15

## 2023-10-16 NOTE — TELEPHONE ENCOUNTER
----- Message from Robinson Barrera sent at 10/14/2023  9:28 AM EDT -----  Regarding: prescription refil  Contact: [phone]  Rosario Yanez, I need to get my prescription for oxycodone refilled please. I am going to physical therapy which helps some, but I am still having trouble sleeping and going to work because of the pain.   Thank Dave Iglesias Dr

## 2023-11-20 ENCOUNTER — PATIENT MESSAGE (OUTPATIENT)
Age: 73
End: 2023-11-20

## 2023-11-20 DIAGNOSIS — M48.02 SPINAL STENOSIS, CERVICAL REGION: Primary | ICD-10-CM

## 2023-11-20 RX ORDER — OXYCODONE HYDROCHLORIDE 5 MG/1
5 TABLET ORAL EVERY 6 HOURS PRN
Qty: 30 TABLET | Refills: 0 | Status: SHIPPED | OUTPATIENT
Start: 2023-11-20 | End: 2023-12-20

## 2023-11-20 NOTE — TELEPHONE ENCOUNTER
From: Veena Fowler  To: Dr. Wei Meza  Sent: 11/20/2023 9:01 AM EST  Subject: prescription Dorethia Faes Dr Kemi Vasques, Please refill my prescription for Oxycodone at the [address]. Fall is once again proving to be very hard on me physically. Thank You Happy Thanksgiving.  [employer]

## 2023-11-20 NOTE — TELEPHONE ENCOUNTER
PCP: Tmiothy Bhakta MD    Last appt: 8/25/2023  Future Appointments   Date Time Provider [address]   12/11/2023  9:00 AM MD BROCK Perez BS AMB   12/13/2023 10:15 AM Timothy Bhakta MD [facility] BS AMB       Requested Prescriptions     Pending Prescriptions Disp Refills    oxyCODONE (ROXICODONE) 5 MG immediate release tablet 30 tablet 0     Sig: Take 1 tablet by mouth every 6 hours as needed for Pain for up to 30 days. Intended supply: 7 days.  Take lowest dose possible to manage pain Max Daily Amount: 20 mg

## 2023-12-11 ENCOUNTER — OFFICE VISIT (OUTPATIENT)
Age: 73
End: 2023-12-11
Payer: COMMERCIAL

## 2023-12-11 VITALS
WEIGHT: 175.8 LBS | OXYGEN SATURATION: 97 % | HEIGHT: 69 IN | SYSTOLIC BLOOD PRESSURE: 118 MMHG | HEART RATE: 63 BPM | BODY MASS INDEX: 26.04 KG/M2 | DIASTOLIC BLOOD PRESSURE: 64 MMHG

## 2023-12-11 DIAGNOSIS — E11.9 TYPE 2 DIABETES MELLITUS WITHOUT COMPLICATION, UNSPECIFIED WHETHER LONG TERM INSULIN USE (HCC): ICD-10-CM

## 2023-12-11 DIAGNOSIS — E78.2 MIXED HYPERLIPIDEMIA: Primary | ICD-10-CM

## 2023-12-11 DIAGNOSIS — I10 ESSENTIAL (PRIMARY) HYPERTENSION: ICD-10-CM

## 2023-12-11 DIAGNOSIS — M48.02 SPINAL STENOSIS IN CERVICAL REGION: ICD-10-CM

## 2023-12-11 DIAGNOSIS — E78.1 HYPERTRIGLYCERIDEMIA: ICD-10-CM

## 2023-12-11 PROCEDURE — 1123F ACP DISCUSS/DSCN MKR DOCD: CPT | Performed by: INTERNAL MEDICINE

## 2023-12-11 PROCEDURE — 3044F HG A1C LEVEL LT 7.0%: CPT | Performed by: INTERNAL MEDICINE

## 2023-12-11 PROCEDURE — 3078F DIAST BP <80 MM HG: CPT | Performed by: INTERNAL MEDICINE

## 2023-12-11 PROCEDURE — 99204 OFFICE O/P NEW MOD 45 MIN: CPT | Performed by: INTERNAL MEDICINE

## 2023-12-11 PROCEDURE — 93000 ELECTROCARDIOGRAM COMPLETE: CPT | Performed by: INTERNAL MEDICINE

## 2023-12-11 PROCEDURE — 3074F SYST BP LT 130 MM HG: CPT | Performed by: INTERNAL MEDICINE

## 2023-12-11 RX ORDER — VIBEGRON 75 MG/1
TABLET, FILM COATED ORAL DAILY
COMMUNITY

## 2023-12-11 RX ORDER — CYANOCOBALAMIN 1000 UG/ML
1000 INJECTION, SOLUTION INTRAMUSCULAR; SUBCUTANEOUS ONCE
COMMUNITY
End: 2023-12-13

## 2023-12-11 RX ORDER — M-VIT,TX,IRON,MINS/CALC/FOLIC 27MG-0.4MG
1 TABLET ORAL DAILY
COMMUNITY

## 2023-12-11 RX ORDER — ORAL SEMAGLUTIDE 3 MG/1
1 TABLET ORAL
COMMUNITY
Start: 2023-11-01

## 2023-12-11 RX ORDER — PREGABALIN 75 MG/1
75 CAPSULE ORAL 2 TIMES DAILY
COMMUNITY
Start: 2022-11-30

## 2023-12-11 ASSESSMENT — PATIENT HEALTH QUESTIONNAIRE - PHQ9
SUM OF ALL RESPONSES TO PHQ QUESTIONS 1-9: 0
SUM OF ALL RESPONSES TO PHQ9 QUESTIONS 1 & 2: 0
1. LITTLE INTEREST OR PLEASURE IN DOING THINGS: 0
SUM OF ALL RESPONSES TO PHQ QUESTIONS 1-9: 0
2. FEELING DOWN, DEPRESSED OR HOPELESS: 0

## 2023-12-11 NOTE — PROGRESS NOTES
[address] [facility], [facility], [address] [place] [address]  [phone]    [facility] [address] [place], [facility] Joyce Merchant     Subjective:        Darline Cardoza is a 71 y.o. male is here to reestablish care, last seen by me in 2019. At that time he was seeing me for some dizziness that we did not think was cardiac in origin. Eventually, he was found to have calcification in his neck and the cervical spinal region which was treated with surgery and spinal fusion, dizziness resolved with that. Today he presents with concerns about cardiovascular risk at the age of 71 and wants to be proactive to help prevent cardiovascular events. The patient denies chest pain/ shortness of breath, orthopnea, PND, LE edema, palpitations, syncope, presyncope or fatigue. He is very active doing 12-15,000 steps a day at work and or doing yard work etc.  For some of his walking he tries to push until he is huffing puffing and sweating a bit. Patient Active Problem List    Diagnosis Date Noted    Malignant neoplasm of urinary bladder ([address]) 09/13/2018    Dizziness 05/12/2015    Hypertriglyceridemia 08/19/2014    Hypertension 11/01/2013    DM (diabetes mellitus) ([address]) 10/18/2012    Spinal stenosis in cervical region 10/18/2012    Rotator cuff (capsule) sprain 07/27/2011      Lisa Gibson MD  Past Medical History:   Diagnosis Date    Adopted     Arthritis     Went to see Dr. Jason Phillips 2013, and was told he has arthritis in the hands and shoulder. Back pain     Bladder cancer ([address])     procedure6/2018    DM (diabetes mellitus) ([address]) 10/18/2012    Hypertension     Hypertriglyceridemia 8/19/2014    Liver disease     hx hepatitis C 9 yrs.  ago- treated    Malignant neoplasm of urinary bladder ([address]) 9/13/2018    Shingles     Unspecified adverse effect of anesthesia     BLOOD PRESSURE WENT EXTREMILY HIGH DURING ANESTHESIA for lt rotator cuff 2009      Past Surgical History:   Procedure Laterality Date    CERVICAL FUSION  2012

## 2023-12-13 ENCOUNTER — OFFICE VISIT (OUTPATIENT)
Age: 73
End: 2023-12-13
Payer: COMMERCIAL

## 2023-12-13 VITALS
OXYGEN SATURATION: 98 % | HEART RATE: 64 BPM | HEIGHT: 69 IN | BODY MASS INDEX: 25.89 KG/M2 | SYSTOLIC BLOOD PRESSURE: 116 MMHG | DIASTOLIC BLOOD PRESSURE: 57 MMHG | RESPIRATION RATE: 16 BRPM | TEMPERATURE: 97.5 F | WEIGHT: 174.8 LBS

## 2023-12-13 DIAGNOSIS — M25.511 RIGHT SHOULDER PAIN, UNSPECIFIED CHRONICITY: ICD-10-CM

## 2023-12-13 DIAGNOSIS — M48.02 SPINAL STENOSIS, CERVICAL REGION: ICD-10-CM

## 2023-12-13 DIAGNOSIS — M79.674 GREAT TOE PAIN, RIGHT: Primary | ICD-10-CM

## 2023-12-13 DIAGNOSIS — E11.9 TYPE 2 DIABETES MELLITUS WITHOUT COMPLICATION, UNSPECIFIED WHETHER LONG TERM INSULIN USE (HCC): ICD-10-CM

## 2023-12-13 DIAGNOSIS — I10 ESSENTIAL (PRIMARY) HYPERTENSION: ICD-10-CM

## 2023-12-13 PROCEDURE — 3074F SYST BP LT 130 MM HG: CPT | Performed by: INTERNAL MEDICINE

## 2023-12-13 PROCEDURE — 1123F ACP DISCUSS/DSCN MKR DOCD: CPT | Performed by: INTERNAL MEDICINE

## 2023-12-13 PROCEDURE — 3044F HG A1C LEVEL LT 7.0%: CPT | Performed by: INTERNAL MEDICINE

## 2023-12-13 PROCEDURE — 99214 OFFICE O/P EST MOD 30 MIN: CPT | Performed by: INTERNAL MEDICINE

## 2023-12-13 PROCEDURE — 3078F DIAST BP <80 MM HG: CPT | Performed by: INTERNAL MEDICINE

## 2023-12-13 RX ORDER — OXYCODONE HYDROCHLORIDE 5 MG/1
5 TABLET ORAL EVERY 6 HOURS PRN
Qty: 30 TABLET | Refills: 0 | Status: SHIPPED | OUTPATIENT
Start: 2023-12-13 | End: 2024-01-12

## 2023-12-13 NOTE — PROGRESS NOTES
SUBJECTIVE:   Mr. Jeannie Minaya is a 71 y.o. W male who is here for follow up of routine medical issues. Chief Complaint   Patient presents with    Follow-up    Diabetes     6 month f/u;     Lower Back Pain    Shoulder Pain     Right     Numbness     Big toe right toe; pt states noticed it 2-3 weeks ago; pt states bunun on the base of the toe; pt states no tingling        He has numbness on R big toe,     \"I've had terrible pain in R shoulder and I've been going to therapy. \"    He saw Dr. Eunice Rojas, cardiologist, because \"I hadn't seen him in years. \" EKG was fine. He has suggested a calcium CT scan--pt decided not to pursue this for now. He has been seeing Dr. Mary Tavares, rheumatologist, for arthritis. On Celebrex--now doubled. He has had issues with pain in R knee, elbow, thumb. Back pain: Dr. Fely Huang retired; more recently he has seen Dr. Carline Bryson. We noted 2020: It got so bad he went to see Dr. Fely Huang, and was sent to PT. Last week he did dry needling and \"it made a big difference. \"  Pain still severe--\"I could hardly get up this morning. \"   He has gotten TAMMY. He was put on low dose gabapentin, which he has stopped along with muscle relaxer because \"I felt like Gumby. \" He takes about 2 oxycodone per day. Currently tolerating pregabalin. He has chronic low back pain: Pain generally is radiating especially down R leg. It is recalled that he underwent C-spine fusion in 2012, with Dr. Fely Huang. Still, he gets tingling and throbbing at times, particularly after long days, or activity. Dr. Donn Paredes has treated him for bladder cancer, and administered BCG. Now seeing him yearly. Stress: Working part time now--\"it's been really good for me. \"     Memory: Tested by Dr. Lucy Bauer in 2014, and no dementia. We noted before: He would like to have his memory checked.   He has been a bit forgetful at times--noticing issues with short term recall, losing objects, forgetting the name of a

## 2024-01-03 RX ORDER — CELECOXIB 200 MG/1
200 CAPSULE ORAL 2 TIMES DAILY
Qty: 60 CAPSULE | Refills: 1 | Status: SHIPPED | OUTPATIENT
Start: 2024-01-03

## 2024-01-03 NOTE — TELEPHONE ENCOUNTER
PCP: Ken Nuñez MD    Last appt: 12/13/2023  Future Appointments   Date Time Provider Department Center   2/3/2025  8:20 AM Mendel Chavez MD [facility]  AMB       Requested Prescriptions     Pending Prescriptions Disp Refills    celecoxib (CELEBREX) 200 MG capsule 60 capsule 1     Sig: Take 1 capsule by mouth 2 times daily

## 2024-01-03 NOTE — TELEPHONE ENCOUNTER
90 day supply please    Caller requests Refill of:  celecoxib (CELEBREX) 200 MG capsule       Please send to:    [facility]/pharmacy #[id number] - [place], [place] - [address] - P [phone] - F [phone]  [address] [place] [address]  Phone: [phone] Fax: [phone]         Visit / Appointment History:  Future Appointment at [facility]:  Visit date not found   Last Appointment With PCP:  12/13/2023       Caller confirmed instructions and dosages as correct.    Caller was advised that Meds will be refilled as soon as possible, however there can be a 48-72 business hour turn around on refill requests.

## 2024-01-05 LAB
CHOL/HDL RATIO,CHHDX: 2.4 (CALC)
CHOLEST SERPL-MCNC: 124 MG/DL
HDLC SERPL-MCNC: 51 MG/DL
LDL-CHOLESTEROL: 53 MG/DL (CALC)
NON-HDL CHOLESTEROL, 011976: 73 MG/DL (CALC)
TRIGL SERPL-MCNC: 115 MG/DL (ref ?–150)

## 2024-02-14 ENCOUNTER — PATIENT MESSAGE (OUTPATIENT)
Age: 74
End: 2024-02-14

## 2024-02-14 NOTE — TELEPHONE ENCOUNTER
From: Victor Manuel Stallings  To: Dr. Ken Nuñez  Sent: 2/14/2024 7:51 AM EST  Subject: sick    I woke up last Sunday morning at 3AM and vomited. I have had no appetite and eaten nothing but a couple of pieces of toast since then. I vomited again last night at 5:00 PM. First time since Sunday AM. I am trying to drink liquids but I am sure I am dehydrated. I thoght at first it was food poisoning but now I am not so sure since it is getting no better. Please advise. Thank you Dr uNñez .   Mohamud Stallings

## 2024-02-23 ENCOUNTER — CLINICAL DOCUMENTATION (OUTPATIENT)
Age: 74
End: 2024-02-23

## 2024-02-23 NOTE — PROGRESS NOTES
Patient presents to the office to drop off [employer] form for Ken Nuñez MD. Patient advised of potential 10-14 business day turnaround time for form completion & may incur a fee of $25.00. This has been fully explained to the patient, who indicates understanding.

## 2024-02-26 RX ORDER — VALSARTAN 160 MG/1
160 TABLET ORAL DAILY
Qty: 90 TABLET | Refills: 3 | Status: SHIPPED | OUTPATIENT
Start: 2024-02-26

## 2024-02-27 ENCOUNTER — NURSE ONLY (OUTPATIENT)
Age: 74
End: 2024-02-27

## 2024-02-27 NOTE — PROGRESS NOTES
Identified pt with two pt identifiers(name and ). Reviewed record in preparation for visit and have obtained necessary documentation.  Chief Complaint   Patient presents with    Forms      The patient came to the office to sign the [facility] physician results form and have his waist circumference taken. Form was faxed, scanned into the patient's chart and the original was given to the patient.      Cyndi Navarro NATE     [facility]  [address] [place] [address], [address]   [place] [id number]  W: [phone]  F: [phone]

## 2024-04-22 ENCOUNTER — PATIENT MESSAGE (OUTPATIENT)
Age: 74
End: 2024-04-22

## 2024-04-22 DIAGNOSIS — M54.50 LOW BACK PAIN, UNSPECIFIED BACK PAIN LATERALITY, UNSPECIFIED CHRONICITY, UNSPECIFIED WHETHER SCIATICA PRESENT: Primary | ICD-10-CM

## 2024-04-22 RX ORDER — OXYCODONE HYDROCHLORIDE 5 MG/1
5 TABLET ORAL EVERY 4 HOURS PRN
Qty: 12 TABLET | Refills: 0 | Status: SHIPPED | OUTPATIENT
Start: 2024-04-22 | End: 2024-05-06

## 2024-04-22 NOTE — TELEPHONE ENCOUNTER
Future Appointments:  Future Appointments   Date Time Provider Department Center   2/3/2025  8:20 AM Mendel Chavez MD [facility] AMB        Last Appointment With Me:  12/13/2023     Requested Prescriptions     Pending Prescriptions Disp Refills    oxyCODONE (ROXICODONE) 5 MG immediate release tablet 12 tablet 0     Sig: Take 1 tablet by mouth every 4 hours as needed for Pain. Intended supply: 3 days. Take lowest dose possible to manage pain Max Daily Amount: 30 mg

## 2024-04-23 ENCOUNTER — PATIENT MESSAGE (OUTPATIENT)
Age: 74
End: 2024-04-23

## 2024-04-25 NOTE — TELEPHONE ENCOUNTER
From: Victor Manuel Stallings  To: Dr. Ken Nuñez  Sent: 4/23/2024 9:43 AM EDT  Subject: prescription refill    The drug store is saying that they need authorization from my Drs office before they can refill my oxycodone prescription. The last time this happened it took a week or more. Could you check on this for me please? Sorry to be a bother, I know you are busy. Mohamud Stallings

## 2024-04-30 DIAGNOSIS — M48.02 SPINAL STENOSIS, CERVICAL REGION: ICD-10-CM

## 2024-04-30 RX ORDER — OXYCODONE HYDROCHLORIDE 5 MG/1
5 TABLET ORAL EVERY 6 HOURS PRN
Qty: 30 TABLET | Refills: 0 | Status: SHIPPED | OUTPATIENT
Start: 2024-04-30 | End: 2024-05-30

## 2024-05-29 RX ORDER — CELECOXIB 200 MG/1
200 CAPSULE ORAL 2 TIMES DAILY
Qty: 60 CAPSULE | Refills: 1 | Status: SHIPPED | OUTPATIENT
Start: 2024-05-29

## 2024-06-11 ENCOUNTER — NURSE ONLY (OUTPATIENT)
Age: 74
End: 2024-06-11

## 2024-06-11 DIAGNOSIS — E11.9 TYPE 2 DIABETES MELLITUS WITHOUT COMPLICATION, UNSPECIFIED WHETHER LONG TERM INSULIN USE (HCC): ICD-10-CM

## 2024-06-11 LAB
ALBUMIN SERPL-MCNC: 4.1 G/DL (ref 3.5–5)
ALBUMIN/GLOB SERPL: 1.3 (ref 1.1–2.2)
ALP SERPL-CCNC: 75 U/L (ref 45–117)
ALT SERPL-CCNC: 39 U/L (ref 12–78)
ANION GAP SERPL CALC-SCNC: 5 MMOL/L (ref 5–15)
AST SERPL-CCNC: 29 U/L (ref 15–37)
BASOPHILS # BLD: 0.1 K/UL (ref 0–0.1)
BASOPHILS NFR BLD: 1 % (ref 0–1)
BILIRUB SERPL-MCNC: 0.7 MG/DL (ref 0.2–1)
BUN SERPL-MCNC: 17 MG/DL (ref 6–20)
BUN/CREAT SERPL: 22 (ref 12–20)
CALCIUM SERPL-MCNC: 9.2 MG/DL (ref 8.5–10.1)
CHLORIDE SERPL-SCNC: 108 MMOL/L (ref 97–108)
CHOLEST SERPL-MCNC: 122 MG/DL
CO2 SERPL-SCNC: 28 MMOL/L (ref 21–32)
CREAT SERPL-MCNC: 0.78 MG/DL (ref 0.7–1.3)
CREAT UR-MCNC: 148 MG/DL
DIFFERENTIAL METHOD BLD: ABNORMAL
EOSINOPHIL # BLD: 0.2 K/UL (ref 0–0.4)
EOSINOPHIL NFR BLD: 3 % (ref 0–7)
ERYTHROCYTE [DISTWIDTH] IN BLOOD BY AUTOMATED COUNT: 12.9 % (ref 11.5–14.5)
EST. AVERAGE GLUCOSE BLD GHB EST-MCNC: 131 MG/DL
GLOBULIN SER CALC-MCNC: 3.1 G/DL (ref 2–4)
GLUCOSE SERPL-MCNC: 127 MG/DL (ref 65–100)
HBA1C MFR BLD: 6.2 % (ref 4–5.6)
HCT VFR BLD AUTO: 47.8 % (ref 36.6–50.3)
HDLC SERPL-MCNC: 48 MG/DL
HDLC SERPL: 2.5 (ref 0–5)
HGB BLD-MCNC: 16.4 G/DL (ref 12.1–17)
IMM GRANULOCYTES # BLD AUTO: 0 K/UL (ref 0–0.04)
IMM GRANULOCYTES NFR BLD AUTO: 0 % (ref 0–0.5)
LDLC SERPL CALC-MCNC: 37.4 MG/DL (ref 0–100)
LYMPHOCYTES # BLD: 2.2 K/UL (ref 0.8–3.5)
LYMPHOCYTES NFR BLD: 34 % (ref 12–49)
MCH RBC QN AUTO: 32.4 PG (ref 26–34)
MCHC RBC AUTO-ENTMCNC: 34.3 G/DL (ref 30–36.5)
MCV RBC AUTO: 94.5 FL (ref 80–99)
MICROALBUMIN UR-MCNC: 1.02 MG/DL
MICROALBUMIN/CREAT UR-RTO: 7 MG/G (ref 0–30)
MONOCYTES # BLD: 0.6 K/UL (ref 0–1)
MONOCYTES NFR BLD: 10 % (ref 5–13)
NEUTS SEG # BLD: 3.5 K/UL (ref 1.8–8)
NEUTS SEG NFR BLD: 53 % (ref 32–75)
NRBC # BLD: 0 K/UL (ref 0–0.01)
NRBC BLD-RTO: 0 PER 100 WBC
PLATELET # BLD AUTO: 132 K/UL (ref 150–400)
PMV BLD AUTO: 10.9 FL (ref 8.9–12.9)
POTASSIUM SERPL-SCNC: 4.8 MMOL/L (ref 3.5–5.1)
PROT SERPL-MCNC: 7.2 G/DL (ref 6.4–8.2)
RBC # BLD AUTO: 5.06 M/UL (ref 4.1–5.7)
SODIUM SERPL-SCNC: 141 MMOL/L (ref 136–145)
TRIGL SERPL-MCNC: 183 MG/DL
VLDLC SERPL CALC-MCNC: 36.6 MG/DL
WBC # BLD AUTO: 6.6 K/UL (ref 4.1–11.1)

## 2024-06-11 SDOH — ECONOMIC STABILITY: TRANSPORTATION INSECURITY
IN THE PAST 12 MONTHS, HAS LACK OF TRANSPORTATION KEPT YOU FROM MEETINGS, WORK, OR FROM GETTING THINGS NEEDED FOR DAILY LIVING?: NO

## 2024-06-11 SDOH — ECONOMIC STABILITY: HOUSING INSECURITY
IN THE LAST 12 MONTHS, WAS THERE A TIME WHEN YOU DID NOT HAVE A STEADY PLACE TO SLEEP OR SLEPT IN A SHELTER (INCLUDING NOW)?: NO

## 2024-06-11 SDOH — ECONOMIC STABILITY: FOOD INSECURITY: WITHIN THE PAST 12 MONTHS, THE FOOD YOU BOUGHT JUST DIDN'T LAST AND YOU DIDN'T HAVE MONEY TO GET MORE.: NEVER TRUE

## 2024-06-11 SDOH — ECONOMIC STABILITY: INCOME INSECURITY: HOW HARD IS IT FOR YOU TO PAY FOR THE VERY BASICS LIKE FOOD, HOUSING, MEDICAL CARE, AND HEATING?: NOT HARD AT ALL

## 2024-06-11 SDOH — ECONOMIC STABILITY: FOOD INSECURITY: WITHIN THE PAST 12 MONTHS, YOU WORRIED THAT YOUR FOOD WOULD RUN OUT BEFORE YOU GOT MONEY TO BUY MORE.: NEVER TRUE

## 2024-06-14 ENCOUNTER — HOSPITAL ENCOUNTER (OUTPATIENT)
Facility: HOSPITAL | Age: 74
Discharge: HOME | End: 2024-06-14
Payer: COMMERCIAL

## 2024-06-14 ENCOUNTER — OFFICE VISIT (OUTPATIENT)
Age: 74
End: 2024-06-14
Payer: COMMERCIAL

## 2024-06-14 VITALS
WEIGHT: 178.6 LBS | BODY MASS INDEX: 26.45 KG/M2 | SYSTOLIC BLOOD PRESSURE: 119 MMHG | HEART RATE: 59 BPM | RESPIRATION RATE: 16 BRPM | OXYGEN SATURATION: 96 % | DIASTOLIC BLOOD PRESSURE: 62 MMHG | HEIGHT: 69 IN | TEMPERATURE: 97.2 F

## 2024-06-14 DIAGNOSIS — R06.09 DYSPNEA ON EXERTION: ICD-10-CM

## 2024-06-14 DIAGNOSIS — E11.9 TYPE 2 DIABETES MELLITUS WITHOUT COMPLICATION, UNSPECIFIED WHETHER LONG TERM INSULIN USE (HCC): Primary | ICD-10-CM

## 2024-06-14 DIAGNOSIS — M54.50 LOW BACK PAIN, UNSPECIFIED BACK PAIN LATERALITY, UNSPECIFIED CHRONICITY, UNSPECIFIED WHETHER SCIATICA PRESENT: ICD-10-CM

## 2024-06-14 DIAGNOSIS — C67.9 MALIGNANT NEOPLASM OF URINARY BLADDER, UNSPECIFIED SITE (HCC): ICD-10-CM

## 2024-06-14 DIAGNOSIS — I10 ESSENTIAL (PRIMARY) HYPERTENSION: ICD-10-CM

## 2024-06-14 DIAGNOSIS — D69.6 THROMBOCYTOPENIA, UNSPECIFIED (HCC): ICD-10-CM

## 2024-06-14 DIAGNOSIS — M48.02 SPINAL STENOSIS, CERVICAL REGION: ICD-10-CM

## 2024-06-14 PROCEDURE — 3074F SYST BP LT 130 MM HG: CPT | Performed by: INTERNAL MEDICINE

## 2024-06-14 PROCEDURE — 3078F DIAST BP <80 MM HG: CPT | Performed by: INTERNAL MEDICINE

## 2024-06-14 PROCEDURE — 1123F ACP DISCUSS/DSCN MKR DOCD: CPT | Performed by: INTERNAL MEDICINE

## 2024-06-14 PROCEDURE — 3044F HG A1C LEVEL LT 7.0%: CPT | Performed by: INTERNAL MEDICINE

## 2024-06-14 PROCEDURE — 71046 X-RAY EXAM CHEST 2 VIEWS: CPT

## 2024-06-14 PROCEDURE — 99214 OFFICE O/P EST MOD 30 MIN: CPT | Performed by: INTERNAL MEDICINE

## 2024-06-14 RX ORDER — OXYCODONE HYDROCHLORIDE 5 MG/1
5 TABLET ORAL EVERY 6 HOURS PRN
Qty: 30 TABLET | Refills: 0 | Status: SHIPPED | OUTPATIENT
Start: 2024-06-14 | End: 2024-07-14

## 2024-06-14 RX ORDER — ROSUVASTATIN CALCIUM 5 MG/1
5 TABLET, COATED ORAL DAILY
Qty: 90 TABLET | Refills: 1 | Status: SHIPPED | OUTPATIENT
Start: 2024-06-14

## 2024-06-14 ASSESSMENT — PATIENT HEALTH QUESTIONNAIRE - PHQ9
SUM OF ALL RESPONSES TO PHQ QUESTIONS 1-9: 0
1. LITTLE INTEREST OR PLEASURE IN DOING THINGS: NOT AT ALL
SUM OF ALL RESPONSES TO PHQ9 QUESTIONS 1 & 2: 0
2. FEELING DOWN, DEPRESSED OR HOPELESS: NOT AT ALL

## 2024-06-14 NOTE — PROGRESS NOTES
\"Have you been to the ER, urgent care clinic since your last visit?  Hospitalized since your last visit?\"    NO    “Have you seen or consulted any other health care providers outside of [facility] since your last visit?”    NO      “Have you had a colorectal cancer screening such as a colonoscopy/FIT/Cologuard?        Date of last Colonoscopy: 3/14/2017  No cologuard on file  No FIT/FOBT on file   No flexible sigmoidoscopy on file         Click Here for Release of Records Request      
statin and low dose aspirin for cardiac protection. DM confers high CV risk.   History of Hepatitis C s/p treatment: CMP shows transaminases back to normal; recheck periodically.   Low libido: Not discussed today.  Memory disturbance: Stable. No dementia per neuropsych testing.   Snoring: Referred prev to orthodontist.  Urinary frequency and incontinence: Now on vesicare. Seeing urologist.   Bladder cancer, s/p BCG per Dr. Stewart.   Numbness/R toe pain: Referred to Dr. Fine.     I have reviewed the patient's medications and risks/side effects/benefits were discussed. Diagnosis(-es) explained to patient and questions answered. Literature provided where appropriate.     RTC 6 months, DM.

## 2024-08-23 ENCOUNTER — OFFICE VISIT (OUTPATIENT)
Age: 74
End: 2024-08-23
Payer: COMMERCIAL

## 2024-08-23 VITALS
HEART RATE: 60 BPM | TEMPERATURE: 97.9 F | HEIGHT: 69 IN | BODY MASS INDEX: 26.63 KG/M2 | SYSTOLIC BLOOD PRESSURE: 127 MMHG | RESPIRATION RATE: 18 BRPM | WEIGHT: 179.8 LBS | DIASTOLIC BLOOD PRESSURE: 64 MMHG | OXYGEN SATURATION: 95 %

## 2024-08-23 DIAGNOSIS — M48.02 SPINAL STENOSIS, CERVICAL REGION: ICD-10-CM

## 2024-08-23 DIAGNOSIS — E11.9 TYPE 2 DIABETES MELLITUS WITHOUT COMPLICATION, UNSPECIFIED WHETHER LONG TERM INSULIN USE (HCC): ICD-10-CM

## 2024-08-23 DIAGNOSIS — Z00.00 ENCOUNTER FOR WELL ADULT EXAM WITHOUT ABNORMAL FINDINGS: Primary | ICD-10-CM

## 2024-08-23 PROCEDURE — 3074F SYST BP LT 130 MM HG: CPT | Performed by: INTERNAL MEDICINE

## 2024-08-23 PROCEDURE — 99397 PER PM REEVAL EST PAT 65+ YR: CPT | Performed by: INTERNAL MEDICINE

## 2024-08-23 PROCEDURE — 3078F DIAST BP <80 MM HG: CPT | Performed by: INTERNAL MEDICINE

## 2024-08-23 RX ORDER — ORAL SEMAGLUTIDE 3 MG/1
3 TABLET ORAL DAILY
Qty: 90 TABLET | Refills: 3 | Status: SHIPPED | OUTPATIENT
Start: 2024-08-23

## 2024-08-23 RX ORDER — OXYCODONE HYDROCHLORIDE 5 MG/1
5 TABLET ORAL EVERY 6 HOURS PRN
Qty: 30 TABLET | Refills: 0 | Status: SHIPPED | OUTPATIENT
Start: 2024-08-23 | End: 2024-09-22

## 2024-08-23 ASSESSMENT — PATIENT HEALTH QUESTIONNAIRE - PHQ9
2. FEELING DOWN, DEPRESSED OR HOPELESS: NOT AT ALL
SUM OF ALL RESPONSES TO PHQ QUESTIONS 1-9: 0
SUM OF ALL RESPONSES TO PHQ QUESTIONS 1-9: 0
SUM OF ALL RESPONSES TO PHQ9 QUESTIONS 1 & 2: 0
SUM OF ALL RESPONSES TO PHQ QUESTIONS 1-9: 0
1. LITTLE INTEREST OR PLEASURE IN DOING THINGS: NOT AT ALL
SUM OF ALL RESPONSES TO PHQ QUESTIONS 1-9: 0

## 2024-08-23 NOTE — PROGRESS NOTES
SUBJECTIVE:   Mr. Victor Manuel Stallings is a 73 y.o. W male who is here for follow up of routine medical issues.  Also CPE.     Chief Complaint   Patient presents with    Annual Exam     \"I have a little pain in R knee lately.\" Present a couple of weeks.     Exertional dyspnea is no longer noticeable. In early 2024 we noted:  He has had some SOB at times. He recalls walking up steps at a museum, and then felt lightheaded and \"I had to stop.\" Another time, he had to stop while ambulating up a hill.    He still has numbness on R big toe.  Podiatrist thinks it is related to his spine.     Right shoulder has a decreased range of motion, but is much better. Pain is better.     He has been seeing Dr. Tena, rheumatologist, for arthritis. On Celebrex--now doubled. He has had issues with pain in R knee, elbow, thumb.      Back pain: Dr. Rodriguez retired; more recently he has seen Dr. Camacho. On Pregabalin.  We noted 2020:   It got so bad he went to see Dr. Rodrgiuez, and was sent to PT. Last week he did dry needling and \"it made a big difference.\"  Pain still severe--\"I could hardly get up this morning.\"   He has gotten TAMMY. He was put on low dose gabapentin, which he has stopped along with muscle relaxer because \"I felt like Gumby.\" He takes about 2 oxycodone per day. Currently tolerating pregabalin.     He has chronic low back pain: Pain generally is radiating especially down R leg.      It is recalled that he underwent C-spine fusion in 2012, with Dr. Rodriguez.  Still, he gets tingling and throbbing at times, particularly after long days, or activity.      Dr. Stewart has treated him for bladder cancer, and administered BCG. Now seeing him yearly. He has put him on Gemtesa.     Stress: Working part time now--\"it's been really good for me.\"     Memory: Tested by Dr. Goldberg in 2014, and no dementia.  We noted before: He would like to have his memory checked.  He has been a bit forgetful at times--noticing issues with short 
\"Have you been to the ER, urgent care clinic since your last visit?  Hospitalized since your last visit?\"    NO    “Have you seen or consulted any other health care providers outside of [facility] since your last visit?”    NO      “Have you had a colorectal cancer screening such as a colonoscopy/FIT/Cologuard?    NO    Date of last Colonoscopy: 3/14/2017  No cologuard on file  No FIT/FOBT on file   No flexible sigmoidoscopy on file         Click Here for Release of Records Request  
neoplasm of urinary bladder (HCC) 2018    Shingles     Unspecified adverse effect of anesthesia     BLOOD PRESSURE WENT EXTREMILY HIGH DURING ANESTHESIA for lt rotator cuff 2009     Past Surgical History:   Procedure Laterality Date    CERVICAL FUSION  2012    C3-C5; Dr. Rodriguez    CHOLECYSTECTOMY      ORTHOPEDIC SURGERY      Left Rotator cuff repair    ORTHOPEDIC SURGERY  2011    Right Rotator cuff repair    POLYPECTOMY      Dr. Pettit    TONSILLECTOMY       No family history on file.  Social History     Tobacco Use    Smoking status: Former     Current packs/day: 0.00     Types: Cigarettes     Quit date: 1997     Years since quittin.0    Smokeless tobacco: Never   Substance Use Topics    Alcohol use: Yes     Comment: rare    Drug use: Never           Objective   Vital Signs  /64 (Site: Right Upper Arm, Position: Sitting, Cuff Size: Medium Adult)   Pulse 60   Temp 97.9 °F (36.6 °C) (Temporal)   Resp 18   Ht 1.753 m (5' 9\")   Wt 81.6 kg (179 lb 12.8 oz)   SpO2 95%   BMI 26.55 kg/m²     Wt Readings from Last 3 Encounters:   24 81.6 kg (179 lb 12.8 oz)   24 81 kg (178 lb 9.6 oz)   23 79.3 kg (174 lb 12.8 oz)       Physical Exam        Assessment & Plan   Encounter for well adult exam without abnormal findings        No follow-ups on file.     Personalized Preventive Plan  Current Health Maintenance Status  Immunization History   Administered Date(s) Administered    COVID-19, PFIZER PURPLE top, DILUTE for use, (age 12 y+), 30mcg/0.3mL 2021, 2021    COVID-19, PFIZER, ( formula), (age 12y+), IM, 30mcg/0.3mL 2023    Influenza Virus Vaccine 10/01/2014, 2015, 10/01/2016    Influenza, FLUAD, (age 65 y+), IM, Quadv, 0.5mL 2023    Influenza, FLUZONE High Dose, (age 65 y+), IM, Trivalent PF, 0.5mL 10/09/2018, 10/02/2019    Pneumococcal, PCV-13, PREVNAR 13, (age 6w+), IM, 0.5mL 2019        Health Maintenance Due   Topic Date 
Yes

## 2024-10-13 ENCOUNTER — PATIENT MESSAGE (OUTPATIENT)
Age: 74
End: 2024-10-13

## 2024-10-13 DIAGNOSIS — M48.02 SPINAL STENOSIS, CERVICAL REGION: ICD-10-CM

## 2024-10-13 DIAGNOSIS — M54.50 LOW BACK PAIN, UNSPECIFIED BACK PAIN LATERALITY, UNSPECIFIED CHRONICITY, UNSPECIFIED WHETHER SCIATICA PRESENT: Primary | ICD-10-CM

## 2024-10-14 RX ORDER — OXYCODONE HYDROCHLORIDE 5 MG/1
5 TABLET ORAL EVERY 6 HOURS PRN
Qty: 30 TABLET | Refills: 0 | Status: SHIPPED | OUTPATIENT
Start: 2024-10-14 | End: 2024-11-13

## 2024-10-14 RX ORDER — OXYCODONE HYDROCHLORIDE 5 MG/1
5 TABLET ORAL EVERY 6 HOURS PRN
Refills: 0 | Status: CANCELLED | OUTPATIENT
Start: 2024-10-14

## 2024-10-14 NOTE — TELEPHONE ENCOUNTER
Future Appointments:  Future Appointments   Date Time Provider Department Center   2/3/2025  8:20 AM Mendel Chavez MD [facility]   2/25/2025  9:00 AM Ken Nuñez [facility]        Last Appointment With Me:  8/23/2024     Requested Prescriptions     Pending Prescriptions Disp Refills    oxyCODONE (ROXICODONE) 5 MG immediate release tablet  0     Sig: Take 1 tablet by mouth every 6 hours as needed for Pain. Max Daily Amount: 20 mg

## 2024-12-02 ENCOUNTER — TELEPHONE (OUTPATIENT)
Age: 74
End: 2024-12-02

## 2024-12-02 DIAGNOSIS — Z12.11 SCREEN FOR COLON CANCER: Primary | ICD-10-CM

## 2024-12-02 NOTE — TELEPHONE ENCOUNTER
While on call with pt he would like to see if he is able to do cologuard at home for the colon check or if he will have to be seen for it     Please contact pt with answer    Pt is scheduled 12/4 for a presistent cough

## 2024-12-04 ENCOUNTER — TELEPHONE (OUTPATIENT)
Age: 74
End: 2024-12-04

## 2024-12-04 ENCOUNTER — OFFICE VISIT (OUTPATIENT)
Age: 74
End: 2024-12-04
Payer: COMMERCIAL

## 2024-12-04 ENCOUNTER — HOSPITAL ENCOUNTER (OUTPATIENT)
Facility: HOSPITAL | Age: 74
Discharge: HOME | End: 2024-12-07
Payer: COMMERCIAL

## 2024-12-04 VITALS
BODY MASS INDEX: 27.58 KG/M2 | WEIGHT: 186.2 LBS | TEMPERATURE: 97.3 F | HEIGHT: 69 IN | OXYGEN SATURATION: 98 % | DIASTOLIC BLOOD PRESSURE: 77 MMHG | HEART RATE: 56 BPM | RESPIRATION RATE: 16 BRPM | SYSTOLIC BLOOD PRESSURE: 154 MMHG

## 2024-12-04 DIAGNOSIS — R05.3 CHRONIC COUGH: Primary | ICD-10-CM

## 2024-12-04 DIAGNOSIS — R41.3 MEMORY IMPAIRMENT: ICD-10-CM

## 2024-12-04 DIAGNOSIS — R05.3 CHRONIC COUGH: ICD-10-CM

## 2024-12-04 PROCEDURE — 3078F DIAST BP <80 MM HG: CPT | Performed by: INTERNAL MEDICINE

## 2024-12-04 PROCEDURE — 1123F ACP DISCUSS/DSCN MKR DOCD: CPT | Performed by: INTERNAL MEDICINE

## 2024-12-04 PROCEDURE — 99213 OFFICE O/P EST LOW 20 MIN: CPT | Performed by: INTERNAL MEDICINE

## 2024-12-04 PROCEDURE — 71046 X-RAY EXAM CHEST 2 VIEWS: CPT

## 2024-12-04 PROCEDURE — 3077F SYST BP >= 140 MM HG: CPT | Performed by: INTERNAL MEDICINE

## 2024-12-04 NOTE — TELEPHONE ENCOUNTER
HIPAA Verified (if caller is someone other than patient): N/A       Reason for Call:     If calling to cancel, does patient want to reschedule?   N/A    Is this call related to results?   no    If yes, please let patient know they must wait for their follow up / feedback appointment to discuss.  They can, however,  a copy of the results at the clinic 2-3 weeks after their testing appt.     Is this a New Patient Appointment Request?   yes    If yes:   Requested Provider (choose all that apply - patient doesn't need to call ALL locations):   Sukhdev     Referred By:  MARIANA ESQUIVEL     Referral in chart?   yes    Patient's Insurance Carrier (please ensure you gather insurance information):       Additional notes / reason for call:   Memory impairment      **Please advise callers that it could take up to 5 business days for a return call**        Message: (any additional details from patient/caller not covered above)          Level 1 Calls - attempted to reach practice? N/A     Reason Call Marked High Priority (if applicable):

## 2024-12-04 NOTE — PROGRESS NOTES
PROGRESS NOTE  Name: Victor Manuel Stallings   : 1950       ASSESSMENT/ PLAN:     Victor Manuel \"Mohamud\" was seen today for cough.    Diagnoses and all orders for this visit:    Chronic cough  -     XR CHEST (2 VIEWS); Future  -     Full PFT Study With Bronchodilator; Future    Memory impairment  -     [facility] - Sherry Santizo PsyD, Neuropsychology, [place] ([address])    Follow up as planned in February.     I have reviewed the patient's medications and risks/side effects/benefits were discussed. Diagnosis(-es) explained to patient and questions answered. Literature provided where appropriate.                       SUBJECTIVE  Mr. Victor Manuel Stallings presents today acutely for     Chief Complaint   Patient presents with    Cough     He has had a \"nagging\" dry cough present for months, which comes and goes throughout the day. \"For the last couple of days it seems to have gone away.\" \"I wonders if the frosty weather may have knocked out something I'm allergic to.\"     He has a history of smoking, for 30 years. He quit about 28 years ago.     He has noticed a decline in short term memory. \"I get confused quickly.\" \"If something unusual happens, my normal train of though leaves me.\"       OBJECTIVE  BP (!) 154/77 (Site: Left Upper Arm, Position: Sitting, Cuff Size: Large Adult)   Pulse 56   Temp 97.3 °F (36.3 °C) (Temporal)   Resp 16   Ht 1.753 m (5' 9\")   Wt 84.5 kg (186 lb 3.2 oz)   SpO2 98%   BMI 27.50 kg/m²   Gen: Pleasant 73 y.o.  male in NAD.   HEENT: PERRLA. EOMI. OP moist and pink.  Neck: Supple.  No LAD.  HEART: RRR, No M/G/R.   LUNGS: CTAB No W/R.   ABDOMEN: S, NT, ND, BS+.   EXTREMITIES: Warm. No C/C/E. MUSCULOSKELETAL: Normal ROM, muscle strength 5/5 all groups. NEURO: Alert and oriented x 3.  Cranial nerves grossly intact.  No focal sensory or motor deficits noted. SKIN: Warm. Dry. No rashes or other lesions noted.

## 2024-12-07 ENCOUNTER — PATIENT MESSAGE (OUTPATIENT)
Age: 74
End: 2024-12-07

## 2024-12-12 ENCOUNTER — HOSPITAL ENCOUNTER (OUTPATIENT)
Facility: HOSPITAL | Age: 74
Discharge: HOME | End: 2024-12-15
Attending: INTERNAL MEDICINE
Payer: COMMERCIAL

## 2024-12-12 DIAGNOSIS — R05.3 CHRONIC COUGH: ICD-10-CM

## 2024-12-12 PROCEDURE — 94010 BREATHING CAPACITY TEST: CPT

## 2024-12-12 PROCEDURE — 94640 AIRWAY INHALATION TREATMENT: CPT

## 2024-12-12 PROCEDURE — 94060 EVALUATION OF WHEEZING: CPT

## 2024-12-12 PROCEDURE — 6370000000 HC RX 637 (ALT 250 FOR IP): Performed by: INTERNAL MEDICINE

## 2024-12-12 RX ORDER — ALBUTEROL SULFATE 90 UG/1
3 INHALANT RESPIRATORY (INHALATION) ONCE
Status: COMPLETED | OUTPATIENT
Start: 2024-12-12 | End: 2024-12-12

## 2024-12-12 RX ADMIN — ALBUTEROL SULFATE 3 PUFF: 90 AEROSOL, METERED RESPIRATORY (INHALATION) at 08:21

## 2024-12-18 RX ORDER — ROSUVASTATIN CALCIUM 5 MG/1
5 TABLET, COATED ORAL DAILY
Qty: 90 TABLET | Refills: 1 | Status: SHIPPED | OUTPATIENT
Start: 2024-12-18

## 2025-01-02 RX ORDER — VALSARTAN 160 MG/1
160 TABLET ORAL DAILY
Qty: 90 TABLET | Refills: 3 | Status: SHIPPED | OUTPATIENT
Start: 2025-01-02

## 2025-01-02 RX ORDER — VALSARTAN 160 MG/1
160 TABLET ORAL NIGHTLY
Qty: 90 TABLET | Refills: 3 | OUTPATIENT
Start: 2025-01-02

## 2025-01-02 NOTE — TELEPHONE ENCOUNTER
PCP: Ken Nuñez MD    Last appt:   12/4/2024    Future Appointments   Date Time Provider Department Center   2/3/2025  8:20 AM Mendel Chavez MD [facility] AMB   2/25/2025  9:00 AM Ken Nuñez MD [facility]   6/23/2025 10:00 AM Sherry Santizo PSYD [facility] AMB   7/10/2025 11:30 AM NEUROPSYCH TESTING [facility] BS AMB   7/25/2025  1:30 PM Sherry Santizo PSYD [facility] AMB       Requested Prescriptions     Pending Prescriptions Disp Refills    valsartan (DIOVAN) 160 MG tablet 90 tablet 3     Sig: Take 1 tablet by mouth daily

## 2025-01-14 DIAGNOSIS — J44.9 OBSTRUCTIVE LUNG DISEASE (GENERALIZED) (HCC): Primary | ICD-10-CM

## 2025-01-15 LAB — NONINV COLON CA DNA+OCC BLD SCRN STL QL: NEGATIVE

## 2025-01-22 ENCOUNTER — TELEPHONE (OUTPATIENT)
Age: 75
End: 2025-01-22

## 2025-01-22 NOTE — TELEPHONE ENCOUNTER
Pt states that testing results need to be faxed to Dr. Nikhil Nunez for upcoming appt.     No fax number.      Please call for fax as they need for [phone]

## 2025-02-03 ENCOUNTER — OFFICE VISIT (OUTPATIENT)
Age: 75
End: 2025-02-03
Payer: COMMERCIAL

## 2025-02-03 ENCOUNTER — TELEPHONE (OUTPATIENT)
Age: 75
End: 2025-02-03

## 2025-02-03 VITALS
DIASTOLIC BLOOD PRESSURE: 70 MMHG | OXYGEN SATURATION: 97 % | BODY MASS INDEX: 27.7 KG/M2 | SYSTOLIC BLOOD PRESSURE: 132 MMHG | HEART RATE: 65 BPM | WEIGHT: 187 LBS | HEIGHT: 69 IN

## 2025-02-03 DIAGNOSIS — E11.9 TYPE 2 DIABETES MELLITUS WITHOUT COMPLICATION, UNSPECIFIED WHETHER LONG TERM INSULIN USE (HCC): ICD-10-CM

## 2025-02-03 DIAGNOSIS — I10 ESSENTIAL (PRIMARY) HYPERTENSION: ICD-10-CM

## 2025-02-03 DIAGNOSIS — E78.2 MIXED HYPERLIPIDEMIA: ICD-10-CM

## 2025-02-03 DIAGNOSIS — R06.09 DOE (DYSPNEA ON EXERTION): Primary | ICD-10-CM

## 2025-02-03 DIAGNOSIS — J44.9 CHRONIC OBSTRUCTIVE PULMONARY DISEASE, UNSPECIFIED COPD TYPE (HCC): ICD-10-CM

## 2025-02-03 PROCEDURE — 3078F DIAST BP <80 MM HG: CPT | Performed by: INTERNAL MEDICINE

## 2025-02-03 PROCEDURE — G2211 COMPLEX E/M VISIT ADD ON: HCPCS | Performed by: INTERNAL MEDICINE

## 2025-02-03 PROCEDURE — 93000 ELECTROCARDIOGRAM COMPLETE: CPT | Performed by: INTERNAL MEDICINE

## 2025-02-03 PROCEDURE — 3075F SYST BP GE 130 - 139MM HG: CPT | Performed by: INTERNAL MEDICINE

## 2025-02-03 PROCEDURE — 99214 OFFICE O/P EST MOD 30 MIN: CPT | Performed by: INTERNAL MEDICINE

## 2025-02-03 PROCEDURE — 1123F ACP DISCUSS/DSCN MKR DOCD: CPT | Performed by: INTERNAL MEDICINE

## 2025-02-03 RX ORDER — KETOCONAZOLE 20 MG/ML
1 SHAMPOO, SUSPENSION TOPICAL DAILY PRN
COMMUNITY
Start: 2025-01-26

## 2025-02-03 RX ORDER — SODIUM FLUORIDE 5 MG/ML
PASTE, DENTIFRICE DENTAL
COMMUNITY
Start: 2024-07-12

## 2025-02-03 RX ORDER — ASPIRIN 81 MG/1
81 TABLET, CHEWABLE ORAL DAILY
COMMUNITY

## 2025-02-03 ASSESSMENT — PATIENT HEALTH QUESTIONNAIRE - PHQ9
SUM OF ALL RESPONSES TO PHQ QUESTIONS 1-9: 0
2. FEELING DOWN, DEPRESSED OR HOPELESS: NOT AT ALL
SUM OF ALL RESPONSES TO PHQ9 QUESTIONS 1 & 2: 0
1. LITTLE INTEREST OR PLEASURE IN DOING THINGS: NOT AT ALL

## 2025-02-03 NOTE — TELEPHONE ENCOUNTER
Sent patient a [employer] message....      Cancelled his 03/03 appointment and rescheduled to 03/05 at 11:00am.     Pt. Needed an Echo and Patsy.

## 2025-02-03 NOTE — PROGRESS NOTES
You will be scheduled for an Exercise Nuclear Stress Test and an echocardiogram after your appointment today.    Nuclear stress testing evaluates blood flow to your heart muscle and assesses cardiac function. There are 2 parts (Rest/Stress) to this procedure and will include either an exercise on a treadmill or an IV administration of a stressing medication called Lexiscan. Your cardiologist will determine which type of testing is best for you. This test can be performed in one day unless it is determined that better quality images will be obtained by performing the test over two days.     *Please arrive 15 minutes prior to your appointment time    Test Duration:    -One day testing will take 4 hours     Day of testing instructions:    NO CAFFEINE (not even decaffeinated products) 24 HOURS PRIOR TO TESTING. This includes coffee, soda, tea, chocolate, multivitamins, and migraine medication, like Excedrin or Fioricet that contains caffeine.  Nothing to eat or drink 4 HOURS prior to testing  NO NICOTINE 12 hours prior to testing  Hold any medications requested by your cardiologist. Otherwise take medications as directed with a few sips of water. If you are unsure you may bring your medications with you to take after instructed by your stressing nurse. It is recommended that you do not hold any medications prior to your test. DIABETIC PATIENTS: Take half of your insulin with a light meal 4 hours before your test.  Wear comfortable clothes and shoes (Shirts with no metal, shorts or pants, tennis shoes, no heels or flip flops)    IMPORTANT: This testing involves a cardiac tracer ordered specifically for you. If you are unable to make your appointment, please call to cancel/reschedule AT LEAST 24 hours prior to your appointment so your tracer can be cancelled. [phone].    Our office will call you with results.     Schedule follow up with Dr. Chavez in 1 year.

## 2025-02-03 NOTE — PROGRESS NOTES
[address] [place] [address] [phone]    [address], [place] [address]     Subjective:        Victor Manuel Stallings is a 74 y.o. male is here for routine f/u.  The patient states in the last 6 months he has had some progressive shortness of breath.  He walks up a particular hill and he has to stop custodial up lately.  This is a change for him.  He had some pulmonary function test which suggested obstructive lung disease and he is getting ready see pulmonary associates Dr. Nunez.  The patient denies chest pain/ shortness of breath, orthopnea, PND, LE edema, palpitations, syncope, presyncope or fatigue.    Patient Active Problem List    Diagnosis Date Noted    PACHECO (dyspnea on exertion) 02/03/2025    Mixed hyperlipidemia 02/03/2025    Chronic obstructive pulmonary disease (HCC) 02/03/2025    Thrombocytopenia, unspecified (HCC) 06/14/2024    Malignant neoplasm of urinary bladder (HCC) 09/13/2018    Dizziness 05/12/2015    Hypertriglyceridemia 08/19/2014    Essential (primary) hypertension 11/01/2013    DM (diabetes mellitus) (HCC) 10/18/2012    Spinal stenosis in cervical region 10/18/2012    Rotator cuff (capsule) sprain 07/27/2011      Ken Nuñez MD  Past Medical History:   Diagnosis Date    Adopted     Arthritis     Went to see Dr. Reagan 2013, and was told he has arthritis in the hands and shoulder.      Back pain     Bladder cancer (HCC)     procedure6/2018    DM (diabetes mellitus) (HCC) 10/18/2012    Hypertension     Hypertriglyceridemia 8/19/2014    Liver disease     hx hepatitis C 9 yrs. ago- treated    Malignant neoplasm of urinary bladder (HCC) 9/13/2018    Shingles     Unspecified adverse effect of anesthesia     BLOOD PRESSURE WENT EXTREMILY HIGH DURING ANESTHESIA for lt rotator cuff 2009      Past Surgical History:   Procedure Laterality Date    CERVICAL FUSION  2012    C3-C5; Dr. Rodriguez    CHOLECYSTECTOMY      ORTHOPEDIC SURGERY  2009    Left Rotator

## 2025-02-11 SDOH — ECONOMIC STABILITY: TRANSPORTATION INSECURITY
IN THE PAST 12 MONTHS, HAS THE LACK OF TRANSPORTATION KEPT YOU FROM MEDICAL APPOINTMENTS OR FROM GETTING MEDICATIONS?: NO

## 2025-02-11 SDOH — ECONOMIC STABILITY: FOOD INSECURITY: WITHIN THE PAST 12 MONTHS, YOU WORRIED THAT YOUR FOOD WOULD RUN OUT BEFORE YOU GOT MONEY TO BUY MORE.: NEVER TRUE

## 2025-02-11 SDOH — ECONOMIC STABILITY: INCOME INSECURITY: IN THE LAST 12 MONTHS, WAS THERE A TIME WHEN YOU WERE NOT ABLE TO PAY THE MORTGAGE OR RENT ON TIME?: NO

## 2025-02-11 SDOH — ECONOMIC STABILITY: FOOD INSECURITY: WITHIN THE PAST 12 MONTHS, THE FOOD YOU BOUGHT JUST DIDN'T LAST AND YOU DIDN'T HAVE MONEY TO GET MORE.: NEVER TRUE

## 2025-02-12 ENCOUNTER — PATIENT MESSAGE (OUTPATIENT)
Age: 75
End: 2025-02-12

## 2025-02-12 ENCOUNTER — TELEMEDICINE (OUTPATIENT)
Age: 75
End: 2025-02-12
Payer: COMMERCIAL

## 2025-02-12 DIAGNOSIS — E11.9 TYPE 2 DIABETES MELLITUS WITHOUT COMPLICATION, UNSPECIFIED WHETHER LONG TERM INSULIN USE (HCC): Primary | ICD-10-CM

## 2025-02-12 DIAGNOSIS — I10 ESSENTIAL (PRIMARY) HYPERTENSION: ICD-10-CM

## 2025-02-12 DIAGNOSIS — E78.2 MIXED HYPERLIPIDEMIA: ICD-10-CM

## 2025-02-12 DIAGNOSIS — R42 VERTIGO: Primary | ICD-10-CM

## 2025-02-12 DIAGNOSIS — D69.6 THROMBOCYTOPENIA, UNSPECIFIED: ICD-10-CM

## 2025-02-12 PROBLEM — C67.9 MALIGNANT NEOPLASM OF URINARY BLADDER (HCC): Status: RESOLVED | Noted: 2018-09-13 | Resolved: 2025-02-12

## 2025-02-12 PROCEDURE — 99213 OFFICE O/P EST LOW 20 MIN: CPT | Performed by: INTERNAL MEDICINE

## 2025-02-12 PROCEDURE — 1123F ACP DISCUSS/DSCN MKR DOCD: CPT | Performed by: INTERNAL MEDICINE

## 2025-02-12 RX ORDER — ALBUTEROL SULFATE 90 UG/1
1 INHALANT RESPIRATORY (INHALATION) AS NEEDED
COMMUNITY
Start: 2025-02-04

## 2025-02-12 RX ORDER — MECLIZINE HYDROCHLORIDE 25 MG/1
25 TABLET ORAL 3 TIMES DAILY PRN
Qty: 30 TABLET | Refills: 0 | Status: SHIPPED | OUTPATIENT
Start: 2025-02-12 | End: 2025-02-22

## 2025-02-12 SDOH — ECONOMIC STABILITY: FOOD INSECURITY: WITHIN THE PAST 12 MONTHS, THE FOOD YOU BOUGHT JUST DIDN'T LAST AND YOU DIDN'T HAVE MONEY TO GET MORE.: NEVER TRUE

## 2025-02-12 SDOH — ECONOMIC STABILITY: FOOD INSECURITY: WITHIN THE PAST 12 MONTHS, YOU WORRIED THAT YOUR FOOD WOULD RUN OUT BEFORE YOU GOT MONEY TO BUY MORE.: NEVER TRUE

## 2025-02-12 ASSESSMENT — PATIENT HEALTH QUESTIONNAIRE - PHQ9
SUM OF ALL RESPONSES TO PHQ QUESTIONS 1-9: 0
1. LITTLE INTEREST OR PLEASURE IN DOING THINGS: NOT AT ALL
2. FEELING DOWN, DEPRESSED OR HOPELESS: NOT AT ALL
SUM OF ALL RESPONSES TO PHQ QUESTIONS 1-9: 0
SUM OF ALL RESPONSES TO PHQ QUESTIONS 1-9: 0
SUM OF ALL RESPONSES TO PHQ9 QUESTIONS 1 & 2: 0
SUM OF ALL RESPONSES TO PHQ QUESTIONS 1-9: 0

## 2025-02-12 NOTE — PROGRESS NOTES
Patient identified with two identification factors, Name and Date of Birth.    Chief Complaint   Patient presents with    Dizziness     Started several months ago last week had them 2 days in a row while at work. No changes in blood pressure or blood sugar that he is aware of.       There were no vitals taken for this visit.      1. \"Have you been to the ER, urgent care clinic since your last visit?  Hospitalized since your last visit?\" No     2. \"Have you seen or consulted any other health care providers outside of the [facility] since your last visit?\" No     
  [x] Mouth/Throat: Mucous membranes are moist    External Ears [x] Normal  [] Abnormal -    Neck: [x] No visualized mass [] Abnormal -     Pulmonary/Chest: [x] Respiratory effort normal   [x] No visualized signs of difficulty breathing or respiratory distress        [] Abnormal -      Musculoskeletal:   [x] Normal gait with no signs of ataxia         [x] Normal range of motion of neck        [] Abnormal -     Neurological:        [x] No Facial Asymmetry (Cranial nerve 7 motor function) (limited exam due to video visit)          [x] No gaze palsy        [] Abnormal -          Skin:        [x] No significant exanthematous lesions or discoloration noted on facial skin         [] Abnormal -            Psychiatric:       [x] Normal Affect [] Abnormal -       Other pertinent observable physical exam findings:-        We discussed the expected course, resolution and complications of the diagnosis(es) in detail.  Medication risks, benefits, costs, interactions, and alternatives were discussed as indicated.  I advised him to contact the office if his condition worsens, changes or fails to improve as anticipated. He expressed understanding with the diagnosis(es) and plan.       Victor Manuel Stallings, was evaluated through a synchronous (real-time) audio-video encounter. The patient (or guardian if applicable) is aware that this is a billable service, which includes applicable co-pays. This Virtual Visit was conducted with patient's (and/or legal guardian's) consent. Patient identification was verified, and a caregiver was present when appropriate.   The patient was located at Home: [address] [place] [id number]  Provider was located at Home (Appt Dept State): [place]  Confirm you are appropriately licensed, registered, or certified to deliver care in the state where the patient is located as indicated above. If you are not or unsure, please re-schedule the visit: Yes, I confirm.        Total time spent for this

## 2025-02-18 NOTE — TELEPHONE ENCOUNTER
PT called in requesting update states currently at lab now and lab does not have orders.     This PSR offered to fax lab orders pt declined states lab will not received in time. Pt states leaving lab.

## 2025-02-19 DIAGNOSIS — D69.6 THROMBOCYTOPENIA, UNSPECIFIED: ICD-10-CM

## 2025-02-19 DIAGNOSIS — E11.9 TYPE 2 DIABETES MELLITUS WITHOUT COMPLICATION, UNSPECIFIED WHETHER LONG TERM INSULIN USE (HCC): ICD-10-CM

## 2025-02-19 DIAGNOSIS — E78.2 MIXED HYPERLIPIDEMIA: ICD-10-CM

## 2025-02-19 DIAGNOSIS — I10 ESSENTIAL (PRIMARY) HYPERTENSION: ICD-10-CM

## 2025-02-20 LAB
ALBUMIN SERPL-MCNC: 4.3 G/DL (ref 3.5–5)
ALBUMIN/GLOB SERPL: 1.3 (ref 1.1–2.2)
ALP SERPL-CCNC: 72 U/L (ref 45–117)
ALT SERPL-CCNC: 54 U/L (ref 12–78)
ANION GAP SERPL CALC-SCNC: 6 MMOL/L (ref 2–12)
AST SERPL-CCNC: 48 U/L (ref 15–37)
BASOPHILS # BLD: 0.05 K/UL (ref 0–0.1)
BASOPHILS NFR BLD: 0.7 % (ref 0–1)
BILIRUB SERPL-MCNC: 0.7 MG/DL (ref 0.2–1)
BUN SERPL-MCNC: 17 MG/DL (ref 6–20)
BUN/CREAT SERPL: 19 (ref 12–20)
CALCIUM SERPL-MCNC: 9.1 MG/DL (ref 8.5–10.1)
CHLORIDE SERPL-SCNC: 106 MMOL/L (ref 97–108)
CHOLEST SERPL-MCNC: 132 MG/DL
CO2 SERPL-SCNC: 25 MMOL/L (ref 21–32)
CREAT SERPL-MCNC: 0.88 MG/DL (ref 0.7–1.3)
DIFFERENTIAL METHOD BLD: ABNORMAL
EOSINOPHIL # BLD: 0.23 K/UL (ref 0–0.4)
EOSINOPHIL NFR BLD: 3.4 % (ref 0–7)
ERYTHROCYTE [DISTWIDTH] IN BLOOD BY AUTOMATED COUNT: 12.5 % (ref 11.5–14.5)
EST. AVERAGE GLUCOSE BLD GHB EST-MCNC: 151 MG/DL
GLOBULIN SER CALC-MCNC: 3.3 G/DL (ref 2–4)
GLUCOSE SERPL-MCNC: 141 MG/DL (ref 65–100)
HBA1C MFR BLD: 6.9 % (ref 4–5.6)
HCT VFR BLD AUTO: 48 % (ref 36.6–50.3)
HDLC SERPL-MCNC: 46 MG/DL
HDLC SERPL: 2.9 (ref 0–5)
HGB BLD-MCNC: 16 G/DL (ref 12.1–17)
IMM GRANULOCYTES # BLD AUTO: 0.02 K/UL (ref 0–0.04)
IMM GRANULOCYTES NFR BLD AUTO: 0.3 % (ref 0–0.5)
LDLC SERPL CALC-MCNC: 39.8 MG/DL (ref 0–100)
LYMPHOCYTES # BLD: 2.29 K/UL (ref 0.8–3.5)
LYMPHOCYTES NFR BLD: 33.5 % (ref 12–49)
MCH RBC QN AUTO: 31.4 PG (ref 26–34)
MCHC RBC AUTO-ENTMCNC: 33.3 G/DL (ref 30–36.5)
MCV RBC AUTO: 94.3 FL (ref 80–99)
MONOCYTES # BLD: 0.64 K/UL (ref 0–1)
MONOCYTES NFR BLD: 9.4 % (ref 5–13)
NEUTS SEG # BLD: 3.61 K/UL (ref 1.8–8)
NEUTS SEG NFR BLD: 52.7 % (ref 32–75)
NRBC # BLD: 0 K/UL (ref 0–0.01)
NRBC BLD-RTO: 0 PER 100 WBC
PLATELET # BLD AUTO: 143 K/UL (ref 150–400)
PMV BLD AUTO: 11.5 FL (ref 8.9–12.9)
POTASSIUM SERPL-SCNC: 4.6 MMOL/L (ref 3.5–5.1)
PROT SERPL-MCNC: 7.6 G/DL (ref 6.4–8.2)
RBC # BLD AUTO: 5.09 M/UL (ref 4.1–5.7)
SODIUM SERPL-SCNC: 137 MMOL/L (ref 136–145)
TRIGL SERPL-MCNC: 231 MG/DL
VLDLC SERPL CALC-MCNC: 46.2 MG/DL
WBC # BLD AUTO: 6.8 K/UL (ref 4.1–11.1)

## 2025-02-25 ENCOUNTER — OFFICE VISIT (OUTPATIENT)
Age: 75
End: 2025-02-25
Payer: COMMERCIAL

## 2025-02-25 VITALS
HEIGHT: 69 IN | WEIGHT: 187.6 LBS | HEART RATE: 59 BPM | OXYGEN SATURATION: 97 % | BODY MASS INDEX: 27.78 KG/M2 | RESPIRATION RATE: 18 BRPM | SYSTOLIC BLOOD PRESSURE: 121 MMHG | DIASTOLIC BLOOD PRESSURE: 55 MMHG | TEMPERATURE: 98.3 F

## 2025-02-25 DIAGNOSIS — E11.9 TYPE 2 DIABETES MELLITUS WITHOUT COMPLICATION, UNSPECIFIED WHETHER LONG TERM INSULIN USE (HCC): Primary | ICD-10-CM

## 2025-02-25 DIAGNOSIS — D69.6 THROMBOCYTOPENIA, UNSPECIFIED: ICD-10-CM

## 2025-02-25 DIAGNOSIS — I10 ESSENTIAL (PRIMARY) HYPERTENSION: ICD-10-CM

## 2025-02-25 DIAGNOSIS — R05.3 CHRONIC COUGH: ICD-10-CM

## 2025-02-25 DIAGNOSIS — E78.2 MIXED HYPERLIPIDEMIA: ICD-10-CM

## 2025-02-25 PROCEDURE — 3044F HG A1C LEVEL LT 7.0%: CPT | Performed by: INTERNAL MEDICINE

## 2025-02-25 PROCEDURE — 99214 OFFICE O/P EST MOD 30 MIN: CPT | Performed by: INTERNAL MEDICINE

## 2025-02-25 PROCEDURE — 3078F DIAST BP <80 MM HG: CPT | Performed by: INTERNAL MEDICINE

## 2025-02-25 PROCEDURE — 3074F SYST BP LT 130 MM HG: CPT | Performed by: INTERNAL MEDICINE

## 2025-02-25 PROCEDURE — 1123F ACP DISCUSS/DSCN MKR DOCD: CPT | Performed by: INTERNAL MEDICINE

## 2025-02-25 NOTE — PROGRESS NOTES
\"Have you been to the ER, urgent care clinic since your last visit?  Hospitalized since your last visit?\"    NO    “Have you seen or consulted any other health care providers outside our system since your last visit?”    NO    “Have you had a colorectal cancer screening such as a colonoscopy/FIT/Cologuard?    Colorguard/ December 2024 / Negative    Date of last Colonoscopy: 3/14/2017  Date of last Cologuard: 1/7/2025  No FIT/FOBT on file   No flexible sigmoidoscopy on file     “Have you had a diabetic eye exam?”    June 2024    Date of last diabetic eye exam: 8/16/2016

## 2025-02-25 NOTE — PROGRESS NOTES
SUBJECTIVE:   Mr. Victor Manuel Stallings is a 74 y.o. W male who is here for follow up of routine medical issues.  Also CPE.     Chief Complaint   Patient presents with    Follow-up    Diabetes       Exertional dyspnea and cough have been chronic. He saw a pulmonologist, Dr. Nunez. \"He doesn't think it's COPD, but rather asthma.\" He is to get repeat PFT. \"The inhaler he gave me really helped knock down the cough.\"     Dr. Chavez is planning stress test, and an implanted heart monitor, both in March.     The vertigo went away.      He still has some pain in R knee, worse with going up and down steps.     Right shoulder has a decreased range of motion, but is much better. Pain is better. He is getting PT.     He has been seeing Dr. Tena, rheumatologist, for arthritis. On Celebrex--now doubled. He has had issues with pain in R knee, elbow, thumb.      Back pain: Dr. Rodriguez retired; more recently he has seen Dr. Camacho. On Pregabalin.  We noted 2020:   It got so bad he went to see Dr. Rodriguez, and was sent to PT. Last week he did dry needling and \"it made a big difference.\"  Pain still severe--\"I could hardly get up this morning.\"   He has gotten TAMMY. He was put on low dose gabapentin, which he has stopped along with muscle relaxer because \"I felt like Gumby.\" He takes about 2 oxycodone per day. Currently tolerating pregabalin.     He has chronic low back pain: Pain generally is radiating especially down R leg.      It is recalled that he underwent C-spine fusion in 2012, with Dr. Rodriguez.  Still, he gets tingling and throbbing at times, particularly after long days, or activity.      Dr. Stewart has treated him for bladder cancer, and administered BCG. Now seeing him yearly. He has put him on Gemtesa.     Stress: Working part time now--\"it's been really good for me.\"     Memory: Tested by Dr. Goldberg in 2014, and no dementia.  We noted before: He would like to have his memory checked.  He has been a bit forgetful

## 2025-03-07 ENCOUNTER — TELEPHONE (OUTPATIENT)
Age: 75
End: 2025-03-07

## 2025-03-07 NOTE — TELEPHONE ENCOUNTER
Telephone call made to patient. Two patient identifiers verified.   Went over results with patient. Verified understanding. All questions answered.   Pt has no complaints of sob, cp, dizziness, palps.    Future Appointments   Date Time Provider Department Center   6/23/2025 10:00 AM Sherry Santizo PSYD [facility] BS AMB   7/10/2025 11:30 AM NEUROPSYCH TESTING [facility] BS AMB   7/25/2025  1:30 PM Sherry Santizo PSYD [facility] BS AMB   2/3/2026  8:20 AM Mendel Chavez MD [facility] AMB

## 2025-03-07 NOTE — TELEPHONE ENCOUNTER
----- Message from Dr. Mendel Chavez MD sent at 3/7/2025  1:09 PM EST -----  No evidence of blockages in the heart arteries although stress test can occasionally make a mistake.  If doing well, follow-up as scheduled.

## 2025-06-03 RX ORDER — ROSUVASTATIN CALCIUM 5 MG/1
5 TABLET, COATED ORAL DAILY
Qty: 90 TABLET | Refills: 1 | Status: SHIPPED | OUTPATIENT
Start: 2025-06-03

## 2025-06-20 NOTE — PROGRESS NOTES
INTAKE NOTE     Name: Victor Manuel Stallings MRN: [id number]   Age: 74 y.o.  YOB: 1950   Gender: male Date of Intake: 6/23/2025   Race/Ethnicity: White (non-)     Education: High school     Handedness: Right Referral Source: Ken Nuñez MD (PCP)     EVALUATION METHODS: The following information was gathered from a clinical interview with Mr. Stallings and a review of available medical records. This evaluation was conducted for clinical treatment planning and may not be valid for other purposes. Potential risks and benefits, limits of confidentiality, and evaluation procedures were discussed. Mr. Stallings expressed an understanding of the purpose of the visit and consented to the procedures.     REASON FOR REFERRAL: Mr. Stallings was referred by his PCP to evaluate cognitive functioning and rule out impairment in the context of memory concerns. He was seen by his PCP on 12/4/2024 with concerns of short-term memory declines, citing \"I get confused quickly. If something unusual happens, my normal train of thought leaves me.\" Goal of evaluation is to establish a cognitive baseline and rule out neurodegenerative factors. Information gathered today will assist in differential diagnosis and treatment planning/recommendations.     PRESENTING CONCERNS     Cognitive Functioning: Onset of cognitive difficulties reportedly began after COVID in 2020. He had taken 14 months off from work and upon return, he noticed that he was struggling to perform several job functions, especially those related to using the computer. His wife also mentioned that she was noticing a change at home too. He described a period of time where he felt that he got better the more he got accustomed back to work, but is still experiencing fluctuating moments of cognitive difficulty that raised his concern and warranted this evaluation. Mr. Stallings reported that his short-term memory is not good. He is also noticing confusion

## 2025-06-23 ENCOUNTER — OFFICE VISIT (OUTPATIENT)
Age: 75
End: 2025-06-23
Payer: COMMERCIAL

## 2025-06-23 DIAGNOSIS — R41.3 SHORT-TERM MEMORY LOSS: Primary | ICD-10-CM

## 2025-06-23 DIAGNOSIS — Z72.820 SLEEP DEFICIENT: ICD-10-CM

## 2025-06-23 PROCEDURE — 90791 PSYCH DIAGNOSTIC EVALUATION: CPT | Performed by: STUDENT IN AN ORGANIZED HEALTH CARE EDUCATION/TRAINING PROGRAM

## 2025-06-23 PROCEDURE — 1123F ACP DISCUSS/DSCN MKR DOCD: CPT | Performed by: STUDENT IN AN ORGANIZED HEALTH CARE EDUCATION/TRAINING PROGRAM

## 2025-06-23 NOTE — PATIENT INSTRUCTIONS
Thank you for choosing us for your neuropsychological evaluation. We will examine your overall cognitive functioning, including areas such as memory, attention, concentration, language, and problem solving.   You were seen for an initial visit by Dr. Sherry Santizo (Neuropsychologist) today. Please refer to the dates listed under \"What's Next\" for your scheduled Procedure (testing) and Follow-Up appointments.    Reminders for your testing appointment:   If you wear glasses/contacts and/or hearing assistive devices, please be sure to bring them with you to your testing appointment.   You will be working with my Psychometrist, Marlee, on the day of testing. She is specially trained to administer these cognitive tests to you.   Please prepare accordingly, as the duration of testing may take a few hours to complete.     To reschedule or cancel your appointment, please call [phone].   In case of an emergency, call [phone] or report to the nearest emergency department.  It has been our pleasure to work with you, and we look forward to speaking with you soon.

## 2025-06-24 ENCOUNTER — TELEPHONE (OUTPATIENT)
Age: 75
End: 2025-06-24

## 2025-07-09 ENCOUNTER — PROCEDURE VISIT (OUTPATIENT)
Age: 75
End: 2025-07-09
Payer: COMMERCIAL

## 2025-07-09 DIAGNOSIS — F41.9 ANXIETY: ICD-10-CM

## 2025-07-09 DIAGNOSIS — R41.9 COGNITIVE COMPLAINTS WITH NORMAL NEUROPSYCHOLOGICAL EXAM: Primary | ICD-10-CM

## 2025-07-09 PROCEDURE — 96133 NRPSYC TST EVAL PHYS/QHP EA: CPT | Performed by: STUDENT IN AN ORGANIZED HEALTH CARE EDUCATION/TRAINING PROGRAM

## 2025-07-09 PROCEDURE — 96138 PSYCL/NRPSYC TECH 1ST: CPT | Performed by: STUDENT IN AN ORGANIZED HEALTH CARE EDUCATION/TRAINING PROGRAM

## 2025-07-09 PROCEDURE — 96139 PSYCL/NRPSYC TST TECH EA: CPT | Performed by: STUDENT IN AN ORGANIZED HEALTH CARE EDUCATION/TRAINING PROGRAM

## 2025-07-09 PROCEDURE — 96132 NRPSYC TST EVAL PHYS/QHP 1ST: CPT | Performed by: STUDENT IN AN ORGANIZED HEALTH CARE EDUCATION/TRAINING PROGRAM

## 2025-07-28 ENCOUNTER — PATIENT MESSAGE (OUTPATIENT)
Age: 75
End: 2025-07-28

## 2025-07-29 ENCOUNTER — TELEMEDICINE (OUTPATIENT)
Age: 75
End: 2025-07-29
Payer: COMMERCIAL

## 2025-07-29 DIAGNOSIS — U07.1 COVID-19: Primary | ICD-10-CM

## 2025-07-29 PROCEDURE — 99213 OFFICE O/P EST LOW 20 MIN: CPT | Performed by: INTERNAL MEDICINE

## 2025-07-29 PROCEDURE — 1123F ACP DISCUSS/DSCN MKR DOCD: CPT | Performed by: INTERNAL MEDICINE

## 2025-07-29 RX ORDER — UMECLIDINIUM BROMIDE AND VILANTEROL TRIFENATATE 62.5; 25 UG/1; UG/1
1 POWDER RESPIRATORY (INHALATION) DAILY
COMMUNITY
Start: 2025-07-13

## 2025-07-29 NOTE — PROGRESS NOTES
Victor Manuel Stallings is a 74 y.o. male who was seen by synchronous (real-time) audio-video technology on 2025 for Covid (Tested positive for Covid on , s/s started on Saturday.)        Progress Note         PROGRESS NOTE  Name: Victor Manuel Stallings   : 1950       ASSESSMENT/ PLAN:     Victor Manuel \"Mohamud\" was seen today for covid.    Diagnoses and all orders for this visit:    COVID-19  -     nirmatrelvir/ritonavir 300/100 (PAXLOVID, 300/100,) 20 x 150 MG & 10 x 100MG TBPK; Take 3 tablets (two 150 mg nirmatrelvir and one 100 mg ritonavir tablets) by mouth every 12 hours for 5 days.    Get plenty of rest, drink plenty of fluids.   Take over the counter vitamin C and zinc supplements.   Tylenol may be taken as needed for pain and fever.   Go to the emergency department if develops dyspnea, or fever goes higher than 101.5 degrees.     Return if symptoms worsen or fail to improve.     I have reviewed the patient's medications and risks/side effects/benefits were discussed. Diagnosis(-es) explained to patient and questions answered. Literature provided where appropriate.                       SUBJECTIVE  Mr. Victor Manuel Stallings presents today acutely for     Chief Complaint   Patient presents with    Covid     Tested positive for Covid on , s/s started on Saturday.     The patient presents today with a complaint of upper respiratory illness.  He was exposed to sick coworkers on Thursday, and began to have symptoms 2 days ago.  At first these were mild, but then became more severe today.  He has a pounding headache, nasal congestion, fever, fatigue and malaise.  He tested positive for COVID yesterday.    OBJECTIVE  There were no vitals taken for this visit.  Gen: Pleasant 74 y.o.  male in NAD.             Objective:          No data to display                 [INSTRUCTIONS:  \"[x]\" Indicates a positive item  \"[]\" Indicates a negative item  -- DELETE ALL ITEMS NOT EXAMINED]    Constitutional: [x]

## 2025-07-29 NOTE — PROGRESS NOTES
Have you been to the ER, urgent care clinic since your last visit?  Hospitalized since your last visit?   NO    Have you seen or consulted any other health care providers outside our system since your last visit?   YES - When: approximately 8  weeks ago.  Where and Why: , [facility].  manages his CPAP    “Have you had a colorectal cancer screening such as a colonoscopy/FIT/Cologuard?    YES - Type: Cologuard     Date of last Colonoscopy: 3/14/2017  Date of last Cologuard: 1/7/2025  No FIT/FOBT on file   No flexible sigmoidoscopy on file     “Have you had a diabetic eye exam?”    NO     Date of last diabetic eye exam: 8/16/2016

## 2025-08-06 ENCOUNTER — OFFICE VISIT (OUTPATIENT)
Age: 75
End: 2025-08-06
Payer: COMMERCIAL

## 2025-08-06 DIAGNOSIS — R41.9 COGNITIVE COMPLAINTS WITH NORMAL NEUROPSYCHOLOGICAL EXAM: Primary | ICD-10-CM

## 2025-08-06 PROCEDURE — 96132 NRPSYC TST EVAL PHYS/QHP 1ST: CPT | Performed by: STUDENT IN AN ORGANIZED HEALTH CARE EDUCATION/TRAINING PROGRAM

## 2025-08-13 ENCOUNTER — HOSPITAL ENCOUNTER (OUTPATIENT)
Facility: HOSPITAL | Age: 75
Discharge: HOME | End: 2025-08-16
Payer: COMMERCIAL

## 2025-08-13 ENCOUNTER — TRANSCRIBE ORDERS (OUTPATIENT)
Facility: HOSPITAL | Age: 75
End: 2025-08-13

## 2025-08-13 DIAGNOSIS — R06.02 SHORTNESS OF BREATH: ICD-10-CM

## 2025-08-13 DIAGNOSIS — R06.02 SHORTNESS OF BREATH: Primary | ICD-10-CM

## 2025-08-13 PROCEDURE — 71046 X-RAY EXAM CHEST 2 VIEWS: CPT

## 2025-09-03 ENCOUNTER — OFFICE VISIT (OUTPATIENT)
Age: 75
End: 2025-09-03

## 2025-09-03 VITALS
RESPIRATION RATE: 16 BRPM | SYSTOLIC BLOOD PRESSURE: 107 MMHG | BODY MASS INDEX: 27.7 KG/M2 | HEART RATE: 66 BPM | HEIGHT: 69 IN | TEMPERATURE: 98.2 F | WEIGHT: 187 LBS | DIASTOLIC BLOOD PRESSURE: 65 MMHG | OXYGEN SATURATION: 96 %

## 2025-09-03 DIAGNOSIS — S60.459A ACUTE FOREIGN BODY OF FINGERNAIL, INITIAL ENCOUNTER: Primary | ICD-10-CM

## 2025-09-03 ASSESSMENT — ENCOUNTER SYMPTOMS: COLOR CHANGE: 0

## 2025-09-05 DIAGNOSIS — M48.02 SPINAL STENOSIS, CERVICAL REGION: ICD-10-CM

## 2025-09-05 RX ORDER — ORAL SEMAGLUTIDE 3 MG/1
TABLET ORAL
Qty: 90 TABLET | Refills: 3 | Status: SHIPPED | OUTPATIENT
Start: 2025-09-05